# Patient Record
Sex: MALE | Race: WHITE | Employment: OTHER | URBAN - METROPOLITAN AREA
[De-identification: names, ages, dates, MRNs, and addresses within clinical notes are randomized per-mention and may not be internally consistent; named-entity substitution may affect disease eponyms.]

---

## 2017-01-03 ENCOUNTER — HOSPITAL ENCOUNTER (OUTPATIENT)
Dept: PHYSICAL THERAPY | Age: 82
Setting detail: THERAPIES SERIES
Discharge: HOME | End: 2017-01-03
Payer: MEDICARE

## 2017-01-03 PROCEDURE — 97110 THERAPEUTIC EXERCISES: CPT

## 2017-01-03 ASSESSMENT — PAIN DESCRIPTION - ORIENTATION: ORIENTATION: RIGHT

## 2017-01-03 ASSESSMENT — PAIN SCALES - GENERAL: PAINLEVEL_OUTOF10: 4

## 2017-01-03 ASSESSMENT — PAIN DESCRIPTION - DESCRIPTORS: DESCRIPTORS: ACHING

## 2017-01-03 ASSESSMENT — PAIN DESCRIPTION - LOCATION: LOCATION: KNEE;BACK

## 2017-01-06 ENCOUNTER — HOSPITAL ENCOUNTER (OUTPATIENT)
Dept: PHYSICAL THERAPY | Age: 82
Setting detail: THERAPIES SERIES
Discharge: HOME | End: 2017-01-06
Payer: MEDICARE

## 2017-01-06 PROCEDURE — 97110 THERAPEUTIC EXERCISES: CPT

## 2017-01-06 ASSESSMENT — PAIN DESCRIPTION - LOCATION: LOCATION: KNEE;BACK

## 2017-01-06 ASSESSMENT — PAIN SCALES - GENERAL: PAINLEVEL_OUTOF10: 5

## 2017-01-06 ASSESSMENT — PAIN DESCRIPTION - ORIENTATION: ORIENTATION: LOWER;RIGHT

## 2017-01-09 ENCOUNTER — HOSPITAL ENCOUNTER (OUTPATIENT)
Dept: PHYSICAL THERAPY | Age: 82
Setting detail: THERAPIES SERIES
Discharge: HOME | End: 2017-01-09
Payer: MEDICARE

## 2017-01-12 ENCOUNTER — HOSPITAL ENCOUNTER (OUTPATIENT)
Dept: PHYSICAL THERAPY | Age: 82
Setting detail: THERAPIES SERIES
Discharge: HOME | End: 2017-01-12
Payer: MEDICARE

## 2017-01-12 PROCEDURE — 97116 GAIT TRAINING THERAPY: CPT

## 2017-01-12 PROCEDURE — 97110 THERAPEUTIC EXERCISES: CPT

## 2017-01-16 ENCOUNTER — HOSPITAL ENCOUNTER (OUTPATIENT)
Dept: PHYSICAL THERAPY | Age: 82
Setting detail: THERAPIES SERIES
Discharge: HOME | End: 2017-01-16
Payer: MEDICARE

## 2017-01-16 PROCEDURE — 97110 THERAPEUTIC EXERCISES: CPT

## 2017-01-16 PROCEDURE — G8979 MOBILITY GOAL STATUS: HCPCS

## 2017-01-16 PROCEDURE — G8978 MOBILITY CURRENT STATUS: HCPCS

## 2017-01-16 ASSESSMENT — PAIN DESCRIPTION - ORIENTATION: ORIENTATION: RIGHT

## 2017-01-16 ASSESSMENT — PAIN DESCRIPTION - DESCRIPTORS: DESCRIPTORS: ACHING

## 2017-01-16 ASSESSMENT — PAIN DESCRIPTION - LOCATION: LOCATION: KNEE

## 2017-01-16 ASSESSMENT — PAIN SCALES - GENERAL: PAINLEVEL_OUTOF10: 5

## 2017-01-19 ENCOUNTER — HOSPITAL ENCOUNTER (OUTPATIENT)
Dept: PHYSICAL THERAPY | Age: 82
Setting detail: THERAPIES SERIES
Discharge: HOME | End: 2017-01-19
Payer: MEDICARE

## 2017-01-19 PROCEDURE — 97110 THERAPEUTIC EXERCISES: CPT

## 2017-01-23 ENCOUNTER — HOSPITAL ENCOUNTER (OUTPATIENT)
Dept: PHYSICAL THERAPY | Age: 82
Setting detail: THERAPIES SERIES
Discharge: HOME | End: 2017-01-23
Payer: MEDICARE

## 2017-01-23 PROCEDURE — 97110 THERAPEUTIC EXERCISES: CPT

## 2017-01-23 ASSESSMENT — PAIN DESCRIPTION - ORIENTATION: ORIENTATION: RIGHT

## 2017-01-23 ASSESSMENT — PAIN DESCRIPTION - LOCATION: LOCATION: KNEE

## 2017-01-25 ENCOUNTER — HOSPITAL ENCOUNTER (OUTPATIENT)
Dept: PHARMACY | Age: 82
Discharge: HOME | End: 2017-01-25
Payer: MEDICARE

## 2017-01-25 DIAGNOSIS — I48.91 ATRIAL FIBRILLATION, UNSPECIFIED TYPE (HCC): ICD-10-CM

## 2017-01-25 LAB
INR BLD: 3.1
PROTIME: 37.5 SECONDS

## 2017-01-25 PROCEDURE — G0463 HOSPITAL OUTPT CLINIC VISIT: HCPCS

## 2017-01-25 PROCEDURE — 85610 PROTHROMBIN TIME: CPT

## 2017-01-26 ENCOUNTER — HOSPITAL ENCOUNTER (OUTPATIENT)
Dept: PHYSICAL THERAPY | Age: 82
Setting detail: THERAPIES SERIES
Discharge: HOME | End: 2017-01-26
Payer: MEDICARE

## 2017-01-26 PROCEDURE — 97110 THERAPEUTIC EXERCISES: CPT

## 2017-01-30 ENCOUNTER — HOSPITAL ENCOUNTER (OUTPATIENT)
Dept: PHYSICAL THERAPY | Age: 82
Setting detail: THERAPIES SERIES
Discharge: HOME | End: 2017-01-30
Payer: MEDICARE

## 2017-01-30 PROCEDURE — 97110 THERAPEUTIC EXERCISES: CPT

## 2017-01-30 ASSESSMENT — PAIN DESCRIPTION - LOCATION: LOCATION: KNEE

## 2017-01-30 ASSESSMENT — PAIN DESCRIPTION - ORIENTATION: ORIENTATION: RIGHT

## 2017-02-03 ENCOUNTER — HOSPITAL ENCOUNTER (OUTPATIENT)
Dept: PHYSICAL THERAPY | Age: 82
Setting detail: THERAPIES SERIES
Discharge: HOME | End: 2017-02-03
Payer: MEDICARE

## 2017-02-03 PROCEDURE — 97110 THERAPEUTIC EXERCISES: CPT

## 2017-02-03 ASSESSMENT — PAIN DESCRIPTION - DESCRIPTORS: DESCRIPTORS: TIGHTNESS

## 2017-02-03 ASSESSMENT — PAIN DESCRIPTION - LOCATION: LOCATION: KNEE

## 2017-02-03 ASSESSMENT — PAIN SCALES - GENERAL: PAINLEVEL_OUTOF10: 4

## 2017-02-03 ASSESSMENT — PAIN DESCRIPTION - ORIENTATION: ORIENTATION: LEFT

## 2017-02-06 ENCOUNTER — HOSPITAL ENCOUNTER (OUTPATIENT)
Dept: PHYSICAL THERAPY | Age: 82
Setting detail: THERAPIES SERIES
Discharge: HOME | End: 2017-02-06
Payer: MEDICARE

## 2017-02-06 PROCEDURE — 97112 NEUROMUSCULAR REEDUCATION: CPT

## 2017-02-06 PROCEDURE — 97110 THERAPEUTIC EXERCISES: CPT

## 2017-02-06 ASSESSMENT — PAIN DESCRIPTION - LOCATION: LOCATION: KNEE

## 2017-02-06 ASSESSMENT — PAIN SCALES - GENERAL: PAINLEVEL_OUTOF10: 5

## 2017-02-06 ASSESSMENT — PAIN DESCRIPTION - FREQUENCY: FREQUENCY: INTERMITTENT

## 2017-02-06 ASSESSMENT — PAIN DESCRIPTION - ORIENTATION: ORIENTATION: LEFT

## 2017-02-09 ENCOUNTER — HOSPITAL ENCOUNTER (OUTPATIENT)
Dept: PHYSICAL THERAPY | Age: 82
Setting detail: THERAPIES SERIES
Discharge: HOME | End: 2017-02-09
Payer: MEDICARE

## 2017-02-10 ENCOUNTER — APPOINTMENT (OUTPATIENT)
Dept: PHYSICAL THERAPY | Age: 82
End: 2017-02-10
Payer: MEDICARE

## 2017-02-13 ENCOUNTER — HOSPITAL ENCOUNTER (OUTPATIENT)
Dept: PHYSICAL THERAPY | Age: 82
Setting detail: THERAPIES SERIES
Discharge: HOME | End: 2017-02-13
Payer: MEDICARE

## 2017-02-13 PROCEDURE — 97110 THERAPEUTIC EXERCISES: CPT

## 2017-02-15 ENCOUNTER — HOSPITAL ENCOUNTER (OUTPATIENT)
Dept: PHARMACY | Age: 82
Discharge: HOME | End: 2017-02-15
Payer: MEDICARE

## 2017-02-15 DIAGNOSIS — I48.91 ATRIAL FIBRILLATION, UNSPECIFIED TYPE (HCC): ICD-10-CM

## 2017-02-15 LAB
INR BLD: 2.8
PROTIME: 33.5 SECONDS

## 2017-02-15 PROCEDURE — G0463 HOSPITAL OUTPT CLINIC VISIT: HCPCS

## 2017-02-15 PROCEDURE — 85610 PROTHROMBIN TIME: CPT

## 2017-02-16 ENCOUNTER — HOSPITAL ENCOUNTER (OUTPATIENT)
Dept: PHYSICAL THERAPY | Age: 82
Setting detail: THERAPIES SERIES
Discharge: HOME | End: 2017-02-16
Payer: MEDICARE

## 2017-02-16 PROCEDURE — 97110 THERAPEUTIC EXERCISES: CPT

## 2017-02-16 ASSESSMENT — PAIN DESCRIPTION - LOCATION: LOCATION: KNEE

## 2017-02-16 ASSESSMENT — PAIN SCALES - GENERAL: PAINLEVEL_OUTOF10: 5

## 2017-02-16 ASSESSMENT — PAIN DESCRIPTION - DESCRIPTORS: DESCRIPTORS: ACHING

## 2017-02-16 ASSESSMENT — PAIN DESCRIPTION - PAIN TYPE: TYPE: CHRONIC PAIN

## 2017-02-16 ASSESSMENT — PAIN DESCRIPTION - ORIENTATION: ORIENTATION: LEFT;RIGHT

## 2017-02-20 ENCOUNTER — HOSPITAL ENCOUNTER (OUTPATIENT)
Dept: PHYSICAL THERAPY | Age: 82
Setting detail: THERAPIES SERIES
Discharge: HOME | End: 2017-02-20
Payer: MEDICARE

## 2017-02-20 PROCEDURE — 97110 THERAPEUTIC EXERCISES: CPT

## 2017-02-23 ENCOUNTER — HOSPITAL ENCOUNTER (OUTPATIENT)
Dept: PHYSICAL THERAPY | Age: 82
Setting detail: THERAPIES SERIES
Discharge: HOME | End: 2017-02-23
Payer: MEDICARE

## 2017-03-01 ENCOUNTER — HOSPITAL ENCOUNTER (OUTPATIENT)
Dept: PHYSICAL THERAPY | Age: 82
Setting detail: THERAPIES SERIES
Discharge: HOME | End: 2017-03-01
Payer: MEDICARE

## 2017-03-01 PROCEDURE — 97110 THERAPEUTIC EXERCISES: CPT

## 2017-03-01 PROCEDURE — G8979 MOBILITY GOAL STATUS: HCPCS

## 2017-03-01 PROCEDURE — G8980 MOBILITY D/C STATUS: HCPCS

## 2017-03-15 ENCOUNTER — HOSPITAL ENCOUNTER (OUTPATIENT)
Dept: PHARMACY | Age: 82
Discharge: HOME | End: 2017-03-15
Payer: MEDICARE

## 2017-03-15 DIAGNOSIS — I48.91 ATRIAL FIBRILLATION, UNSPECIFIED TYPE (HCC): ICD-10-CM

## 2017-03-15 LAB
INR BLD: 2.3
PROTIME: 27.8 SECONDS

## 2017-03-15 PROCEDURE — G0463 HOSPITAL OUTPT CLINIC VISIT: HCPCS

## 2017-03-15 PROCEDURE — 85610 PROTHROMBIN TIME: CPT

## 2017-04-06 ENCOUNTER — HOSPITAL ENCOUNTER (OUTPATIENT)
Dept: ORTHOPEDIC SURGERY | Age: 82
Discharge: HOME | End: 2017-04-06
Payer: MEDICARE

## 2017-04-06 DIAGNOSIS — M17.12 LOCALIZED PRIMARY OSTEOARTHRITIS OF LOWER LEG, LEFT: ICD-10-CM

## 2017-04-06 PROCEDURE — 73560 X-RAY EXAM OF KNEE 1 OR 2: CPT

## 2017-04-12 ENCOUNTER — HOSPITAL ENCOUNTER (OUTPATIENT)
Dept: PHARMACY | Age: 82
Discharge: HOME | End: 2017-04-12
Payer: MEDICARE

## 2017-04-12 DIAGNOSIS — I48.91 ATRIAL FIBRILLATION, UNSPECIFIED TYPE (HCC): ICD-10-CM

## 2017-04-12 LAB
INR BLD: 2.1
PROTIME: 25.7 SECONDS

## 2017-04-12 PROCEDURE — 85610 PROTHROMBIN TIME: CPT

## 2017-04-12 PROCEDURE — G0463 HOSPITAL OUTPT CLINIC VISIT: HCPCS

## 2017-05-05 ENCOUNTER — OFFICE VISIT (OUTPATIENT)
Dept: FAMILY MEDICINE CLINIC | Age: 82
End: 2017-05-05

## 2017-05-05 VITALS
OXYGEN SATURATION: 96 % | HEART RATE: 67 BPM | DIASTOLIC BLOOD PRESSURE: 68 MMHG | HEIGHT: 66 IN | SYSTOLIC BLOOD PRESSURE: 118 MMHG | RESPIRATION RATE: 18 BRPM | TEMPERATURE: 97.8 F | WEIGHT: 180.7 LBS | BODY MASS INDEX: 29.04 KG/M2

## 2017-05-05 DIAGNOSIS — I48.91 ATRIAL FIBRILLATION, UNSPECIFIED TYPE (HCC): ICD-10-CM

## 2017-05-05 DIAGNOSIS — N40.1 BENIGN NON-NODULAR PROSTATIC HYPERPLASIA WITH LOWER URINARY TRACT SYMPTOMS: Primary | ICD-10-CM

## 2017-05-05 DIAGNOSIS — I48.21 PERMANENT ATRIAL FIBRILLATION (HCC): ICD-10-CM

## 2017-05-05 PROCEDURE — 4040F PNEUMOC VAC/ADMIN/RCVD: CPT | Performed by: FAMILY MEDICINE

## 2017-05-05 PROCEDURE — 99214 OFFICE O/P EST MOD 30 MIN: CPT | Performed by: FAMILY MEDICINE

## 2017-05-05 PROCEDURE — 1036F TOBACCO NON-USER: CPT | Performed by: FAMILY MEDICINE

## 2017-05-05 PROCEDURE — G8427 DOCREV CUR MEDS BY ELIG CLIN: HCPCS | Performed by: FAMILY MEDICINE

## 2017-05-05 PROCEDURE — G8420 CALC BMI NORM PARAMETERS: HCPCS | Performed by: FAMILY MEDICINE

## 2017-05-05 PROCEDURE — 1123F ACP DISCUSS/DSCN MKR DOCD: CPT | Performed by: FAMILY MEDICINE

## 2017-05-05 RX ORDER — TRIAMTERENE AND HYDROCHLOROTHIAZIDE 37.5; 25 MG/1; MG/1
1 TABLET ORAL DAILY
COMMUNITY
End: 2018-05-17 | Stop reason: ALTCHOICE

## 2017-05-05 ASSESSMENT — ENCOUNTER SYMPTOMS
GASTROINTESTINAL NEGATIVE: 1
EYES NEGATIVE: 1
SHORTNESS OF BREATH: 0
ALLERGIC/IMMUNOLOGIC NEGATIVE: 1
RESPIRATORY NEGATIVE: 1

## 2017-05-11 ENCOUNTER — HOSPITAL ENCOUNTER (OUTPATIENT)
Dept: PHARMACY | Age: 82
Discharge: HOME | End: 2017-05-11
Payer: MEDICARE

## 2017-05-11 DIAGNOSIS — I48.91 ATRIAL FIBRILLATION, UNSPECIFIED TYPE (HCC): ICD-10-CM

## 2017-05-11 LAB
INR BLD: 2.4
PROTIME: 28.7 SECONDS

## 2017-05-11 PROCEDURE — 85610 PROTHROMBIN TIME: CPT | Performed by: PHARMACIST

## 2017-05-11 PROCEDURE — 99211 OFF/OP EST MAY X REQ PHY/QHP: CPT | Performed by: PHARMACIST

## 2017-06-08 ENCOUNTER — HOSPITAL ENCOUNTER (OUTPATIENT)
Dept: PHARMACY | Age: 82
Discharge: HOME | End: 2017-06-08
Payer: MEDICARE

## 2017-06-08 DIAGNOSIS — I48.91 ATRIAL FIBRILLATION, UNSPECIFIED TYPE (HCC): ICD-10-CM

## 2017-06-08 LAB
INR BLD: 2.7
PROTIME: 32.1 SECONDS

## 2017-06-08 PROCEDURE — 99211 OFF/OP EST MAY X REQ PHY/QHP: CPT | Performed by: PHARMACIST

## 2017-06-08 PROCEDURE — 85610 PROTHROMBIN TIME: CPT | Performed by: PHARMACIST

## 2017-07-06 ENCOUNTER — HOSPITAL ENCOUNTER (OUTPATIENT)
Dept: PHARMACY | Age: 82
Setting detail: THERAPIES SERIES
Discharge: HOME | End: 2017-07-06
Payer: MEDICARE

## 2017-07-06 DIAGNOSIS — I48.91 ATRIAL FIBRILLATION, UNSPECIFIED TYPE (HCC): ICD-10-CM

## 2017-07-06 LAB
INR BLD: 2.8
PROTIME: 33.2 SECONDS

## 2017-07-06 PROCEDURE — 99211 OFF/OP EST MAY X REQ PHY/QHP: CPT | Performed by: PHARMACIST

## 2017-07-06 PROCEDURE — 85610 PROTHROMBIN TIME: CPT | Performed by: PHARMACIST

## 2017-07-25 RX ORDER — WARFARIN SODIUM 5 MG/1
TABLET ORAL
Qty: 140 TABLET | Refills: 1 | Status: SHIPPED | OUTPATIENT
Start: 2017-07-25 | End: 2017-07-26 | Stop reason: SDUPTHER

## 2017-07-26 RX ORDER — WARFARIN SODIUM 5 MG/1
TABLET ORAL
Qty: 140 TABLET | Refills: 1 | Status: SHIPPED | OUTPATIENT
Start: 2017-07-26 | End: 2018-01-18 | Stop reason: SDUPTHER

## 2017-08-10 ENCOUNTER — HOSPITAL ENCOUNTER (OUTPATIENT)
Dept: PHARMACY | Age: 82
Setting detail: THERAPIES SERIES
Discharge: HOME | End: 2017-08-10
Payer: MEDICARE

## 2017-08-10 DIAGNOSIS — I48.91 ATRIAL FIBRILLATION, UNSPECIFIED TYPE (HCC): ICD-10-CM

## 2017-08-10 LAB
INR BLD: 2.9
PROTIME: 34.7 SECONDS

## 2017-08-10 PROCEDURE — 99211 OFF/OP EST MAY X REQ PHY/QHP: CPT | Performed by: PHARMACIST

## 2017-08-10 PROCEDURE — 85610 PROTHROMBIN TIME: CPT | Performed by: PHARMACIST

## 2017-09-14 ENCOUNTER — HOSPITAL ENCOUNTER (OUTPATIENT)
Dept: PHARMACY | Age: 82
Setting detail: THERAPIES SERIES
Discharge: HOME | End: 2017-09-14
Payer: MEDICARE

## 2017-09-14 DIAGNOSIS — I48.91 ATRIAL FIBRILLATION, UNSPECIFIED TYPE (HCC): ICD-10-CM

## 2017-09-14 LAB
INR BLD: 2
PROTIME: 23.4 SECONDS

## 2017-09-14 PROCEDURE — 99211 OFF/OP EST MAY X REQ PHY/QHP: CPT | Performed by: PHARMACIST

## 2017-09-14 PROCEDURE — 85610 PROTHROMBIN TIME: CPT | Performed by: PHARMACIST

## 2017-10-16 RX ORDER — TAMSULOSIN HYDROCHLORIDE 0.4 MG/1
0.8 CAPSULE ORAL DAILY
Qty: 180 CAPSULE | Refills: 0 | Status: SHIPPED | OUTPATIENT
Start: 2017-10-16 | End: 2017-11-13 | Stop reason: SDUPTHER

## 2017-10-26 ENCOUNTER — HOSPITAL ENCOUNTER (OUTPATIENT)
Dept: PHARMACY | Age: 82
Setting detail: THERAPIES SERIES
Discharge: HOME | End: 2017-10-26
Payer: MEDICARE

## 2017-10-26 DIAGNOSIS — I48.91 ATRIAL FIBRILLATION, UNSPECIFIED TYPE (HCC): ICD-10-CM

## 2017-10-26 LAB
INR BLD: 3
PROTIME: 36.4 SECONDS

## 2017-10-26 PROCEDURE — 99211 OFF/OP EST MAY X REQ PHY/QHP: CPT | Performed by: PHARMACIST

## 2017-10-26 PROCEDURE — 85610 PROTHROMBIN TIME: CPT | Performed by: PHARMACIST

## 2017-10-26 NOTE — PROGRESS NOTES
Mr. Gary is a 80 y.o. y/o male with history of Afib who presents today for anticoagulation monitoring and adjustment.   INR 3.0 is therapeutic for this patient (goal range 2-3) and is reflective of 47.5 mg TWD  Patient verifies current dosing regimen, patient able to verbally recall dose  Patient reports no missed doses since last INR   Patient denies s/sx clotting and/or stroke  Patient denies hematuria, epistaxis, rectal bleeding  Patient denies changes in diet, alcohol, or tobacco use  Reviewed medication list and drug allergies with patient, updated any medication additions or modifications accordingly  Patient also denies any pending medical or dental procedures scheduled at this time  Patient was instructed to continue 47.5 mg TWD and RTC 6 weeks

## 2017-11-10 ENCOUNTER — HOSPITAL ENCOUNTER (OUTPATIENT)
Dept: ORTHOPEDIC SURGERY | Age: 82
Discharge: HOME | End: 2017-11-10
Payer: MEDICARE

## 2017-11-10 DIAGNOSIS — M17.12 PRIMARY LOCALIZED OSTEOARTHROSIS OF LEFT LOWER LEG: ICD-10-CM

## 2017-11-10 PROCEDURE — 73560 X-RAY EXAM OF KNEE 1 OR 2: CPT

## 2017-11-13 ENCOUNTER — OFFICE VISIT (OUTPATIENT)
Dept: FAMILY MEDICINE CLINIC | Age: 82
End: 2017-11-13

## 2017-11-13 VITALS
RESPIRATION RATE: 16 BRPM | BODY MASS INDEX: 29.57 KG/M2 | OXYGEN SATURATION: 97 % | HEART RATE: 74 BPM | TEMPERATURE: 98.1 F | DIASTOLIC BLOOD PRESSURE: 80 MMHG | WEIGHT: 184 LBS | SYSTOLIC BLOOD PRESSURE: 128 MMHG | HEIGHT: 66 IN

## 2017-11-13 DIAGNOSIS — I48.91 ATRIAL FIBRILLATION, UNSPECIFIED TYPE (HCC): ICD-10-CM

## 2017-11-13 DIAGNOSIS — I10 ESSENTIAL HYPERTENSION: Primary | ICD-10-CM

## 2017-11-13 DIAGNOSIS — N40.1 BENIGN PROSTATIC HYPERPLASIA WITH LOWER URINARY TRACT SYMPTOMS, SYMPTOM DETAILS UNSPECIFIED: ICD-10-CM

## 2017-11-13 PROCEDURE — 1123F ACP DISCUSS/DSCN MKR DOCD: CPT | Performed by: FAMILY MEDICINE

## 2017-11-13 PROCEDURE — G8427 DOCREV CUR MEDS BY ELIG CLIN: HCPCS | Performed by: FAMILY MEDICINE

## 2017-11-13 PROCEDURE — G8419 CALC BMI OUT NRM PARAM NOF/U: HCPCS | Performed by: FAMILY MEDICINE

## 2017-11-13 PROCEDURE — 4040F PNEUMOC VAC/ADMIN/RCVD: CPT | Performed by: FAMILY MEDICINE

## 2017-11-13 PROCEDURE — G8484 FLU IMMUNIZE NO ADMIN: HCPCS | Performed by: FAMILY MEDICINE

## 2017-11-13 PROCEDURE — 99214 OFFICE O/P EST MOD 30 MIN: CPT | Performed by: FAMILY MEDICINE

## 2017-11-13 PROCEDURE — 1036F TOBACCO NON-USER: CPT | Performed by: FAMILY MEDICINE

## 2017-11-13 RX ORDER — TAMSULOSIN HYDROCHLORIDE 0.4 MG/1
0.8 CAPSULE ORAL DAILY
Qty: 180 CAPSULE | Refills: 3 | Status: SHIPPED | OUTPATIENT
Start: 2017-11-13 | End: 2018-10-08 | Stop reason: ALTCHOICE

## 2017-11-13 ASSESSMENT — ENCOUNTER SYMPTOMS
SHORTNESS OF BREATH: 0
ALLERGIC/IMMUNOLOGIC NEGATIVE: 1
GASTROINTESTINAL NEGATIVE: 1
EYES NEGATIVE: 1
RESPIRATORY NEGATIVE: 1

## 2017-11-13 NOTE — PROGRESS NOTES
breakfast) 90 tablet 3    finasteride (PROSCAR) 5 MG tablet TAKE 1 TABLET BY MOUTH EVERY DAY 90 tablet 1    celecoxib (CELEBREX) 200 MG capsule Take 1 capsule by mouth daily 30 capsule 3    brimonidine-timolol (COMBIGAN) 0.2-0.5 % ophthalmic solution Place 1 drop into both eyes every 12 hours.  SHARK CARTILAGE by Does not apply route.  Glucosamine-Chondroit-Vit C-Mn (GLUCOSAMINE 1500 COMPLEX) CAPS Take  by mouth daily.  travoprost, benzalkonium, (TRAVATAN) 0.004 % ophthalmic solution 1 drop nightly.  vitamin E 400 UNIT capsule Take 400 Units by mouth daily.  magnesium oxide (MAG-OX) 400 MG tablet Take 400 mg by mouth daily.  sotalol (BETAPACE) 80 MG tablet Take 80 mg by mouth 2 times daily. 1/2 BID        No current facility-administered medications for this visit. Current Outpatient Prescriptions on File Prior to Visit   Medication Sig Dispense Refill    warfarin (COUMADIN) 5 MG tablet Take as directed by [facility] [place] [facility]. Quantity equals 90 day supply. 140 tablet 1    triamterene-hydrochlorothiazide (MAXZIDE-25) 37.5-25 MG per tablet Take 1 tablet by mouth daily      ferrous sulfate (FEOSOL) 325 (65 FE) MG tablet Take 1 tablet by mouth daily (with breakfast) 90 tablet 3    finasteride (PROSCAR) 5 MG tablet TAKE 1 TABLET BY MOUTH EVERY DAY 90 tablet 1    celecoxib (CELEBREX) 200 MG capsule Take 1 capsule by mouth daily 30 capsule 3    brimonidine-timolol (COMBIGAN) 0.2-0.5 % ophthalmic solution Place 1 drop into both eyes every 12 hours.  SHARK CARTILAGE by Does not apply route.  Glucosamine-Chondroit-Vit C-Mn (GLUCOSAMINE 1500 COMPLEX) CAPS Take  by mouth daily.  travoprost, benzalkonium, (TRAVATAN) 0.004 % ophthalmic solution 1 drop nightly.  vitamin E 400 UNIT capsule Take 400 Units by mouth daily.  magnesium oxide (MAG-OX) 400 MG tablet Take 400 mg by mouth daily.         sotalol (BETAPACE) 80 MG tablet Take 80 mg by mouth 2 times daily. 1/2 BID        No current facility-administered medications on file prior to visit. Allergies   Allergen Reactions    Sulfa Antibiotics      Health Maintenance   Topic Date Due    DTaP/Tdap/Td vaccine (1 - Tdap) 03/07/1950    Zostavax vaccine  03/07/1991    Pneumococcal low/med risk (1 of 2 - PCV13) 03/07/1996    Flu vaccine  Completed       Review of Systems    Review of Systems   Constitutional: Negative for activity change, appetite change, chills, fever and unexpected weight change. HENT: Negative. Eyes: Negative. Respiratory: Negative. Negative for shortness of breath. Cardiovascular: Negative. Negative for chest pain and palpitations. Gastrointestinal: Negative. Endocrine: Negative. Genitourinary: Negative. Musculoskeletal: Negative. Skin: Negative. Allergic/Immunologic: Negative. Neurological: Negative. Hematological: Negative. Psychiatric/Behavioral: Negative. Physical Exam  Vitals:    11/13/17 1333   BP: 128/80   Pulse: 74   Resp: 16   Temp: 98.1 °F (36.7 °C)   TempSrc: Tympanic   SpO2: 97%   Weight: 184 lb (83.5 kg)   Height: 5' 6\" (1.676 m)       Physical Exam   Constitutional: He appears well-developed and well-nourished. HENT:   Right Ear: External ear normal.   Left Ear: External ear normal.   Eyes: Conjunctivae are normal. Pupils are equal, round, and reactive to light. Neck: Normal range of motion. Neck supple. No thyromegaly present. Cardiovascular: Normal rate, regular rhythm and normal heart sounds. No murmur heard. Pulmonary/Chest: Effort normal and breath sounds normal.   Abdominal: Soft. Bowel sounds are normal.   Musculoskeletal: Normal range of motion. He exhibits no edema or tenderness. Lymphadenopathy:     He has no cervical adenopathy. Assessment  1. Essential hypertension     2.  Benign prostatic hyperplasia with lower urinary tract symptoms, symptom details unspecified

## 2017-12-07 ENCOUNTER — HOSPITAL ENCOUNTER (OUTPATIENT)
Dept: PHARMACY | Age: 82
Setting detail: THERAPIES SERIES
Discharge: HOME | End: 2017-12-07
Payer: MEDICARE

## 2017-12-07 DIAGNOSIS — I48.91 ATRIAL FIBRILLATION, UNSPECIFIED TYPE (HCC): ICD-10-CM

## 2017-12-07 LAB
INR BLD: 2.2
PROTIME: 26.3 SECONDS

## 2017-12-07 PROCEDURE — 99211 OFF/OP EST MAY X REQ PHY/QHP: CPT | Performed by: PHARMACIST

## 2017-12-07 PROCEDURE — 85610 PROTHROMBIN TIME: CPT | Performed by: PHARMACIST

## 2017-12-07 NOTE — PROGRESS NOTES
Mr. Abdirizak Young is a 80 y.o. y/o male with history of Afib who presents today for anticoagulation monitoring and adjustment.   INR 2.2 is therapeutic for this patient (goal range 2-3) and is reflective of 47.5 mg TWD  Patient verifies current dosing regimen, patient able to verbally recall dose  Patient reports no  missed doses since last INR   Patient denies s/sx clotting and/or stroke  Patient denies hematuria, epistaxis, rectal bleeding  Patient denies changes in diet, alcohol, or tobacco use  Reviewed medication list and drug allergies with patient, updated any medication additions or modifications accordingly  Patient also denies any pending medical or dental procedures scheduled at this time  Patient was instructed to continue 47.5mg TWD and RTC 6 weeks

## 2018-01-18 ENCOUNTER — HOSPITAL ENCOUNTER (OUTPATIENT)
Dept: PHARMACY | Age: 83
Setting detail: THERAPIES SERIES
Discharge: HOME | End: 2018-01-18
Payer: MEDICARE

## 2018-01-18 DIAGNOSIS — I48.91 ATRIAL FIBRILLATION, UNSPECIFIED TYPE (HCC): ICD-10-CM

## 2018-01-18 LAB
INR BLD: 2.2
PROTIME: 26.1 SECONDS

## 2018-01-18 PROCEDURE — 99211 OFF/OP EST MAY X REQ PHY/QHP: CPT | Performed by: PHARMACIST

## 2018-01-18 PROCEDURE — 85610 PROTHROMBIN TIME: CPT | Performed by: PHARMACIST

## 2018-01-18 RX ORDER — WARFARIN SODIUM 5 MG/1
TABLET ORAL
Qty: 140 TABLET | Refills: 1 | Status: ON HOLD | OUTPATIENT
Start: 2018-01-18 | End: 2018-06-20 | Stop reason: HOSPADM

## 2018-02-22 ENCOUNTER — HOSPITAL ENCOUNTER (OUTPATIENT)
Dept: PHARMACY | Age: 83
Setting detail: THERAPIES SERIES
Discharge: HOME | End: 2018-02-22
Payer: MEDICARE

## 2018-02-22 DIAGNOSIS — I48.91 ATRIAL FIBRILLATION, UNSPECIFIED TYPE (HCC): ICD-10-CM

## 2018-02-22 LAB
INR BLD: 2.4
PROTIME: 28.2 SECONDS

## 2018-02-22 PROCEDURE — 85610 PROTHROMBIN TIME: CPT | Performed by: PHARMACIST

## 2018-02-22 PROCEDURE — 99211 OFF/OP EST MAY X REQ PHY/QHP: CPT | Performed by: PHARMACIST

## 2018-04-05 ENCOUNTER — HOSPITAL ENCOUNTER (OUTPATIENT)
Dept: PHARMACY | Age: 83
Setting detail: THERAPIES SERIES
Discharge: HOME | End: 2018-04-05
Payer: MEDICARE

## 2018-04-05 DIAGNOSIS — I48.91 ATRIAL FIBRILLATION, UNSPECIFIED TYPE (HCC): ICD-10-CM

## 2018-04-05 LAB
INR BLD: 2.6
PROTIME: 31 SECONDS

## 2018-04-05 PROCEDURE — 85610 PROTHROMBIN TIME: CPT

## 2018-04-05 PROCEDURE — 99211 OFF/OP EST MAY X REQ PHY/QHP: CPT

## 2018-05-17 ENCOUNTER — OFFICE VISIT (OUTPATIENT)
Dept: INTERNAL MEDICINE CLINIC | Age: 83
End: 2018-05-17
Payer: MEDICARE

## 2018-05-17 ENCOUNTER — HOSPITAL ENCOUNTER (OUTPATIENT)
Dept: PHARMACY | Age: 83
Setting detail: THERAPIES SERIES
Discharge: HOME | End: 2018-05-17
Payer: MEDICARE

## 2018-05-17 VITALS
WEIGHT: 181 LBS | HEART RATE: 82 BPM | HEIGHT: 66 IN | BODY MASS INDEX: 29.09 KG/M2 | TEMPERATURE: 97.9 F | SYSTOLIC BLOOD PRESSURE: 126 MMHG | RESPIRATION RATE: 18 BRPM | DIASTOLIC BLOOD PRESSURE: 64 MMHG | OXYGEN SATURATION: 94 %

## 2018-05-17 DIAGNOSIS — Z23 NEED FOR PNEUMOCOCCAL VACCINATION: ICD-10-CM

## 2018-05-17 DIAGNOSIS — I48.91 ATRIAL FIBRILLATION, UNSPECIFIED TYPE (HCC): Primary | ICD-10-CM

## 2018-05-17 DIAGNOSIS — M17.10 KNEE ARTHROPATHY: ICD-10-CM

## 2018-05-17 DIAGNOSIS — N40.1 BENIGN PROSTATIC HYPERPLASIA WITH LOWER URINARY TRACT SYMPTOMS, SYMPTOM DETAILS UNSPECIFIED: ICD-10-CM

## 2018-05-17 DIAGNOSIS — M54.50 CHRONIC BILATERAL LOW BACK PAIN WITHOUT SCIATICA: ICD-10-CM

## 2018-05-17 DIAGNOSIS — G89.29 CHRONIC BILATERAL LOW BACK PAIN WITHOUT SCIATICA: ICD-10-CM

## 2018-05-17 DIAGNOSIS — I48.91 ATRIAL FIBRILLATION, UNSPECIFIED TYPE (HCC): ICD-10-CM

## 2018-05-17 LAB
ALBUMIN SERPL-MCNC: 3.9 G/DL (ref 3.9–4.9)
ALP BLD-CCNC: 76 U/L (ref 35–104)
ALT SERPL-CCNC: 11 U/L (ref 0–41)
ANION GAP SERPL CALCULATED.3IONS-SCNC: 13 MEQ/L (ref 7–13)
AST SERPL-CCNC: 20 U/L (ref 0–40)
BILIRUB SERPL-MCNC: 0.6 MG/DL (ref 0–1.2)
BUN BLDV-MCNC: 26 MG/DL (ref 8–23)
CALCIUM SERPL-MCNC: 8.8 MG/DL (ref 8.6–10.2)
CHLORIDE BLD-SCNC: 107 MEQ/L (ref 98–107)
CO2: 25 MEQ/L (ref 22–29)
CREAT SERPL-MCNC: 1.01 MG/DL (ref 0.7–1.2)
GFR AFRICAN AMERICAN: >60
GFR NON-AFRICAN AMERICAN: >60
GLOBULIN: 2.4 G/DL (ref 2.3–3.5)
GLUCOSE BLD-MCNC: 85 MG/DL (ref 74–109)
INR BLD: 4.3
INR BLD: 5
POTASSIUM SERPL-SCNC: 5 MEQ/L (ref 3.5–5.1)
PROTIME: 51.3 SECONDS
PROTIME: 60.5 SECONDS
SODIUM BLD-SCNC: 145 MEQ/L (ref 132–144)
TOTAL PROTEIN: 6.3 G/DL (ref 6.4–8.1)

## 2018-05-17 PROCEDURE — G8427 DOCREV CUR MEDS BY ELIG CLIN: HCPCS | Performed by: FAMILY MEDICINE

## 2018-05-17 PROCEDURE — 1036F TOBACCO NON-USER: CPT | Performed by: FAMILY MEDICINE

## 2018-05-17 PROCEDURE — 4040F PNEUMOC VAC/ADMIN/RCVD: CPT | Performed by: FAMILY MEDICINE

## 2018-05-17 PROCEDURE — G8419 CALC BMI OUT NRM PARAM NOF/U: HCPCS | Performed by: FAMILY MEDICINE

## 2018-05-17 PROCEDURE — 85610 PROTHROMBIN TIME: CPT

## 2018-05-17 PROCEDURE — G0009 ADMIN PNEUMOCOCCAL VACCINE: HCPCS | Performed by: FAMILY MEDICINE

## 2018-05-17 PROCEDURE — 1123F ACP DISCUSS/DSCN MKR DOCD: CPT | Performed by: FAMILY MEDICINE

## 2018-05-17 PROCEDURE — 99214 OFFICE O/P EST MOD 30 MIN: CPT | Performed by: FAMILY MEDICINE

## 2018-05-17 PROCEDURE — 99211 OFF/OP EST MAY X REQ PHY/QHP: CPT

## 2018-05-17 PROCEDURE — 90670 PCV13 VACCINE IM: CPT | Performed by: FAMILY MEDICINE

## 2018-05-17 ASSESSMENT — ENCOUNTER SYMPTOMS
ALLERGIC/IMMUNOLOGIC NEGATIVE: 1
GASTROINTESTINAL NEGATIVE: 1
RESPIRATORY NEGATIVE: 1
EYES NEGATIVE: 1
SHORTNESS OF BREATH: 0

## 2018-05-17 ASSESSMENT — PATIENT HEALTH QUESTIONNAIRE - PHQ9
1. LITTLE INTEREST OR PLEASURE IN DOING THINGS: 0
SUM OF ALL RESPONSES TO PHQ9 QUESTIONS 1 & 2: 0
SUM OF ALL RESPONSES TO PHQ QUESTIONS 1-9: 0
2. FEELING DOWN, DEPRESSED OR HOPELESS: 0

## 2018-05-22 ENCOUNTER — OFFICE VISIT (OUTPATIENT)
Dept: INTERNAL MEDICINE CLINIC | Age: 83
End: 2018-05-22
Payer: MEDICARE

## 2018-05-22 VITALS
DIASTOLIC BLOOD PRESSURE: 72 MMHG | RESPIRATION RATE: 16 BRPM | OXYGEN SATURATION: 98 % | TEMPERATURE: 97.6 F | WEIGHT: 186.2 LBS | SYSTOLIC BLOOD PRESSURE: 110 MMHG | HEART RATE: 62 BPM | HEIGHT: 66 IN | BODY MASS INDEX: 29.92 KG/M2

## 2018-05-22 DIAGNOSIS — J40 BRONCHITIS: Primary | ICD-10-CM

## 2018-05-22 PROCEDURE — 99213 OFFICE O/P EST LOW 20 MIN: CPT | Performed by: NURSE PRACTITIONER

## 2018-05-22 PROCEDURE — 1123F ACP DISCUSS/DSCN MKR DOCD: CPT | Performed by: NURSE PRACTITIONER

## 2018-05-22 PROCEDURE — G8427 DOCREV CUR MEDS BY ELIG CLIN: HCPCS | Performed by: NURSE PRACTITIONER

## 2018-05-22 PROCEDURE — 4040F PNEUMOC VAC/ADMIN/RCVD: CPT | Performed by: NURSE PRACTITIONER

## 2018-05-22 PROCEDURE — G8417 CALC BMI ABV UP PARAM F/U: HCPCS | Performed by: NURSE PRACTITIONER

## 2018-05-22 PROCEDURE — 1036F TOBACCO NON-USER: CPT | Performed by: NURSE PRACTITIONER

## 2018-05-22 RX ORDER — PREDNISONE 10 MG/1
TABLET ORAL
Qty: 40 TABLET | Refills: 0 | Status: ON HOLD | OUTPATIENT
Start: 2018-05-22 | End: 2018-06-20 | Stop reason: HOSPADM

## 2018-05-22 RX ORDER — CEFDINIR 300 MG/1
300 CAPSULE ORAL 2 TIMES DAILY
Qty: 20 CAPSULE | Refills: 0 | Status: SHIPPED | OUTPATIENT
Start: 2018-05-22 | End: 2020-12-24 | Stop reason: SDUPTHER

## 2018-05-22 ASSESSMENT — ENCOUNTER SYMPTOMS
NAUSEA: 0
DIARRHEA: 0
ABDOMINAL PAIN: 0
RHINORRHEA: 0
TROUBLE SWALLOWING: 0
SORE THROAT: 0
COUGH: 1
SHORTNESS OF BREATH: 1
WHEEZING: 0
VOMITING: 0
SWOLLEN GLANDS: 0

## 2018-06-04 ENCOUNTER — HOSPITAL ENCOUNTER (OUTPATIENT)
Dept: PHYSICAL THERAPY | Age: 83
Setting detail: THERAPIES SERIES
Discharge: HOME | End: 2018-06-04
Payer: MEDICARE

## 2018-06-06 ENCOUNTER — TELEPHONE (OUTPATIENT)
Dept: PHARMACY | Age: 83
End: 2018-06-06

## 2018-06-06 DIAGNOSIS — I48.91 ATRIAL FIBRILLATION, UNSPECIFIED TYPE (HCC): ICD-10-CM

## 2018-06-11 ENCOUNTER — APPOINTMENT (OUTPATIENT)
Dept: GENERAL RADIOLOGY | Age: 83
DRG: 283 | End: 2018-06-11
Payer: MEDICARE

## 2018-06-11 ENCOUNTER — HOSPITAL ENCOUNTER (INPATIENT)
Age: 83
LOS: 9 days | Discharge: NURSING, REHAB OR LONG-TERM CARE | DRG: 283 | End: 2018-06-20
Attending: EMERGENCY MEDICINE | Admitting: INTERNAL MEDICINE
Payer: MEDICARE

## 2018-06-11 DIAGNOSIS — J18.9 PNEUMONIA DUE TO ORGANISM: Primary | ICD-10-CM

## 2018-06-11 DIAGNOSIS — I48.91 ATRIAL FIBRILLATION WITH RAPID VENTRICULAR RESPONSE (HCC): ICD-10-CM

## 2018-06-11 DIAGNOSIS — R06.00 DYSPNEA, UNSPECIFIED TYPE: ICD-10-CM

## 2018-06-11 LAB
ALBUMIN SERPL-MCNC: 3.6 G/DL (ref 3.9–4.9)
ALP BLD-CCNC: 77 U/L (ref 35–104)
ALT SERPL-CCNC: 33 U/L (ref 0–41)
ANION GAP SERPL CALCULATED.3IONS-SCNC: 15 MEQ/L (ref 7–13)
AST SERPL-CCNC: 19 U/L (ref 0–40)
BASOPHILS ABSOLUTE: 0 K/UL (ref 0–0.2)
BASOPHILS RELATIVE PERCENT: 0.5 %
BILIRUB SERPL-MCNC: 0.9 MG/DL (ref 0–1.2)
BUN BLDV-MCNC: 21 MG/DL (ref 8–23)
CALCIUM SERPL-MCNC: 9.1 MG/DL (ref 8.6–10.2)
CHLORIDE BLD-SCNC: 101 MEQ/L (ref 98–107)
CO2: 22 MEQ/L (ref 22–29)
CREAT SERPL-MCNC: 0.97 MG/DL (ref 0.7–1.2)
EOSINOPHILS ABSOLUTE: 0.1 K/UL (ref 0–0.7)
EOSINOPHILS RELATIVE PERCENT: 1.8 %
GFR AFRICAN AMERICAN: >60
GFR NON-AFRICAN AMERICAN: >60
GLOBULIN: 3 G/DL (ref 2.3–3.5)
GLUCOSE BLD-MCNC: 105 MG/DL (ref 74–109)
HCT VFR BLD CALC: 42.9 % (ref 42–52)
HEMOGLOBIN: 14 G/DL (ref 14–18)
LACTIC ACID: 1.2 MMOL/L (ref 0.5–2.2)
LYMPHOCYTES ABSOLUTE: 0.6 K/UL (ref 1–4.8)
LYMPHOCYTES RELATIVE PERCENT: 7.5 %
MCH RBC QN AUTO: 29.7 PG (ref 27–31.3)
MCHC RBC AUTO-ENTMCNC: 32.6 % (ref 33–37)
MCV RBC AUTO: 91.1 FL (ref 80–100)
MONOCYTES ABSOLUTE: 0.5 K/UL (ref 0.2–0.8)
MONOCYTES RELATIVE PERCENT: 6.6 %
NEUTROPHILS ABSOLUTE: 6.6 K/UL (ref 1.4–6.5)
NEUTROPHILS RELATIVE PERCENT: 83.6 %
PDW BLD-RTO: 15.1 % (ref 11.5–14.5)
PLATELET # BLD: 154 K/UL (ref 130–400)
POTASSIUM SERPL-SCNC: 4.5 MEQ/L (ref 3.5–5.1)
PRO-BNP: 5192 PG/ML
RBC # BLD: 4.71 M/UL (ref 4.7–6.1)
SODIUM BLD-SCNC: 138 MEQ/L (ref 132–144)
TOTAL PROTEIN: 6.6 G/DL (ref 6.4–8.1)
TROPONIN: 0.04 NG/ML (ref 0–0.01)
WBC # BLD: 7.9 K/UL (ref 4.8–10.8)

## 2018-06-11 PROCEDURE — 85610 PROTHROMBIN TIME: CPT

## 2018-06-11 PROCEDURE — 96374 THER/PROPH/DIAG INJ IV PUSH: CPT

## 2018-06-11 PROCEDURE — 80053 COMPREHEN METABOLIC PANEL: CPT

## 2018-06-11 PROCEDURE — 99285 EMERGENCY DEPT VISIT HI MDM: CPT

## 2018-06-11 PROCEDURE — 85025 COMPLETE CBC W/AUTO DIFF WBC: CPT

## 2018-06-11 PROCEDURE — 36415 COLL VENOUS BLD VENIPUNCTURE: CPT

## 2018-06-11 PROCEDURE — 83880 ASSAY OF NATRIURETIC PEPTIDE: CPT

## 2018-06-11 PROCEDURE — 87040 BLOOD CULTURE FOR BACTERIA: CPT

## 2018-06-11 PROCEDURE — 83605 ASSAY OF LACTIC ACID: CPT

## 2018-06-11 PROCEDURE — 96375 TX/PRO/DX INJ NEW DRUG ADDON: CPT

## 2018-06-11 PROCEDURE — 71045 X-RAY EXAM CHEST 1 VIEW: CPT

## 2018-06-11 PROCEDURE — 93005 ELECTROCARDIOGRAM TRACING: CPT

## 2018-06-11 PROCEDURE — 6360000002 HC RX W HCPCS: Performed by: EMERGENCY MEDICINE

## 2018-06-11 PROCEDURE — 2500000003 HC RX 250 WO HCPCS: Performed by: EMERGENCY MEDICINE

## 2018-06-11 PROCEDURE — 96376 TX/PRO/DX INJ SAME DRUG ADON: CPT

## 2018-06-11 PROCEDURE — 2060000000 HC ICU INTERMEDIATE R&B

## 2018-06-11 PROCEDURE — 84484 ASSAY OF TROPONIN QUANT: CPT

## 2018-06-11 PROCEDURE — 2580000003 HC RX 258: Performed by: EMERGENCY MEDICINE

## 2018-06-11 RX ORDER — DILTIAZEM HYDROCHLORIDE 5 MG/ML
20 INJECTION INTRAVENOUS ONCE
Status: COMPLETED | OUTPATIENT
Start: 2018-06-11 | End: 2018-06-11

## 2018-06-11 RX ORDER — METOPROLOL TARTRATE 5 MG/5ML
5 INJECTION INTRAVENOUS ONCE
Status: COMPLETED | OUTPATIENT
Start: 2018-06-11 | End: 2018-06-11

## 2018-06-11 RX ORDER — DILTIAZEM HYDROCHLORIDE 5 MG/ML
10 INJECTION INTRAVENOUS ONCE
Status: COMPLETED | OUTPATIENT
Start: 2018-06-11 | End: 2018-06-11

## 2018-06-11 RX ADMIN — CEFTRIAXONE SODIUM 1 G: 1 INJECTION, POWDER, FOR SOLUTION INTRAMUSCULAR; INTRAVENOUS at 23:25

## 2018-06-11 RX ADMIN — METOROPROLOL TARTRATE 5 MG: 5 INJECTION, SOLUTION INTRAVENOUS at 22:21

## 2018-06-11 RX ADMIN — METOROPROLOL TARTRATE 5 MG: 5 INJECTION, SOLUTION INTRAVENOUS at 21:49

## 2018-06-11 RX ADMIN — DILTIAZEM HYDROCHLORIDE 10 MG: 5 INJECTION INTRAVENOUS at 21:16

## 2018-06-11 RX ADMIN — DILTIAZEM HYDROCHLORIDE 20 MG: 5 INJECTION INTRAVENOUS at 22:48

## 2018-06-11 ASSESSMENT — ENCOUNTER SYMPTOMS
WHEEZING: 0
COUGH: 0
DIARRHEA: 0
NAUSEA: 0
CHEST TIGHTNESS: 0
SORE THROAT: 0
EYE DISCHARGE: 0
PHOTOPHOBIA: 0
ABDOMINAL PAIN: 0
SHORTNESS OF BREATH: 1
ABDOMINAL DISTENTION: 0
VOMITING: 0

## 2018-06-12 LAB
ANION GAP SERPL CALCULATED.3IONS-SCNC: 8 MEQ/L (ref 7–13)
BASOPHILS ABSOLUTE: 0 K/UL (ref 0–0.2)
BASOPHILS RELATIVE PERCENT: 0.5 %
BUN BLDV-MCNC: 21 MG/DL (ref 8–23)
CALCIUM SERPL-MCNC: 8.6 MG/DL (ref 8.6–10.2)
CHLORIDE BLD-SCNC: 102 MEQ/L (ref 98–107)
CO2: 26 MEQ/L (ref 22–29)
CREAT SERPL-MCNC: 0.93 MG/DL (ref 0.7–1.2)
EOSINOPHILS ABSOLUTE: 0.1 K/UL (ref 0–0.7)
EOSINOPHILS RELATIVE PERCENT: 2 %
GFR AFRICAN AMERICAN: >60
GFR NON-AFRICAN AMERICAN: >60
GLUCOSE BLD-MCNC: 103 MG/DL (ref 74–109)
HCT VFR BLD CALC: 41.8 % (ref 42–52)
HEMOGLOBIN: 13.8 G/DL (ref 14–18)
INR BLD: 17.1
INR BLD: >20
LV EF: 50 %
LVEF MODALITY: NORMAL
LYMPHOCYTES ABSOLUTE: 0.6 K/UL (ref 1–4.8)
LYMPHOCYTES RELATIVE PERCENT: 10.6 %
MCH RBC QN AUTO: 30.4 PG (ref 27–31.3)
MCHC RBC AUTO-ENTMCNC: 33.1 % (ref 33–37)
MCV RBC AUTO: 91.8 FL (ref 80–100)
MONOCYTES ABSOLUTE: 0.4 K/UL (ref 0.2–0.8)
MONOCYTES RELATIVE PERCENT: 6.4 %
NEUTROPHILS ABSOLUTE: 4.9 K/UL (ref 1.4–6.5)
NEUTROPHILS RELATIVE PERCENT: 80.5 %
PDW BLD-RTO: 14.9 % (ref 11.5–14.5)
PLATELET # BLD: 158 K/UL (ref 130–400)
POTASSIUM SERPL-SCNC: 4.2 MEQ/L (ref 3.5–5.1)
PROTHROMBIN TIME: 184.8 SEC (ref 9.6–12.3)
PROTHROMBIN TIME: >198 SEC (ref 9.6–12.3)
RBC # BLD: 4.55 M/UL (ref 4.7–6.1)
SODIUM BLD-SCNC: 136 MEQ/L (ref 132–144)
T4 FREE: 1.33 NG/DL (ref 0.93–1.7)
TROPONIN: 0.03 NG/ML (ref 0–0.01)
TROPONIN: 0.03 NG/ML (ref 0–0.01)
TSH REFLEX: 4.42 UIU/ML (ref 0.27–4.2)
WBC # BLD: 6 K/UL (ref 4.8–10.8)

## 2018-06-12 PROCEDURE — 84145 PROCALCITONIN (PCT): CPT

## 2018-06-12 PROCEDURE — 6360000002 HC RX W HCPCS: Performed by: INTERNAL MEDICINE

## 2018-06-12 PROCEDURE — 85025 COMPLETE CBC W/AUTO DIFF WBC: CPT

## 2018-06-12 PROCEDURE — 93306 TTE W/DOPPLER COMPLETE: CPT

## 2018-06-12 PROCEDURE — 2580000003 HC RX 258: Performed by: INTERNAL MEDICINE

## 2018-06-12 PROCEDURE — 85610 PROTHROMBIN TIME: CPT

## 2018-06-12 PROCEDURE — 80048 BASIC METABOLIC PNL TOTAL CA: CPT

## 2018-06-12 PROCEDURE — 2700000000 HC OXYGEN THERAPY PER DAY

## 2018-06-12 PROCEDURE — 93010 ELECTROCARDIOGRAM REPORT: CPT | Performed by: INTERNAL MEDICINE

## 2018-06-12 PROCEDURE — 6370000000 HC RX 637 (ALT 250 FOR IP): Performed by: INTERNAL MEDICINE

## 2018-06-12 PROCEDURE — 36415 COLL VENOUS BLD VENIPUNCTURE: CPT

## 2018-06-12 PROCEDURE — 2060000000 HC ICU INTERMEDIATE R&B

## 2018-06-12 PROCEDURE — 84439 ASSAY OF FREE THYROXINE: CPT

## 2018-06-12 PROCEDURE — 2500000003 HC RX 250 WO HCPCS: Performed by: INTERNAL MEDICINE

## 2018-06-12 PROCEDURE — 84443 ASSAY THYROID STIM HORMONE: CPT

## 2018-06-12 PROCEDURE — 93005 ELECTROCARDIOGRAM TRACING: CPT

## 2018-06-12 PROCEDURE — 94664 DEMO&/EVAL PT USE INHALER: CPT

## 2018-06-12 PROCEDURE — 84484 ASSAY OF TROPONIN QUANT: CPT

## 2018-06-12 RX ORDER — FLUTICASONE PROPIONATE 50 MCG
1 SPRAY, SUSPENSION (ML) NASAL DAILY
Status: DISCONTINUED | OUTPATIENT
Start: 2018-06-12 | End: 2018-06-20 | Stop reason: HOSPADM

## 2018-06-12 RX ORDER — SODIUM CHLORIDE 0.9 % (FLUSH) 0.9 %
10 SYRINGE (ML) INJECTION EVERY 12 HOURS SCHEDULED
Status: DISCONTINUED | OUTPATIENT
Start: 2018-06-12 | End: 2018-06-20 | Stop reason: HOSPADM

## 2018-06-12 RX ORDER — TIMOLOL MALEATE 5 MG/ML
1 SOLUTION/ DROPS OPHTHALMIC 2 TIMES DAILY
Status: DISCONTINUED | OUTPATIENT
Start: 2018-06-12 | End: 2018-06-20 | Stop reason: HOSPADM

## 2018-06-12 RX ORDER — BRIMONIDINE TARTRATE 2 MG/ML
1 SOLUTION/ DROPS OPHTHALMIC 2 TIMES DAILY
Status: DISCONTINUED | OUTPATIENT
Start: 2018-06-12 | End: 2018-06-20 | Stop reason: HOSPADM

## 2018-06-12 RX ORDER — SOTALOL HYDROCHLORIDE 80 MG/1
80 TABLET ORAL 2 TIMES DAILY
Status: DISCONTINUED | OUTPATIENT
Start: 2018-06-12 | End: 2018-06-12

## 2018-06-12 RX ORDER — ONDANSETRON 2 MG/ML
4 INJECTION INTRAMUSCULAR; INTRAVENOUS EVERY 6 HOURS PRN
Status: DISCONTINUED | OUTPATIENT
Start: 2018-06-12 | End: 2018-06-20 | Stop reason: HOSPADM

## 2018-06-12 RX ORDER — METOPROLOL TARTRATE 5 MG/5ML
2.5 INJECTION INTRAVENOUS EVERY 4 HOURS PRN
Status: DISCONTINUED | OUTPATIENT
Start: 2018-06-12 | End: 2018-06-14

## 2018-06-12 RX ORDER — BRIMONIDINE TARTRATE, TIMOLOL MALEATE 2; 5 MG/ML; MG/ML
1 SOLUTION/ DROPS OPHTHALMIC EVERY 12 HOURS
Status: DISCONTINUED | OUTPATIENT
Start: 2018-06-12 | End: 2018-06-12 | Stop reason: CLARIF

## 2018-06-12 RX ORDER — METOPROLOL TARTRATE 5 MG/5ML
5 INJECTION INTRAVENOUS EVERY 6 HOURS
Status: DISCONTINUED | OUTPATIENT
Start: 2018-06-12 | End: 2018-06-12

## 2018-06-12 RX ORDER — ACETAMINOPHEN 80 MG
TABLET,CHEWABLE ORAL ONCE
Status: COMPLETED | OUTPATIENT
Start: 2018-06-12 | End: 2018-06-12

## 2018-06-12 RX ORDER — PHYTONADIONE 5 MG/1
5 TABLET ORAL ONCE
Status: COMPLETED | OUTPATIENT
Start: 2018-06-12 | End: 2018-06-12

## 2018-06-12 RX ORDER — LATANOPROST 50 UG/ML
1 SOLUTION/ DROPS OPHTHALMIC NIGHTLY
Status: DISCONTINUED | OUTPATIENT
Start: 2018-06-12 | End: 2018-06-20 | Stop reason: HOSPADM

## 2018-06-12 RX ORDER — IPRATROPIUM BROMIDE AND ALBUTEROL SULFATE 2.5; .5 MG/3ML; MG/3ML
1 SOLUTION RESPIRATORY (INHALATION) EVERY 4 HOURS PRN
Status: DISCONTINUED | OUTPATIENT
Start: 2018-06-12 | End: 2018-06-20 | Stop reason: HOSPADM

## 2018-06-12 RX ORDER — IPRATROPIUM BROMIDE AND ALBUTEROL SULFATE 2.5; .5 MG/3ML; MG/3ML
1 SOLUTION RESPIRATORY (INHALATION) EVERY 8 HOURS
Status: DISCONTINUED | OUTPATIENT
Start: 2018-06-12 | End: 2018-06-12

## 2018-06-12 RX ORDER — SODIUM CHLORIDE 9 MG/ML
INJECTION, SOLUTION INTRAVENOUS CONTINUOUS
Status: DISCONTINUED | OUTPATIENT
Start: 2018-06-12 | End: 2018-06-13

## 2018-06-12 RX ORDER — DIGOXIN 0.25 MG/ML
500 INJECTION INTRAMUSCULAR; INTRAVENOUS ONCE
Status: COMPLETED | OUTPATIENT
Start: 2018-06-12 | End: 2018-06-12

## 2018-06-12 RX ORDER — SODIUM CHLORIDE 0.9 % (FLUSH) 0.9 %
10 SYRINGE (ML) INJECTION PRN
Status: DISCONTINUED | OUTPATIENT
Start: 2018-06-12 | End: 2018-06-20 | Stop reason: HOSPADM

## 2018-06-12 RX ADMIN — DOXYCYCLINE 100 MG: 100 INJECTION, POWDER, LYOPHILIZED, FOR SOLUTION INTRAVENOUS at 11:50

## 2018-06-12 RX ADMIN — FLUTICASONE PROPIONATE 1 SPRAY: 50 SPRAY, METERED NASAL at 08:40

## 2018-06-12 RX ADMIN — SODIUM CHLORIDE: 9 INJECTION, SOLUTION INTRAVENOUS at 02:47

## 2018-06-12 RX ADMIN — Medication 10 ML: at 08:40

## 2018-06-12 RX ADMIN — SOTALOL HYDROCHLORIDE 80 MG: 80 TABLET ORAL at 02:52

## 2018-06-12 RX ADMIN — SOTALOL HYDROCHLORIDE 80 MG: 80 TABLET ORAL at 08:40

## 2018-06-12 RX ADMIN — TIMOLOL MALEATE 1 DROP: 5 SOLUTION OPHTHALMIC at 08:45

## 2018-06-12 RX ADMIN — BRIMONIDINE TARTRATE 1 DROP: 2 SOLUTION OPHTHALMIC at 20:43

## 2018-06-12 RX ADMIN — SODIUM CHLORIDE: 9 INJECTION, SOLUTION INTRAVENOUS at 17:28

## 2018-06-12 RX ADMIN — BRIMONIDINE TARTRATE 1 DROP: 2 SOLUTION OPHTHALMIC at 08:44

## 2018-06-12 RX ADMIN — DILTIAZEM HYDROCHLORIDE 5 MG/HR: 5 INJECTION INTRAVENOUS at 01:38

## 2018-06-12 RX ADMIN — DILTIAZEM HYDROCHLORIDE 15 MG/HR: 5 INJECTION INTRAVENOUS at 20:53

## 2018-06-12 RX ADMIN — PHYTONADIONE 5 MG: 5 TABLET ORAL at 02:52

## 2018-06-12 RX ADMIN — TIMOLOL MALEATE 1 DROP: 5 SOLUTION OPHTHALMIC at 20:43

## 2018-06-12 RX ADMIN — Medication: at 20:50

## 2018-06-12 RX ADMIN — METOPROLOL TARTRATE 12.5 MG: 25 TABLET ORAL at 20:50

## 2018-06-12 RX ADMIN — LATANOPROST 1 DROP: 50 SOLUTION OPHTHALMIC at 20:42

## 2018-06-12 RX ADMIN — METOPROLOL TARTRATE 12.5 MG: 25 TABLET ORAL at 16:33

## 2018-06-12 RX ADMIN — DIGOXIN 500 MCG: 250 INJECTION, SOLUTION INTRAMUSCULAR; INTRAVENOUS; PARENTERAL at 10:58

## 2018-06-12 RX ADMIN — DOXYCYCLINE 100 MG: 100 INJECTION, POWDER, LYOPHILIZED, FOR SOLUTION INTRAVENOUS at 02:49

## 2018-06-12 ASSESSMENT — PAIN SCALES - GENERAL
PAINLEVEL_OUTOF10: 0

## 2018-06-13 LAB
ANION GAP SERPL CALCULATED.3IONS-SCNC: 9 MEQ/L (ref 7–13)
BASOPHILS ABSOLUTE: 0 K/UL (ref 0–0.2)
BASOPHILS RELATIVE PERCENT: 0.6 %
BUN BLDV-MCNC: 19 MG/DL (ref 8–23)
CALCIUM SERPL-MCNC: 8.4 MG/DL (ref 8.6–10.2)
CHLORIDE BLD-SCNC: 102 MEQ/L (ref 98–107)
CO2: 25 MEQ/L (ref 22–29)
CREAT SERPL-MCNC: 0.9 MG/DL (ref 0.7–1.2)
EOSINOPHILS ABSOLUTE: 0.2 K/UL (ref 0–0.7)
EOSINOPHILS RELATIVE PERCENT: 3.3 %
GFR AFRICAN AMERICAN: >60
GFR NON-AFRICAN AMERICAN: >60
GLUCOSE BLD-MCNC: 98 MG/DL (ref 74–109)
HCT VFR BLD CALC: 39.8 % (ref 42–52)
HEMOGLOBIN: 13 G/DL (ref 14–18)
INR BLD: 3.9
LYMPHOCYTES ABSOLUTE: 0.5 K/UL (ref 1–4.8)
LYMPHOCYTES RELATIVE PERCENT: 8.5 %
MCH RBC QN AUTO: 29.9 PG (ref 27–31.3)
MCHC RBC AUTO-ENTMCNC: 32.7 % (ref 33–37)
MCV RBC AUTO: 91.6 FL (ref 80–100)
MONOCYTES ABSOLUTE: 0.5 K/UL (ref 0.2–0.8)
MONOCYTES RELATIVE PERCENT: 7.5 %
NEUTROPHILS ABSOLUTE: 5 K/UL (ref 1.4–6.5)
NEUTROPHILS RELATIVE PERCENT: 80.1 %
PDW BLD-RTO: 15 % (ref 11.5–14.5)
PLATELET # BLD: 143 K/UL (ref 130–400)
POTASSIUM SERPL-SCNC: 4.3 MEQ/L (ref 3.5–5.1)
PROCALCITONIN: <0.07 NG/ML
PROTHROMBIN TIME: 42 SEC (ref 9.6–12.3)
RBC # BLD: 4.35 M/UL (ref 4.7–6.1)
SODIUM BLD-SCNC: 136 MEQ/L (ref 132–144)
WBC # BLD: 6.3 K/UL (ref 4.8–10.8)

## 2018-06-13 PROCEDURE — 97162 PT EVAL MOD COMPLEX 30 MIN: CPT

## 2018-06-13 PROCEDURE — 97535 SELF CARE MNGMENT TRAINING: CPT

## 2018-06-13 PROCEDURE — 93005 ELECTROCARDIOGRAM TRACING: CPT

## 2018-06-13 PROCEDURE — 85610 PROTHROMBIN TIME: CPT

## 2018-06-13 PROCEDURE — 2580000003 HC RX 258: Performed by: INTERNAL MEDICINE

## 2018-06-13 PROCEDURE — G8979 MOBILITY GOAL STATUS: HCPCS

## 2018-06-13 PROCEDURE — 2500000003 HC RX 250 WO HCPCS: Performed by: INTERNAL MEDICINE

## 2018-06-13 PROCEDURE — 36415 COLL VENOUS BLD VENIPUNCTURE: CPT

## 2018-06-13 PROCEDURE — 6360000002 HC RX W HCPCS: Performed by: INTERNAL MEDICINE

## 2018-06-13 PROCEDURE — 87449 NOS EACH ORGANISM AG IA: CPT

## 2018-06-13 PROCEDURE — 6370000000 HC RX 637 (ALT 250 FOR IP): Performed by: INTERNAL MEDICINE

## 2018-06-13 PROCEDURE — 85025 COMPLETE CBC W/AUTO DIFF WBC: CPT

## 2018-06-13 PROCEDURE — G8978 MOBILITY CURRENT STATUS: HCPCS

## 2018-06-13 PROCEDURE — 97166 OT EVAL MOD COMPLEX 45 MIN: CPT

## 2018-06-13 PROCEDURE — 2700000000 HC OXYGEN THERAPY PER DAY

## 2018-06-13 PROCEDURE — 2060000000 HC ICU INTERMEDIATE R&B

## 2018-06-13 PROCEDURE — G8988 SELF CARE GOAL STATUS: HCPCS

## 2018-06-13 PROCEDURE — 80048 BASIC METABOLIC PNL TOTAL CA: CPT

## 2018-06-13 PROCEDURE — 97110 THERAPEUTIC EXERCISES: CPT

## 2018-06-13 PROCEDURE — G8987 SELF CARE CURRENT STATUS: HCPCS

## 2018-06-13 RX ADMIN — METOPROLOL TARTRATE 12.5 MG: 25 TABLET ORAL at 18:47

## 2018-06-13 RX ADMIN — TIMOLOL MALEATE 1 DROP: 5 SOLUTION OPHTHALMIC at 10:04

## 2018-06-13 RX ADMIN — AMIODARONE HYDROCHLORIDE 0.5 MG/MIN: 50 INJECTION, SOLUTION INTRAVENOUS at 22:30

## 2018-06-13 RX ADMIN — BRIMONIDINE TARTRATE 1 DROP: 2 SOLUTION OPHTHALMIC at 21:09

## 2018-06-13 RX ADMIN — DEXTROSE MONOHYDRATE 150 MG: 50 INJECTION, SOLUTION INTRAVENOUS at 16:22

## 2018-06-13 RX ADMIN — Medication 10 ML: at 21:09

## 2018-06-13 RX ADMIN — DOXYCYCLINE 100 MG: 100 INJECTION, POWDER, LYOPHILIZED, FOR SOLUTION INTRAVENOUS at 02:46

## 2018-06-13 RX ADMIN — Medication 10 ML: at 10:04

## 2018-06-13 RX ADMIN — BRIMONIDINE TARTRATE 1 DROP: 2 SOLUTION OPHTHALMIC at 10:04

## 2018-06-13 RX ADMIN — DOXYCYCLINE 100 MG: 100 INJECTION, POWDER, LYOPHILIZED, FOR SOLUTION INTRAVENOUS at 14:13

## 2018-06-13 RX ADMIN — DILTIAZEM HYDROCHLORIDE 15 MG/HR: 5 INJECTION INTRAVENOUS at 13:12

## 2018-06-13 RX ADMIN — LATANOPROST 1 DROP: 50 SOLUTION OPHTHALMIC at 21:09

## 2018-06-13 RX ADMIN — DEXTROSE MONOHYDRATE 1 MG/MIN: 5 INJECTION, SOLUTION INTRAVENOUS at 16:37

## 2018-06-13 RX ADMIN — DILTIAZEM HYDROCHLORIDE 15 MG/HR: 5 INJECTION INTRAVENOUS at 04:22

## 2018-06-13 RX ADMIN — METOPROLOL TARTRATE 12.5 MG: 25 TABLET ORAL at 10:07

## 2018-06-13 RX ADMIN — CEFTRIAXONE SODIUM 1 G: 1 INJECTION, POWDER, FOR SOLUTION INTRAMUSCULAR; INTRAVENOUS at 00:30

## 2018-06-13 RX ADMIN — TIMOLOL MALEATE 1 DROP: 5 SOLUTION OPHTHALMIC at 21:09

## 2018-06-13 ASSESSMENT — PAIN SCALES - GENERAL
PAINLEVEL_OUTOF10: 0
PAINLEVEL_OUTOF10: 0

## 2018-06-14 LAB
ANION GAP SERPL CALCULATED.3IONS-SCNC: 14 MEQ/L (ref 7–13)
BASOPHILS ABSOLUTE: 0 K/UL (ref 0–0.2)
BASOPHILS RELATIVE PERCENT: 0.4 %
BUN BLDV-MCNC: 17 MG/DL (ref 8–23)
CALCIUM SERPL-MCNC: 8.5 MG/DL (ref 8.6–10.2)
CHLORIDE BLD-SCNC: 102 MEQ/L (ref 98–107)
CO2: 20 MEQ/L (ref 22–29)
CREAT SERPL-MCNC: 0.82 MG/DL (ref 0.7–1.2)
EOSINOPHILS ABSOLUTE: 0.2 K/UL (ref 0–0.7)
EOSINOPHILS RELATIVE PERCENT: 2.4 %
GFR AFRICAN AMERICAN: >60
GFR NON-AFRICAN AMERICAN: >60
GLUCOSE BLD-MCNC: 82 MG/DL (ref 74–109)
HCT VFR BLD CALC: 42.9 % (ref 42–52)
HEMOGLOBIN: 13.8 G/DL (ref 14–18)
INR BLD: 2.6
L. PNEUMOPHILA SEROGP 1 UR AG: NEGATIVE
LYMPHOCYTES ABSOLUTE: 0.6 K/UL (ref 1–4.8)
LYMPHOCYTES RELATIVE PERCENT: 7.6 %
MCH RBC QN AUTO: 29.6 PG (ref 27–31.3)
MCHC RBC AUTO-ENTMCNC: 32.2 % (ref 33–37)
MCV RBC AUTO: 91.9 FL (ref 80–100)
MONOCYTES ABSOLUTE: 0.5 K/UL (ref 0.2–0.8)
MONOCYTES RELATIVE PERCENT: 6.6 %
NEUTROPHILS ABSOLUTE: 6.4 K/UL (ref 1.4–6.5)
NEUTROPHILS RELATIVE PERCENT: 83 %
PDW BLD-RTO: 14.8 % (ref 11.5–14.5)
PLATELET # BLD: 133 K/UL (ref 130–400)
POTASSIUM SERPL-SCNC: 5.1 MEQ/L (ref 3.5–5.1)
PROTHROMBIN TIME: 27.5 SEC (ref 9.6–12.3)
RBC # BLD: 4.66 M/UL (ref 4.7–6.1)
SODIUM BLD-SCNC: 136 MEQ/L (ref 132–144)
WBC # BLD: 7.8 K/UL (ref 4.8–10.8)

## 2018-06-14 PROCEDURE — 2060000000 HC ICU INTERMEDIATE R&B

## 2018-06-14 PROCEDURE — 6360000002 HC RX W HCPCS: Performed by: INTERNAL MEDICINE

## 2018-06-14 PROCEDURE — 6370000000 HC RX 637 (ALT 250 FOR IP): Performed by: INTERNAL MEDICINE

## 2018-06-14 PROCEDURE — 97535 SELF CARE MNGMENT TRAINING: CPT

## 2018-06-14 PROCEDURE — 93005 ELECTROCARDIOGRAM TRACING: CPT

## 2018-06-14 PROCEDURE — 93308 TTE F-UP OR LMTD: CPT

## 2018-06-14 PROCEDURE — 85025 COMPLETE CBC W/AUTO DIFF WBC: CPT

## 2018-06-14 PROCEDURE — 36415 COLL VENOUS BLD VENIPUNCTURE: CPT

## 2018-06-14 PROCEDURE — 2700000000 HC OXYGEN THERAPY PER DAY

## 2018-06-14 PROCEDURE — 2500000003 HC RX 250 WO HCPCS: Performed by: INTERNAL MEDICINE

## 2018-06-14 PROCEDURE — 2580000003 HC RX 258: Performed by: INTERNAL MEDICINE

## 2018-06-14 PROCEDURE — 85610 PROTHROMBIN TIME: CPT

## 2018-06-14 PROCEDURE — 80048 BASIC METABOLIC PNL TOTAL CA: CPT

## 2018-06-14 RX ORDER — METOPROLOL TARTRATE 5 MG/5ML
5 INJECTION INTRAVENOUS EVERY 4 HOURS PRN
Status: DISCONTINUED | OUTPATIENT
Start: 2018-06-14 | End: 2018-06-18

## 2018-06-14 RX ADMIN — LATANOPROST 1 DROP: 50 SOLUTION OPHTHALMIC at 20:23

## 2018-06-14 RX ADMIN — Medication 10 ML: at 20:27

## 2018-06-14 RX ADMIN — AMIODARONE HYDROCHLORIDE 0.5 MG/MIN: 50 INJECTION, SOLUTION INTRAVENOUS at 02:01

## 2018-06-14 RX ADMIN — BRIMONIDINE TARTRATE 1 DROP: 2 SOLUTION OPHTHALMIC at 08:16

## 2018-06-14 RX ADMIN — CEFTRIAXONE SODIUM 1 G: 1 INJECTION, POWDER, FOR SOLUTION INTRAMUSCULAR; INTRAVENOUS at 01:30

## 2018-06-14 RX ADMIN — BRIMONIDINE TARTRATE 1 DROP: 2 SOLUTION OPHTHALMIC at 20:23

## 2018-06-14 RX ADMIN — METOPROLOL TARTRATE 25 MG: 25 TABLET ORAL at 20:23

## 2018-06-14 RX ADMIN — Medication 10 ML: at 08:17

## 2018-06-14 RX ADMIN — AMIODARONE HYDROCHLORIDE 150 MG: 50 INJECTION, SOLUTION INTRAVENOUS at 11:38

## 2018-06-14 RX ADMIN — TIMOLOL MALEATE 1 DROP: 5 SOLUTION OPHTHALMIC at 20:23

## 2018-06-14 RX ADMIN — METOPROLOL TARTRATE 12.5 MG: 25 TABLET ORAL at 08:16

## 2018-06-14 RX ADMIN — AMIODARONE HYDROCHLORIDE 0.5 MG/MIN: 50 INJECTION, SOLUTION INTRAVENOUS at 22:09

## 2018-06-14 RX ADMIN — APIXABAN 5 MG: 5 TABLET, FILM COATED ORAL at 20:23

## 2018-06-14 RX ADMIN — METOPROLOL TARTRATE 12.5 MG: 25 TABLET ORAL at 01:31

## 2018-06-14 RX ADMIN — METOPROLOL TARTRATE 25 MG: 25 TABLET ORAL at 17:24

## 2018-06-14 RX ADMIN — DOXYCYCLINE 100 MG: 100 INJECTION, POWDER, LYOPHILIZED, FOR SOLUTION INTRAVENOUS at 03:04

## 2018-06-14 RX ADMIN — METOPROLOL TARTRATE 25 MG: 25 TABLET ORAL at 13:43

## 2018-06-14 RX ADMIN — TIMOLOL MALEATE 1 DROP: 5 SOLUTION OPHTHALMIC at 08:16

## 2018-06-14 RX ADMIN — DOXYCYCLINE 100 MG: 100 INJECTION, POWDER, LYOPHILIZED, FOR SOLUTION INTRAVENOUS at 13:43

## 2018-06-14 ASSESSMENT — PAIN SCALES - GENERAL
PAINLEVEL_OUTOF10: 0

## 2018-06-15 ENCOUNTER — TELEPHONE (OUTPATIENT)
Dept: PHARMACY | Age: 83
End: 2018-06-15

## 2018-06-15 DIAGNOSIS — I48.91 ATRIAL FIBRILLATION, UNSPECIFIED TYPE (HCC): ICD-10-CM

## 2018-06-15 LAB
ANION GAP SERPL CALCULATED.3IONS-SCNC: 11 MEQ/L (ref 7–13)
BASOPHILS ABSOLUTE: 0 K/UL (ref 0–0.2)
BASOPHILS RELATIVE PERCENT: 0.3 %
BUN BLDV-MCNC: 16 MG/DL (ref 8–23)
CALCIUM SERPL-MCNC: 8.1 MG/DL (ref 8.6–10.2)
CHLORIDE BLD-SCNC: 101 MEQ/L (ref 98–107)
CO2: 24 MEQ/L (ref 22–29)
CREAT SERPL-MCNC: 0.85 MG/DL (ref 0.7–1.2)
EOSINOPHILS ABSOLUTE: 0.1 K/UL (ref 0–0.7)
EOSINOPHILS RELATIVE PERCENT: 2 %
GFR AFRICAN AMERICAN: >60
GFR NON-AFRICAN AMERICAN: >60
GLUCOSE BLD-MCNC: 113 MG/DL (ref 60–115)
GLUCOSE BLD-MCNC: 83 MG/DL (ref 74–109)
HCT VFR BLD CALC: 44.6 % (ref 42–52)
HEMOGLOBIN: 14.9 G/DL (ref 14–18)
INR BLD: 2
LYMPHOCYTES ABSOLUTE: 0.7 K/UL (ref 1–4.8)
LYMPHOCYTES RELATIVE PERCENT: 9.3 %
MCH RBC QN AUTO: 30.4 PG (ref 27–31.3)
MCHC RBC AUTO-ENTMCNC: 33.4 % (ref 33–37)
MCV RBC AUTO: 91 FL (ref 80–100)
MONOCYTES ABSOLUTE: 0.7 K/UL (ref 0.2–0.8)
MONOCYTES RELATIVE PERCENT: 9.4 %
NEUTROPHILS ABSOLUTE: 5.8 K/UL (ref 1.4–6.5)
NEUTROPHILS RELATIVE PERCENT: 79 %
PDW BLD-RTO: 14.8 % (ref 11.5–14.5)
PERFORMED ON: NORMAL
PLATELET # BLD: 135 K/UL (ref 130–400)
POTASSIUM SERPL-SCNC: 3.9 MEQ/L (ref 3.5–5.1)
PROTHROMBIN TIME: 21.4 SEC (ref 9.6–12.3)
RBC # BLD: 4.9 M/UL (ref 4.7–6.1)
SODIUM BLD-SCNC: 136 MEQ/L (ref 132–144)
WBC # BLD: 7.3 K/UL (ref 4.8–10.8)

## 2018-06-15 PROCEDURE — 6370000000 HC RX 637 (ALT 250 FOR IP): Performed by: INTERNAL MEDICINE

## 2018-06-15 PROCEDURE — 36415 COLL VENOUS BLD VENIPUNCTURE: CPT

## 2018-06-15 PROCEDURE — 6360000002 HC RX W HCPCS: Performed by: INTERNAL MEDICINE

## 2018-06-15 PROCEDURE — 93005 ELECTROCARDIOGRAM TRACING: CPT

## 2018-06-15 PROCEDURE — 2580000003 HC RX 258: Performed by: INTERNAL MEDICINE

## 2018-06-15 PROCEDURE — 80048 BASIC METABOLIC PNL TOTAL CA: CPT

## 2018-06-15 PROCEDURE — 2500000003 HC RX 250 WO HCPCS: Performed by: INTERNAL MEDICINE

## 2018-06-15 PROCEDURE — 85025 COMPLETE CBC W/AUTO DIFF WBC: CPT

## 2018-06-15 PROCEDURE — 85610 PROTHROMBIN TIME: CPT

## 2018-06-15 PROCEDURE — 2060000000 HC ICU INTERMEDIATE R&B

## 2018-06-15 RX ORDER — AMIODARONE HYDROCHLORIDE 200 MG/1
200 TABLET ORAL 2 TIMES DAILY
Status: DISCONTINUED | OUTPATIENT
Start: 2018-06-15 | End: 2018-06-16

## 2018-06-15 RX ADMIN — METOPROLOL TARTRATE 25 MG: 25 TABLET ORAL at 21:03

## 2018-06-15 RX ADMIN — METOROPROLOL TARTRATE 5 MG: 5 INJECTION, SOLUTION INTRAVENOUS at 04:56

## 2018-06-15 RX ADMIN — Medication 10 ML: at 05:00

## 2018-06-15 RX ADMIN — APIXABAN 5 MG: 5 TABLET, FILM COATED ORAL at 08:59

## 2018-06-15 RX ADMIN — BRIMONIDINE TARTRATE 1 DROP: 2 SOLUTION OPHTHALMIC at 20:53

## 2018-06-15 RX ADMIN — BRIMONIDINE TARTRATE 1 DROP: 2 SOLUTION OPHTHALMIC at 08:59

## 2018-06-15 RX ADMIN — METOPROLOL TARTRATE 25 MG: 25 TABLET ORAL at 13:25

## 2018-06-15 RX ADMIN — AMIODARONE HYDROCHLORIDE 200 MG: 200 TABLET ORAL at 21:04

## 2018-06-15 RX ADMIN — LATANOPROST 1 DROP: 50 SOLUTION OPHTHALMIC at 21:06

## 2018-06-15 RX ADMIN — TIMOLOL MALEATE 1 DROP: 5 SOLUTION OPHTHALMIC at 20:57

## 2018-06-15 RX ADMIN — AMIODARONE HYDROCHLORIDE 200 MG: 200 TABLET ORAL at 13:25

## 2018-06-15 RX ADMIN — METOPROLOL TARTRATE 25 MG: 25 TABLET ORAL at 18:15

## 2018-06-15 RX ADMIN — APIXABAN 5 MG: 5 TABLET, FILM COATED ORAL at 21:04

## 2018-06-15 RX ADMIN — FLUTICASONE PROPIONATE 1 SPRAY: 50 SPRAY, METERED NASAL at 08:59

## 2018-06-15 RX ADMIN — CEFTRIAXONE SODIUM 1 G: 1 INJECTION, POWDER, FOR SOLUTION INTRAMUSCULAR; INTRAVENOUS at 03:31

## 2018-06-15 RX ADMIN — Medication 10 ML: at 21:04

## 2018-06-15 RX ADMIN — DOXYCYCLINE 100 MG: 100 INJECTION, POWDER, LYOPHILIZED, FOR SOLUTION INTRAVENOUS at 14:26

## 2018-06-15 RX ADMIN — TIMOLOL MALEATE 1 DROP: 5 SOLUTION OPHTHALMIC at 08:59

## 2018-06-15 RX ADMIN — DOXYCYCLINE 100 MG: 100 INJECTION, POWDER, LYOPHILIZED, FOR SOLUTION INTRAVENOUS at 02:58

## 2018-06-15 RX ADMIN — METOPROLOL TARTRATE 25 MG: 25 TABLET ORAL at 08:59

## 2018-06-15 ASSESSMENT — PAIN SCALES - GENERAL
PAINLEVEL_OUTOF10: 0

## 2018-06-15 ASSESSMENT — ENCOUNTER SYMPTOMS
DIARRHEA: 0
VOMITING: 0
SHORTNESS OF BREATH: 0
NAUSEA: 0
COUGH: 0

## 2018-06-16 LAB
ANION GAP SERPL CALCULATED.3IONS-SCNC: 13 MEQ/L (ref 7–13)
BASOPHILS ABSOLUTE: 0 K/UL (ref 0–0.2)
BASOPHILS RELATIVE PERCENT: 0.3 %
BUN BLDV-MCNC: 18 MG/DL (ref 8–23)
CALCIUM SERPL-MCNC: 8.6 MG/DL (ref 8.6–10.2)
CHLORIDE BLD-SCNC: 103 MEQ/L (ref 98–107)
CO2: 23 MEQ/L (ref 22–29)
CREAT SERPL-MCNC: 0.78 MG/DL (ref 0.7–1.2)
EOSINOPHILS ABSOLUTE: 0.1 K/UL (ref 0–0.7)
EOSINOPHILS RELATIVE PERCENT: 1.3 %
GFR AFRICAN AMERICAN: >60
GFR NON-AFRICAN AMERICAN: >60
GLUCOSE BLD-MCNC: 85 MG/DL (ref 74–109)
HCT VFR BLD CALC: 45.8 % (ref 42–52)
HEMOGLOBIN: 14.9 G/DL (ref 14–18)
INR BLD: 1.9
LYMPHOCYTES ABSOLUTE: 0.7 K/UL (ref 1–4.8)
LYMPHOCYTES RELATIVE PERCENT: 8.6 %
MCH RBC QN AUTO: 29.8 PG (ref 27–31.3)
MCHC RBC AUTO-ENTMCNC: 32.5 % (ref 33–37)
MCV RBC AUTO: 91.6 FL (ref 80–100)
MONOCYTES ABSOLUTE: 0.8 K/UL (ref 0.2–0.8)
MONOCYTES RELATIVE PERCENT: 9.1 %
NEUTROPHILS ABSOLUTE: 6.9 K/UL (ref 1.4–6.5)
NEUTROPHILS RELATIVE PERCENT: 80.7 %
PDW BLD-RTO: 14.9 % (ref 11.5–14.5)
PLATELET # BLD: 145 K/UL (ref 130–400)
POTASSIUM SERPL-SCNC: 4 MEQ/L (ref 3.5–5.1)
PROTHROMBIN TIME: 19.5 SEC (ref 9.6–12.3)
RBC # BLD: 5 M/UL (ref 4.7–6.1)
SODIUM BLD-SCNC: 139 MEQ/L (ref 132–144)
WBC # BLD: 8.6 K/UL (ref 4.8–10.8)

## 2018-06-16 PROCEDURE — 85025 COMPLETE CBC W/AUTO DIFF WBC: CPT

## 2018-06-16 PROCEDURE — 85610 PROTHROMBIN TIME: CPT

## 2018-06-16 PROCEDURE — 6370000000 HC RX 637 (ALT 250 FOR IP): Performed by: INTERNAL MEDICINE

## 2018-06-16 PROCEDURE — 2500000003 HC RX 250 WO HCPCS: Performed by: INTERNAL MEDICINE

## 2018-06-16 PROCEDURE — 36415 COLL VENOUS BLD VENIPUNCTURE: CPT

## 2018-06-16 PROCEDURE — 2700000000 HC OXYGEN THERAPY PER DAY

## 2018-06-16 PROCEDURE — 97116 GAIT TRAINING THERAPY: CPT

## 2018-06-16 PROCEDURE — 2060000000 HC ICU INTERMEDIATE R&B

## 2018-06-16 PROCEDURE — 80048 BASIC METABOLIC PNL TOTAL CA: CPT

## 2018-06-16 PROCEDURE — 93005 ELECTROCARDIOGRAM TRACING: CPT

## 2018-06-16 PROCEDURE — 2580000003 HC RX 258: Performed by: INTERNAL MEDICINE

## 2018-06-16 PROCEDURE — 6360000002 HC RX W HCPCS: Performed by: INTERNAL MEDICINE

## 2018-06-16 RX ORDER — POTASSIUM CHLORIDE 20 MEQ/1
20 TABLET, EXTENDED RELEASE ORAL 2 TIMES DAILY WITH MEALS
Status: COMPLETED | OUTPATIENT
Start: 2018-06-16 | End: 2018-06-16

## 2018-06-16 RX ORDER — DIGOXIN 125 MCG
125 TABLET ORAL DAILY
Status: DISCONTINUED | OUTPATIENT
Start: 2018-06-16 | End: 2018-06-19

## 2018-06-16 RX ORDER — AMIODARONE HYDROCHLORIDE 200 MG/1
400 TABLET ORAL 2 TIMES DAILY
Status: DISCONTINUED | OUTPATIENT
Start: 2018-06-16 | End: 2018-06-19

## 2018-06-16 RX ORDER — ACETAMINOPHEN 80 MG
TABLET,CHEWABLE ORAL ONCE
Status: COMPLETED | OUTPATIENT
Start: 2018-06-16 | End: 2018-06-16

## 2018-06-16 RX ORDER — OXYBUTYNIN CHLORIDE 5 MG/1
2.5 TABLET ORAL 3 TIMES DAILY
Status: DISCONTINUED | OUTPATIENT
Start: 2018-06-16 | End: 2018-06-20 | Stop reason: HOSPADM

## 2018-06-16 RX ORDER — FUROSEMIDE 10 MG/ML
60 INJECTION INTRAMUSCULAR; INTRAVENOUS 2 TIMES DAILY
Status: DISCONTINUED | OUTPATIENT
Start: 2018-06-16 | End: 2018-06-19

## 2018-06-16 RX ADMIN — POTASSIUM CHLORIDE 20 MEQ: 20 TABLET, EXTENDED RELEASE ORAL at 12:13

## 2018-06-16 RX ADMIN — Medication: at 21:08

## 2018-06-16 RX ADMIN — METOPROLOL TARTRATE 25 MG: 25 TABLET ORAL at 21:04

## 2018-06-16 RX ADMIN — FLUTICASONE PROPIONATE 1 SPRAY: 50 SPRAY, METERED NASAL at 09:01

## 2018-06-16 RX ADMIN — METOROPROLOL TARTRATE 5 MG: 5 INJECTION, SOLUTION INTRAVENOUS at 09:07

## 2018-06-16 RX ADMIN — BRIMONIDINE TARTRATE 1 DROP: 2 SOLUTION OPHTHALMIC at 09:01

## 2018-06-16 RX ADMIN — FUROSEMIDE 60 MG: 10 INJECTION, SOLUTION INTRAVENOUS at 17:39

## 2018-06-16 RX ADMIN — AMIODARONE HYDROCHLORIDE 400 MG: 200 TABLET ORAL at 21:04

## 2018-06-16 RX ADMIN — DOXYCYCLINE 100 MG: 100 INJECTION, POWDER, LYOPHILIZED, FOR SOLUTION INTRAVENOUS at 14:21

## 2018-06-16 RX ADMIN — POTASSIUM CHLORIDE 20 MEQ: 20 TABLET, EXTENDED RELEASE ORAL at 16:39

## 2018-06-16 RX ADMIN — METOPROLOL TARTRATE 25 MG: 25 TABLET ORAL at 16:39

## 2018-06-16 RX ADMIN — OXYBUTYNIN CHLORIDE 2.5 MG: 5 TABLET ORAL at 18:31

## 2018-06-16 RX ADMIN — METOPROLOL TARTRATE 25 MG: 25 TABLET ORAL at 09:01

## 2018-06-16 RX ADMIN — CEFTRIAXONE SODIUM 1 G: 1 INJECTION, POWDER, FOR SOLUTION INTRAMUSCULAR; INTRAVENOUS at 02:53

## 2018-06-16 RX ADMIN — Medication 10 ML: at 21:05

## 2018-06-16 RX ADMIN — BRIMONIDINE TARTRATE 1 DROP: 2 SOLUTION OPHTHALMIC at 19:35

## 2018-06-16 RX ADMIN — APIXABAN 5 MG: 5 TABLET, FILM COATED ORAL at 21:04

## 2018-06-16 RX ADMIN — FUROSEMIDE 60 MG: 10 INJECTION, SOLUTION INTRAVENOUS at 12:13

## 2018-06-16 RX ADMIN — DOXYCYCLINE 100 MG: 100 INJECTION, POWDER, LYOPHILIZED, FOR SOLUTION INTRAVENOUS at 02:06

## 2018-06-16 RX ADMIN — DIGOXIN 125 MCG: 125 TABLET ORAL at 12:13

## 2018-06-16 RX ADMIN — LATANOPROST 1 DROP: 50 SOLUTION OPHTHALMIC at 21:08

## 2018-06-16 RX ADMIN — APIXABAN 5 MG: 5 TABLET, FILM COATED ORAL at 09:01

## 2018-06-16 RX ADMIN — TIMOLOL MALEATE 1 DROP: 5 SOLUTION OPHTHALMIC at 09:01

## 2018-06-16 RX ADMIN — TIMOLOL MALEATE 1 DROP: 5 SOLUTION OPHTHALMIC at 21:09

## 2018-06-16 RX ADMIN — Medication 10 ML: at 09:08

## 2018-06-16 RX ADMIN — AMIODARONE HYDROCHLORIDE 200 MG: 200 TABLET ORAL at 09:01

## 2018-06-16 ASSESSMENT — PAIN DESCRIPTION - FREQUENCY: FREQUENCY: INTERMITTENT

## 2018-06-16 ASSESSMENT — PAIN SCALES - GENERAL
PAINLEVEL_OUTOF10: 0
PAINLEVEL_OUTOF10: 10
PAINLEVEL_OUTOF10: 0

## 2018-06-16 ASSESSMENT — PAIN DESCRIPTION - PAIN TYPE: TYPE: ACUTE PAIN

## 2018-06-16 ASSESSMENT — PAIN DESCRIPTION - LOCATION: LOCATION: PELVIS;OTHER (COMMENT)

## 2018-06-16 ASSESSMENT — PAIN DESCRIPTION - PROGRESSION: CLINICAL_PROGRESSION: RAPIDLY WORSENING

## 2018-06-16 ASSESSMENT — PAIN DESCRIPTION - ONSET: ONSET: GRADUAL

## 2018-06-17 ENCOUNTER — APPOINTMENT (OUTPATIENT)
Dept: GENERAL RADIOLOGY | Age: 83
DRG: 283 | End: 2018-06-17
Payer: MEDICARE

## 2018-06-17 LAB
ALBUMIN SERPL-MCNC: 2.7 G/DL (ref 3.9–4.9)
ALP BLD-CCNC: 66 U/L (ref 35–104)
ALT SERPL-CCNC: 27 U/L (ref 0–41)
ANION GAP SERPL CALCULATED.3IONS-SCNC: 12 MEQ/L (ref 7–13)
AST SERPL-CCNC: 22 U/L (ref 0–40)
BASOPHILS ABSOLUTE: 0 K/UL (ref 0–0.2)
BASOPHILS RELATIVE PERCENT: 0.1 %
BILIRUB SERPL-MCNC: 1.1 MG/DL (ref 0–1.2)
BLOOD CULTURE, ROUTINE: NORMAL
BUN BLDV-MCNC: 19 MG/DL (ref 8–23)
C-REACTIVE PROTEIN, HIGH SENSITIVITY: 26.5 MG/L (ref 0–5)
CALCIUM SERPL-MCNC: 8.6 MG/DL (ref 8.6–10.2)
CHLORIDE BLD-SCNC: 100 MEQ/L (ref 98–107)
CO2: 27 MEQ/L (ref 22–29)
CREAT SERPL-MCNC: 1 MG/DL (ref 0.7–1.2)
CULTURE, BLOOD 2: NORMAL
EOSINOPHILS ABSOLUTE: 0.1 K/UL (ref 0–0.7)
EOSINOPHILS RELATIVE PERCENT: 0.9 %
GFR AFRICAN AMERICAN: >60
GFR NON-AFRICAN AMERICAN: >60
GLOBULIN: 2.5 G/DL (ref 2.3–3.5)
GLUCOSE BLD-MCNC: 92 MG/DL (ref 74–109)
HCT VFR BLD CALC: 46.3 % (ref 42–52)
HEMOGLOBIN: 15.6 G/DL (ref 14–18)
INR BLD: 1.9
LYMPHOCYTES ABSOLUTE: 0.9 K/UL (ref 1–4.8)
LYMPHOCYTES RELATIVE PERCENT: 9.5 %
MAGNESIUM: 1.5 MG/DL (ref 1.7–2.3)
MCH RBC QN AUTO: 30.4 PG (ref 27–31.3)
MCHC RBC AUTO-ENTMCNC: 33.6 % (ref 33–37)
MCV RBC AUTO: 90.6 FL (ref 80–100)
MONOCYTES ABSOLUTE: 0.8 K/UL (ref 0.2–0.8)
MONOCYTES RELATIVE PERCENT: 9 %
NEUTROPHILS ABSOLUTE: 7.2 K/UL (ref 1.4–6.5)
NEUTROPHILS RELATIVE PERCENT: 80.5 %
PDW BLD-RTO: 14.8 % (ref 11.5–14.5)
PLATELET # BLD: 159 K/UL (ref 130–400)
POTASSIUM SERPL-SCNC: 4.2 MEQ/L (ref 3.5–5.1)
PROTHROMBIN TIME: 19.7 SEC (ref 9.6–12.3)
RBC # BLD: 5.11 M/UL (ref 4.7–6.1)
SEDIMENTATION RATE, ERYTHROCYTE: 8 MM (ref 0–20)
SODIUM BLD-SCNC: 139 MEQ/L (ref 132–144)
TOTAL PROTEIN: 5.2 G/DL (ref 6.4–8.1)
TROPONIN: <0.01 NG/ML (ref 0–0.01)
WBC # BLD: 9 K/UL (ref 4.8–10.8)

## 2018-06-17 PROCEDURE — 6360000002 HC RX W HCPCS: Performed by: INTERNAL MEDICINE

## 2018-06-17 PROCEDURE — 84484 ASSAY OF TROPONIN QUANT: CPT

## 2018-06-17 PROCEDURE — 2060000000 HC ICU INTERMEDIATE R&B

## 2018-06-17 PROCEDURE — 6370000000 HC RX 637 (ALT 250 FOR IP): Performed by: INTERNAL MEDICINE

## 2018-06-17 PROCEDURE — 85652 RBC SED RATE AUTOMATED: CPT

## 2018-06-17 PROCEDURE — 71046 X-RAY EXAM CHEST 2 VIEWS: CPT

## 2018-06-17 PROCEDURE — 36415 COLL VENOUS BLD VENIPUNCTURE: CPT

## 2018-06-17 PROCEDURE — 2700000000 HC OXYGEN THERAPY PER DAY

## 2018-06-17 PROCEDURE — 83735 ASSAY OF MAGNESIUM: CPT

## 2018-06-17 PROCEDURE — 80162 ASSAY OF DIGOXIN TOTAL: CPT

## 2018-06-17 PROCEDURE — 85610 PROTHROMBIN TIME: CPT

## 2018-06-17 PROCEDURE — 93005 ELECTROCARDIOGRAM TRACING: CPT

## 2018-06-17 PROCEDURE — 85025 COMPLETE CBC W/AUTO DIFF WBC: CPT

## 2018-06-17 PROCEDURE — 86141 C-REACTIVE PROTEIN HS: CPT

## 2018-06-17 PROCEDURE — 80053 COMPREHEN METABOLIC PANEL: CPT

## 2018-06-17 PROCEDURE — 2580000003 HC RX 258: Performed by: INTERNAL MEDICINE

## 2018-06-17 RX ORDER — OXYBUTYNIN CHLORIDE 5 MG/1
2.5 TABLET ORAL 3 TIMES DAILY
Status: CANCELLED | OUTPATIENT
Start: 2018-06-17

## 2018-06-17 RX ORDER — DIGOXIN 125 MCG
125 TABLET ORAL DAILY
Status: CANCELLED | OUTPATIENT
Start: 2018-06-18

## 2018-06-17 RX ORDER — BRIMONIDINE TARTRATE 2 MG/ML
1 SOLUTION/ DROPS OPHTHALMIC 2 TIMES DAILY
Status: CANCELLED | OUTPATIENT
Start: 2018-06-17

## 2018-06-17 RX ORDER — AMIODARONE HYDROCHLORIDE 200 MG/1
400 TABLET ORAL 2 TIMES DAILY
Status: CANCELLED | OUTPATIENT
Start: 2018-06-17

## 2018-06-17 RX ORDER — SODIUM CHLORIDE 0.9 % (FLUSH) 0.9 %
10 SYRINGE (ML) INJECTION PRN
Status: CANCELLED | OUTPATIENT
Start: 2018-06-17

## 2018-06-17 RX ORDER — LATANOPROST 50 UG/ML
1 SOLUTION/ DROPS OPHTHALMIC NIGHTLY
Status: CANCELLED | OUTPATIENT
Start: 2018-06-17

## 2018-06-17 RX ORDER — METOPROLOL TARTRATE 5 MG/5ML
5 INJECTION INTRAVENOUS EVERY 4 HOURS PRN
Status: CANCELLED | OUTPATIENT
Start: 2018-06-17

## 2018-06-17 RX ORDER — FUROSEMIDE 10 MG/ML
60 INJECTION INTRAMUSCULAR; INTRAVENOUS 2 TIMES DAILY
Status: CANCELLED | OUTPATIENT
Start: 2018-06-17

## 2018-06-17 RX ORDER — IPRATROPIUM BROMIDE AND ALBUTEROL SULFATE 2.5; .5 MG/3ML; MG/3ML
1 SOLUTION RESPIRATORY (INHALATION) EVERY 4 HOURS PRN
Status: CANCELLED | OUTPATIENT
Start: 2018-06-17

## 2018-06-17 RX ORDER — ONDANSETRON 2 MG/ML
4 INJECTION INTRAMUSCULAR; INTRAVENOUS EVERY 6 HOURS PRN
Status: CANCELLED | OUTPATIENT
Start: 2018-06-17

## 2018-06-17 RX ORDER — TIMOLOL MALEATE 5 MG/ML
1 SOLUTION/ DROPS OPHTHALMIC 2 TIMES DAILY
Status: CANCELLED | OUTPATIENT
Start: 2018-06-17

## 2018-06-17 RX ORDER — FLUTICASONE PROPIONATE 50 MCG
1 SPRAY, SUSPENSION (ML) NASAL DAILY
Status: CANCELLED | OUTPATIENT
Start: 2018-06-18

## 2018-06-17 RX ORDER — SODIUM CHLORIDE 0.9 % (FLUSH) 0.9 %
10 SYRINGE (ML) INJECTION EVERY 12 HOURS SCHEDULED
Status: CANCELLED | OUTPATIENT
Start: 2018-06-17

## 2018-06-17 RX ADMIN — AMIODARONE HYDROCHLORIDE 400 MG: 200 TABLET ORAL at 20:29

## 2018-06-17 RX ADMIN — OXYBUTYNIN CHLORIDE 2.5 MG: 5 TABLET ORAL at 10:15

## 2018-06-17 RX ADMIN — METOPROLOL TARTRATE 25 MG: 25 TABLET ORAL at 20:29

## 2018-06-17 RX ADMIN — AMIODARONE HYDROCHLORIDE 400 MG: 200 TABLET ORAL at 10:15

## 2018-06-17 RX ADMIN — Medication 10 ML: at 20:30

## 2018-06-17 RX ADMIN — METOPROLOL TARTRATE 25 MG: 25 TABLET ORAL at 10:15

## 2018-06-17 RX ADMIN — APIXABAN 5 MG: 5 TABLET, FILM COATED ORAL at 10:15

## 2018-06-17 RX ADMIN — TIMOLOL MALEATE 1 DROP: 5 SOLUTION OPHTHALMIC at 10:20

## 2018-06-17 RX ADMIN — CEFTRIAXONE SODIUM 1 G: 1 INJECTION, POWDER, FOR SOLUTION INTRAMUSCULAR; INTRAVENOUS at 04:35

## 2018-06-17 RX ADMIN — APIXABAN 5 MG: 5 TABLET, FILM COATED ORAL at 20:29

## 2018-06-17 RX ADMIN — BRIMONIDINE TARTRATE 1 DROP: 2 SOLUTION OPHTHALMIC at 20:30

## 2018-06-17 RX ADMIN — FUROSEMIDE 60 MG: 10 INJECTION, SOLUTION INTRAVENOUS at 10:16

## 2018-06-17 RX ADMIN — BRIMONIDINE TARTRATE 1 DROP: 2 SOLUTION OPHTHALMIC at 10:18

## 2018-06-17 RX ADMIN — TIMOLOL MALEATE 1 DROP: 5 SOLUTION OPHTHALMIC at 20:30

## 2018-06-17 RX ADMIN — OXYBUTYNIN CHLORIDE 2.5 MG: 5 TABLET ORAL at 18:09

## 2018-06-17 RX ADMIN — DIGOXIN 125 MCG: 125 TABLET ORAL at 10:15

## 2018-06-17 RX ADMIN — LATANOPROST 1 DROP: 50 SOLUTION OPHTHALMIC at 20:30

## 2018-06-17 RX ADMIN — FUROSEMIDE 60 MG: 10 INJECTION, SOLUTION INTRAVENOUS at 18:09

## 2018-06-17 RX ADMIN — FLUTICASONE PROPIONATE 1 SPRAY: 50 SPRAY, METERED NASAL at 18:09

## 2018-06-17 RX ADMIN — OXYBUTYNIN CHLORIDE 2.5 MG: 5 TABLET ORAL at 20:29

## 2018-06-17 RX ADMIN — METOPROLOL TARTRATE 25 MG: 25 TABLET ORAL at 18:09

## 2018-06-17 ASSESSMENT — PAIN DESCRIPTION - PROGRESSION
CLINICAL_PROGRESSION: RAPIDLY WORSENING
CLINICAL_PROGRESSION: RAPIDLY WORSENING

## 2018-06-17 ASSESSMENT — PAIN SCALES - GENERAL
PAINLEVEL_OUTOF10: 0

## 2018-06-18 ENCOUNTER — APPOINTMENT (OUTPATIENT)
Dept: CARDIAC CATH/INVASIVE PROCEDURES | Age: 83
DRG: 283 | End: 2018-06-18
Payer: MEDICARE

## 2018-06-18 LAB
ALBUMIN SERPL-MCNC: 2.7 G/DL (ref 3.9–4.9)
ALP BLD-CCNC: 68 U/L (ref 35–104)
ALT SERPL-CCNC: 29 U/L (ref 0–41)
ANION GAP SERPL CALCULATED.3IONS-SCNC: 13 MEQ/L (ref 7–13)
AST SERPL-CCNC: 23 U/L (ref 0–40)
BASOPHILS ABSOLUTE: 0 K/UL (ref 0–0.2)
BASOPHILS RELATIVE PERCENT: 0.4 %
BILIRUB SERPL-MCNC: 1 MG/DL (ref 0–1.2)
BUN BLDV-MCNC: 20 MG/DL (ref 8–23)
CALCIUM SERPL-MCNC: 8.6 MG/DL (ref 8.6–10.2)
CHLORIDE BLD-SCNC: 98 MEQ/L (ref 98–107)
CO2: 30 MEQ/L (ref 22–29)
CREAT SERPL-MCNC: 1.12 MG/DL (ref 0.7–1.2)
DIGOXIN LEVEL: 0.7 NG/ML (ref 0.8–2)
EOSINOPHILS ABSOLUTE: 0.2 K/UL (ref 0–0.7)
EOSINOPHILS RELATIVE PERCENT: 2.6 %
GFR AFRICAN AMERICAN: >60
GFR NON-AFRICAN AMERICAN: >60
GLOBULIN: 2.6 G/DL (ref 2.3–3.5)
GLUCOSE BLD-MCNC: 74 MG/DL (ref 74–109)
HCT VFR BLD CALC: 45.8 % (ref 42–52)
HEMOGLOBIN: 15.6 G/DL (ref 14–18)
LYMPHOCYTES ABSOLUTE: 1.1 K/UL (ref 1–4.8)
LYMPHOCYTES RELATIVE PERCENT: 13.2 %
MAGNESIUM: 1.5 MG/DL (ref 1.7–2.3)
MCH RBC QN AUTO: 30.9 PG (ref 27–31.3)
MCHC RBC AUTO-ENTMCNC: 34 % (ref 33–37)
MCV RBC AUTO: 91 FL (ref 80–100)
MONOCYTES ABSOLUTE: 0.8 K/UL (ref 0.2–0.8)
MONOCYTES RELATIVE PERCENT: 9.9 %
NEUTROPHILS ABSOLUTE: 6.1 K/UL (ref 1.4–6.5)
NEUTROPHILS RELATIVE PERCENT: 73.9 %
PDW BLD-RTO: 15 % (ref 11.5–14.5)
PLATELET # BLD: 184 K/UL (ref 130–400)
POTASSIUM SERPL-SCNC: 3.5 MEQ/L (ref 3.5–5.1)
RBC # BLD: 5.04 M/UL (ref 4.7–6.1)
SODIUM BLD-SCNC: 141 MEQ/L (ref 132–144)
TOTAL PROTEIN: 5.3 G/DL (ref 6.4–8.1)
WBC # BLD: 8.3 K/UL (ref 4.8–10.8)

## 2018-06-18 PROCEDURE — 6360000002 HC RX W HCPCS: Performed by: INTERNAL MEDICINE

## 2018-06-18 PROCEDURE — 93325 DOPPLER ECHO COLOR FLOW MAPG: CPT

## 2018-06-18 PROCEDURE — 93312 ECHO TRANSESOPHAGEAL: CPT

## 2018-06-18 PROCEDURE — B246ZZ4 ULTRASONOGRAPHY OF RIGHT AND LEFT HEART, TRANSESOPHAGEAL: ICD-10-PCS | Performed by: INTERNAL MEDICINE

## 2018-06-18 PROCEDURE — 5A2204Z RESTORATION OF CARDIAC RHYTHM, SINGLE: ICD-10-PCS | Performed by: INTERNAL MEDICINE

## 2018-06-18 PROCEDURE — 93321 DOPPLER ECHO F-UP/LMTD STD: CPT

## 2018-06-18 PROCEDURE — 36415 COLL VENOUS BLD VENIPUNCTURE: CPT

## 2018-06-18 PROCEDURE — 85025 COMPLETE CBC W/AUTO DIFF WBC: CPT

## 2018-06-18 PROCEDURE — 93005 ELECTROCARDIOGRAM TRACING: CPT

## 2018-06-18 PROCEDURE — 6370000000 HC RX 637 (ALT 250 FOR IP): Performed by: INTERNAL MEDICINE

## 2018-06-18 PROCEDURE — 92960 CARDIOVERSION ELECTRIC EXT: CPT | Performed by: INTERNAL MEDICINE

## 2018-06-18 PROCEDURE — 83735 ASSAY OF MAGNESIUM: CPT

## 2018-06-18 PROCEDURE — 2580000003 HC RX 258: Performed by: INTERNAL MEDICINE

## 2018-06-18 PROCEDURE — 80053 COMPREHEN METABOLIC PANEL: CPT

## 2018-06-18 PROCEDURE — 6360000002 HC RX W HCPCS

## 2018-06-18 PROCEDURE — 2060000000 HC ICU INTERMEDIATE R&B

## 2018-06-18 RX ORDER — SODIUM CHLORIDE 0.9 % (FLUSH) 0.9 %
10 SYRINGE (ML) INJECTION PRN
Status: DISCONTINUED | OUTPATIENT
Start: 2018-06-18 | End: 2018-06-20 | Stop reason: HOSPADM

## 2018-06-18 RX ORDER — SODIUM CHLORIDE 0.9 % (FLUSH) 0.9 %
10 SYRINGE (ML) INJECTION EVERY 12 HOURS SCHEDULED
Status: DISCONTINUED | OUTPATIENT
Start: 2018-06-18 | End: 2018-06-20 | Stop reason: HOSPADM

## 2018-06-18 RX ORDER — BACTERIOSTATIC SODIUM CHLORIDE 0.9 %
10 VIAL (ML) INJECTION ONCE
Status: DISCONTINUED | OUTPATIENT
Start: 2018-06-18 | End: 2018-06-20 | Stop reason: HOSPADM

## 2018-06-18 RX ORDER — MIDAZOLAM HYDROCHLORIDE 1 MG/ML
INJECTION INTRAMUSCULAR; INTRAVENOUS
Status: COMPLETED | OUTPATIENT
Start: 2018-06-18 | End: 2018-06-18

## 2018-06-18 RX ORDER — FLUMAZENIL 0.1 MG/ML
INJECTION, SOLUTION INTRAVENOUS
Status: DISCONTINUED
Start: 2018-06-18 | End: 2018-06-19

## 2018-06-18 RX ORDER — FENTANYL CITRATE 50 UG/ML
100 INJECTION, SOLUTION INTRAMUSCULAR; INTRAVENOUS ONCE
Status: DISCONTINUED | OUTPATIENT
Start: 2018-06-18 | End: 2018-06-18

## 2018-06-18 RX ORDER — MIDAZOLAM HYDROCHLORIDE 1 MG/ML
4 INJECTION INTRAMUSCULAR; INTRAVENOUS ONCE
Status: DISCONTINUED | OUTPATIENT
Start: 2018-06-18 | End: 2018-06-18

## 2018-06-18 RX ORDER — METOPROLOL TARTRATE 5 MG/5ML
5 INJECTION INTRAVENOUS ONCE
Status: DISCONTINUED | OUTPATIENT
Start: 2018-06-18 | End: 2018-06-18

## 2018-06-18 RX ORDER — BACTERIOSTATIC SODIUM CHLORIDE 0.9 %
VIAL (ML) INJECTION
Status: DISCONTINUED
Start: 2018-06-18 | End: 2018-06-18

## 2018-06-18 RX ORDER — FLUMAZENIL 0.1 MG/ML
0.2 INJECTION, SOLUTION INTRAVENOUS ONCE
Status: DISCONTINUED | OUTPATIENT
Start: 2018-06-18 | End: 2018-06-18

## 2018-06-18 RX ORDER — METOPROLOL TARTRATE 5 MG/5ML
INJECTION INTRAVENOUS
Status: DISCONTINUED
Start: 2018-06-18 | End: 2018-06-18

## 2018-06-18 RX ORDER — NALOXONE HYDROCHLORIDE 0.4 MG/ML
0.2 INJECTION, SOLUTION INTRAMUSCULAR; INTRAVENOUS; SUBCUTANEOUS ONCE
Status: DISCONTINUED | OUTPATIENT
Start: 2018-06-18 | End: 2018-06-18

## 2018-06-18 RX ORDER — MIDAZOLAM HYDROCHLORIDE 1 MG/ML
INJECTION INTRAMUSCULAR; INTRAVENOUS
Status: DISPENSED
Start: 2018-06-18 | End: 2018-06-18

## 2018-06-18 RX ORDER — NALOXONE HYDROCHLORIDE 0.4 MG/ML
INJECTION, SOLUTION INTRAMUSCULAR; INTRAVENOUS; SUBCUTANEOUS
Status: DISCONTINUED
Start: 2018-06-18 | End: 2018-06-19

## 2018-06-18 RX ORDER — SODIUM CHLORIDE 9 MG/ML
INJECTION, SOLUTION INTRAVENOUS CONTINUOUS
Status: DISCONTINUED | OUTPATIENT
Start: 2018-06-18 | End: 2018-06-18

## 2018-06-18 RX ADMIN — OXYBUTYNIN CHLORIDE 2.5 MG: 5 TABLET ORAL at 16:21

## 2018-06-18 RX ADMIN — METOPROLOL TARTRATE 25 MG: 25 TABLET ORAL at 21:24

## 2018-06-18 RX ADMIN — DIGOXIN 125 MCG: 125 TABLET ORAL at 10:12

## 2018-06-18 RX ADMIN — BRIMONIDINE TARTRATE 1 DROP: 2 SOLUTION OPHTHALMIC at 21:00

## 2018-06-18 RX ADMIN — BRIMONIDINE TARTRATE 1 DROP: 2 SOLUTION OPHTHALMIC at 10:14

## 2018-06-18 RX ADMIN — Medication 10 ML: at 21:25

## 2018-06-18 RX ADMIN — FUROSEMIDE 60 MG: 10 INJECTION, SOLUTION INTRAVENOUS at 19:31

## 2018-06-18 RX ADMIN — AMIODARONE HYDROCHLORIDE 400 MG: 200 TABLET ORAL at 21:24

## 2018-06-18 RX ADMIN — LATANOPROST 1 DROP: 50 SOLUTION OPHTHALMIC at 21:00

## 2018-06-18 RX ADMIN — MIDAZOLAM HYDROCHLORIDE 2 MG: 1 INJECTION, SOLUTION INTRAMUSCULAR; INTRAVENOUS at 11:32

## 2018-06-18 RX ADMIN — OXYBUTYNIN CHLORIDE 2.5 MG: 5 TABLET ORAL at 21:24

## 2018-06-18 RX ADMIN — TIMOLOL MALEATE 1 DROP: 5 SOLUTION OPHTHALMIC at 21:00

## 2018-06-18 RX ADMIN — AMIODARONE HYDROCHLORIDE 400 MG: 200 TABLET ORAL at 10:12

## 2018-06-18 RX ADMIN — TIMOLOL MALEATE 1 DROP: 5 SOLUTION OPHTHALMIC at 10:14

## 2018-06-18 RX ADMIN — FENTANYL CITRATE 50 MCG: 50 INJECTION, SOLUTION INTRAMUSCULAR; INTRAVENOUS at 11:32

## 2018-06-18 RX ADMIN — APIXABAN 5 MG: 5 TABLET, FILM COATED ORAL at 10:12

## 2018-06-18 RX ADMIN — OXYBUTYNIN CHLORIDE 2.5 MG: 5 TABLET ORAL at 10:11

## 2018-06-18 RX ADMIN — FUROSEMIDE 60 MG: 10 INJECTION, SOLUTION INTRAVENOUS at 10:12

## 2018-06-18 RX ADMIN — METOPROLOL TARTRATE 25 MG: 25 TABLET ORAL at 10:11

## 2018-06-18 RX ADMIN — MIDAZOLAM HYDROCHLORIDE 2 MG: 1 INJECTION, SOLUTION INTRAMUSCULAR; INTRAVENOUS at 11:48

## 2018-06-18 RX ADMIN — APIXABAN 5 MG: 5 TABLET, FILM COATED ORAL at 21:24

## 2018-06-18 RX ADMIN — BENZOCAINE 2 EACH: 220 SPRAY, METERED PERIODONTAL at 11:38

## 2018-06-18 ASSESSMENT — PAIN SCALES - GENERAL
PAINLEVEL_OUTOF10: 0
PAINLEVEL_OUTOF10: 0

## 2018-06-19 ENCOUNTER — APPOINTMENT (OUTPATIENT)
Dept: GENERAL RADIOLOGY | Age: 83
DRG: 283 | End: 2018-06-19
Payer: MEDICARE

## 2018-06-19 PROBLEM — I10 ESSENTIAL HYPERTENSION: Status: ACTIVE | Noted: 2018-06-19

## 2018-06-19 PROBLEM — I50.21 ACUTE SYSTOLIC CHF (CONGESTIVE HEART FAILURE), NYHA CLASS 4 (HCC): Status: ACTIVE | Noted: 2018-06-19

## 2018-06-19 LAB
ALBUMIN SERPL-MCNC: 2.6 G/DL (ref 3.9–4.9)
ALP BLD-CCNC: 63 U/L (ref 35–104)
ALT SERPL-CCNC: 25 U/L (ref 0–41)
ANION GAP SERPL CALCULATED.3IONS-SCNC: 13 MEQ/L (ref 7–13)
AST SERPL-CCNC: 21 U/L (ref 0–40)
BASOPHILS ABSOLUTE: 0 K/UL (ref 0–0.2)
BASOPHILS RELATIVE PERCENT: 0.2 %
BILIRUB SERPL-MCNC: 1.1 MG/DL (ref 0–1.2)
BUN BLDV-MCNC: 21 MG/DL (ref 8–23)
CALCIUM SERPL-MCNC: 8.2 MG/DL (ref 8.6–10.2)
CHLORIDE BLD-SCNC: 95 MEQ/L (ref 98–107)
CO2: 30 MEQ/L (ref 22–29)
CREAT SERPL-MCNC: 1.16 MG/DL (ref 0.7–1.2)
EOSINOPHILS ABSOLUTE: 0.3 K/UL (ref 0–0.7)
EOSINOPHILS RELATIVE PERCENT: 3.8 %
GFR AFRICAN AMERICAN: >60
GFR NON-AFRICAN AMERICAN: 59.5
GLOBULIN: 2.3 G/DL (ref 2.3–3.5)
GLUCOSE BLD-MCNC: 68 MG/DL (ref 74–109)
HCT VFR BLD CALC: 42.5 % (ref 42–52)
HEMOGLOBIN: 14.1 G/DL (ref 14–18)
LYMPHOCYTES ABSOLUTE: 0.8 K/UL (ref 1–4.8)
LYMPHOCYTES RELATIVE PERCENT: 10.2 %
MAGNESIUM: 1.3 MG/DL (ref 1.7–2.3)
MCH RBC QN AUTO: 30 PG (ref 27–31.3)
MCHC RBC AUTO-ENTMCNC: 33.1 % (ref 33–37)
MCV RBC AUTO: 90.6 FL (ref 80–100)
MONOCYTES ABSOLUTE: 0.7 K/UL (ref 0.2–0.8)
MONOCYTES RELATIVE PERCENT: 9.8 %
NEUTROPHILS ABSOLUTE: 5.8 K/UL (ref 1.4–6.5)
NEUTROPHILS RELATIVE PERCENT: 76 %
PDW BLD-RTO: 15 % (ref 11.5–14.5)
PLATELET # BLD: 168 K/UL (ref 130–400)
POTASSIUM SERPL-SCNC: 3 MEQ/L (ref 3.5–5.1)
RBC # BLD: 4.69 M/UL (ref 4.7–6.1)
SODIUM BLD-SCNC: 138 MEQ/L (ref 132–144)
TOTAL PROTEIN: 4.9 G/DL (ref 6.4–8.1)
WBC # BLD: 7.6 K/UL (ref 4.8–10.8)

## 2018-06-19 PROCEDURE — 36415 COLL VENOUS BLD VENIPUNCTURE: CPT

## 2018-06-19 PROCEDURE — 6370000000 HC RX 637 (ALT 250 FOR IP): Performed by: INTERNAL MEDICINE

## 2018-06-19 PROCEDURE — 83735 ASSAY OF MAGNESIUM: CPT

## 2018-06-19 PROCEDURE — 2060000000 HC ICU INTERMEDIATE R&B

## 2018-06-19 PROCEDURE — 71046 X-RAY EXAM CHEST 2 VIEWS: CPT

## 2018-06-19 PROCEDURE — 93005 ELECTROCARDIOGRAM TRACING: CPT

## 2018-06-19 PROCEDURE — 2580000003 HC RX 258: Performed by: INTERNAL MEDICINE

## 2018-06-19 PROCEDURE — 80053 COMPREHEN METABOLIC PANEL: CPT

## 2018-06-19 PROCEDURE — 6360000002 HC RX W HCPCS: Performed by: INTERNAL MEDICINE

## 2018-06-19 PROCEDURE — 97116 GAIT TRAINING THERAPY: CPT

## 2018-06-19 PROCEDURE — 85025 COMPLETE CBC W/AUTO DIFF WBC: CPT

## 2018-06-19 PROCEDURE — 97535 SELF CARE MNGMENT TRAINING: CPT

## 2018-06-19 RX ORDER — FUROSEMIDE 20 MG/1
20 TABLET ORAL DAILY
Status: DISCONTINUED | OUTPATIENT
Start: 2018-06-20 | End: 2018-06-20 | Stop reason: HOSPADM

## 2018-06-19 RX ORDER — AMIODARONE HYDROCHLORIDE 200 MG/1
200 TABLET ORAL DAILY
Status: DISCONTINUED | OUTPATIENT
Start: 2018-06-20 | End: 2018-06-20 | Stop reason: HOSPADM

## 2018-06-19 RX ORDER — MAGNESIUM SULFATE IN WATER 40 MG/ML
2 INJECTION, SOLUTION INTRAVENOUS ONCE
Status: COMPLETED | OUTPATIENT
Start: 2018-06-19 | End: 2018-06-19

## 2018-06-19 RX ORDER — POTASSIUM CHLORIDE 1.5 G/1.77G
40 POWDER, FOR SOLUTION ORAL ONCE
Status: COMPLETED | OUTPATIENT
Start: 2018-06-19 | End: 2018-06-19

## 2018-06-19 RX ORDER — METOPROLOL SUCCINATE 25 MG/1
25 TABLET, EXTENDED RELEASE ORAL DAILY
Status: DISCONTINUED | OUTPATIENT
Start: 2018-06-20 | End: 2018-06-20 | Stop reason: HOSPADM

## 2018-06-19 RX ORDER — LOSARTAN POTASSIUM 25 MG/1
25 TABLET ORAL DAILY
Status: DISCONTINUED | OUTPATIENT
Start: 2018-06-19 | End: 2018-06-20 | Stop reason: HOSPADM

## 2018-06-19 RX ADMIN — TIMOLOL MALEATE 1 DROP: 5 SOLUTION OPHTHALMIC at 10:12

## 2018-06-19 RX ADMIN — DIGOXIN 125 MCG: 125 TABLET ORAL at 10:01

## 2018-06-19 RX ADMIN — Medication 10 ML: at 10:01

## 2018-06-19 RX ADMIN — OXYBUTYNIN CHLORIDE 2.5 MG: 5 TABLET ORAL at 21:45

## 2018-06-19 RX ADMIN — LATANOPROST 1 DROP: 50 SOLUTION OPHTHALMIC at 21:46

## 2018-06-19 RX ADMIN — APIXABAN 5 MG: 5 TABLET, FILM COATED ORAL at 10:01

## 2018-06-19 RX ADMIN — FUROSEMIDE 60 MG: 10 INJECTION, SOLUTION INTRAVENOUS at 10:00

## 2018-06-19 RX ADMIN — FLUTICASONE PROPIONATE 1 SPRAY: 50 SPRAY, METERED NASAL at 10:11

## 2018-06-19 RX ADMIN — MAGNESIUM SULFATE IN WATER 2 G: 40 INJECTION, SOLUTION INTRAVENOUS at 10:01

## 2018-06-19 RX ADMIN — POTASSIUM CHLORIDE 40 MEQ: 1.5 POWDER, FOR SOLUTION ORAL at 10:00

## 2018-06-19 RX ADMIN — LOSARTAN POTASSIUM 25 MG: 25 TABLET ORAL at 14:56

## 2018-06-19 RX ADMIN — APIXABAN 5 MG: 5 TABLET, FILM COATED ORAL at 21:45

## 2018-06-19 RX ADMIN — AMIODARONE HYDROCHLORIDE 400 MG: 200 TABLET ORAL at 10:01

## 2018-06-19 RX ADMIN — OXYBUTYNIN CHLORIDE 2.5 MG: 5 TABLET ORAL at 14:56

## 2018-06-19 RX ADMIN — TIMOLOL MALEATE 1 DROP: 5 SOLUTION OPHTHALMIC at 21:45

## 2018-06-19 RX ADMIN — METOPROLOL TARTRATE 25 MG: 25 TABLET ORAL at 10:01

## 2018-06-19 RX ADMIN — BRIMONIDINE TARTRATE 1 DROP: 2 SOLUTION OPHTHALMIC at 21:45

## 2018-06-19 RX ADMIN — OXYBUTYNIN CHLORIDE 2.5 MG: 5 TABLET ORAL at 10:11

## 2018-06-19 RX ADMIN — BRIMONIDINE TARTRATE 1 DROP: 2 SOLUTION OPHTHALMIC at 10:12

## 2018-06-19 ASSESSMENT — PAIN SCALES - GENERAL: PAINLEVEL_OUTOF10: 0

## 2018-06-20 ENCOUNTER — HOSPITAL ENCOUNTER (INPATIENT)
Age: 83
LOS: 10 days | Discharge: HOME | DRG: 177 | End: 2018-06-30
Attending: PHYSICAL MEDICINE & REHABILITATION | Admitting: PHYSICAL MEDICINE & REHABILITATION
Payer: MEDICARE

## 2018-06-20 VITALS
RESPIRATION RATE: 18 BRPM | TEMPERATURE: 98.2 F | HEART RATE: 63 BPM | DIASTOLIC BLOOD PRESSURE: 69 MMHG | BODY MASS INDEX: 28.37 KG/M2 | OXYGEN SATURATION: 98 % | HEIGHT: 67 IN | SYSTOLIC BLOOD PRESSURE: 116 MMHG | WEIGHT: 180.78 LBS

## 2018-06-20 PROBLEM — R53.1 GENERALIZED WEAKNESS: Status: ACTIVE | Noted: 2018-06-20

## 2018-06-20 LAB
ANION GAP SERPL CALCULATED.3IONS-SCNC: 11 MEQ/L (ref 7–13)
BASOPHILS ABSOLUTE: 0 K/UL (ref 0–0.2)
BASOPHILS RELATIVE PERCENT: 0.4 %
BUN BLDV-MCNC: 24 MG/DL (ref 8–23)
CALCIUM SERPL-MCNC: 8.4 MG/DL (ref 8.6–10.2)
CHLORIDE BLD-SCNC: 94 MEQ/L (ref 98–107)
CO2: 32 MEQ/L (ref 22–29)
CREAT SERPL-MCNC: 1.1 MG/DL (ref 0.7–1.2)
EKG ATRIAL RATE: 111 BPM
EKG ATRIAL RATE: 125 BPM
EKG ATRIAL RATE: 136 BPM
EKG ATRIAL RATE: 170 BPM
EKG ATRIAL RATE: 182 BPM
EKG ATRIAL RATE: 312 BPM
EKG ATRIAL RATE: 60 BPM
EKG ATRIAL RATE: 61 BPM
EKG ATRIAL RATE: 63 BPM
EKG ATRIAL RATE: 70 BPM
EKG ATRIAL RATE: 98 BPM
EKG P AXIS: 13 DEGREES
EKG P AXIS: 22 DEGREES
EKG P AXIS: 26 DEGREES
EKG P-R INTERVAL: 166 MS
EKG P-R INTERVAL: 166 MS
EKG P-R INTERVAL: 172 MS
EKG Q-T INTERVAL: 284 MS
EKG Q-T INTERVAL: 302 MS
EKG Q-T INTERVAL: 312 MS
EKG Q-T INTERVAL: 314 MS
EKG Q-T INTERVAL: 324 MS
EKG Q-T INTERVAL: 328 MS
EKG Q-T INTERVAL: 328 MS
EKG Q-T INTERVAL: 334 MS
EKG Q-T INTERVAL: 394 MS
EKG Q-T INTERVAL: 396 MS
EKG Q-T INTERVAL: 426 MS
EKG QRS DURATION: 74 MS
EKG QRS DURATION: 80 MS
EKG QRS DURATION: 82 MS
EKG QRS DURATION: 84 MS
EKG QRS DURATION: 88 MS
EKG QRS DURATION: 88 MS
EKG QRS DURATION: 90 MS
EKG QRS DURATION: 92 MS
EKG QRS DURATION: 94 MS
EKG QTC CALCULATION (BAZETT): 405 MS
EKG QTC CALCULATION (BAZETT): 425 MS
EKG QTC CALCULATION (BAZETT): 426 MS
EKG QTC CALCULATION (BAZETT): 443 MS
EKG QTC CALCULATION (BAZETT): 449 MS
EKG QTC CALCULATION (BAZETT): 459 MS
EKG QTC CALCULATION (BAZETT): 462 MS
EKG QTC CALCULATION (BAZETT): 473 MS
EKG QTC CALCULATION (BAZETT): 477 MS
EKG QTC CALCULATION (BAZETT): 482 MS
EKG QTC CALCULATION (BAZETT): 489 MS
EKG R AXIS: -16 DEGREES
EKG R AXIS: -21 DEGREES
EKG R AXIS: -25 DEGREES
EKG R AXIS: -26 DEGREES
EKG R AXIS: -27 DEGREES
EKG R AXIS: -30 DEGREES
EKG R AXIS: -32 DEGREES
EKG R AXIS: -37 DEGREES
EKG R AXIS: -38 DEGREES
EKG R AXIS: -40 DEGREES
EKG R AXIS: -45 DEGREES
EKG T AXIS: -57 DEGREES
EKG T AXIS: -85 DEGREES
EKG T AXIS: 167 DEGREES
EKG T AXIS: 194 DEGREES
EKG T AXIS: 205 DEGREES
EKG T AXIS: 250 DEGREES
EKG T AXIS: 254 DEGREES
EKG T AXIS: 270 DEGREES
EKG T AXIS: 41 DEGREES
EKG T AXIS: 70 DEGREES
EKG T AXIS: 81 DEGREES
EKG VENTRICULAR RATE: 106 BPM
EKG VENTRICULAR RATE: 125 BPM
EKG VENTRICULAR RATE: 130 BPM
EKG VENTRICULAR RATE: 132 BPM
EKG VENTRICULAR RATE: 133 BPM
EKG VENTRICULAR RATE: 137 BPM
EKG VENTRICULAR RATE: 139 BPM
EKG VENTRICULAR RATE: 157 BPM
EKG VENTRICULAR RATE: 60 BPM
EKG VENTRICULAR RATE: 63 BPM
EKG VENTRICULAR RATE: 70 BPM
EOSINOPHILS ABSOLUTE: 0.4 K/UL (ref 0–0.7)
EOSINOPHILS RELATIVE PERCENT: 5.3 %
GFR AFRICAN AMERICAN: >60
GFR NON-AFRICAN AMERICAN: >60
GLUCOSE BLD-MCNC: 77 MG/DL (ref 74–109)
HCT VFR BLD CALC: 42.3 % (ref 42–52)
HEMOGLOBIN: 14.1 G/DL (ref 14–18)
LYMPHOCYTES ABSOLUTE: 0.7 K/UL (ref 1–4.8)
LYMPHOCYTES RELATIVE PERCENT: 10.3 %
MAGNESIUM: 1.7 MG/DL (ref 1.7–2.3)
MCH RBC QN AUTO: 30.1 PG (ref 27–31.3)
MCHC RBC AUTO-ENTMCNC: 33.2 % (ref 33–37)
MCV RBC AUTO: 90.5 FL (ref 80–100)
MONOCYTES ABSOLUTE: 0.6 K/UL (ref 0.2–0.8)
MONOCYTES RELATIVE PERCENT: 9.6 %
NEUTROPHILS ABSOLUTE: 5 K/UL (ref 1.4–6.5)
NEUTROPHILS RELATIVE PERCENT: 74.4 %
PDW BLD-RTO: 15 % (ref 11.5–14.5)
PLATELET # BLD: 172 K/UL (ref 130–400)
POTASSIUM SERPL-SCNC: 3.4 MEQ/L (ref 3.5–5.1)
RBC # BLD: 4.68 M/UL (ref 4.7–6.1)
SODIUM BLD-SCNC: 137 MEQ/L (ref 132–144)
WBC # BLD: 6.7 K/UL (ref 4.8–10.8)

## 2018-06-20 PROCEDURE — 2500000003 HC RX 250 WO HCPCS: Performed by: PHYSICAL MEDICINE & REHABILITATION

## 2018-06-20 PROCEDURE — 93005 ELECTROCARDIOGRAM TRACING: CPT

## 2018-06-20 PROCEDURE — 6370000000 HC RX 637 (ALT 250 FOR IP): Performed by: INTERNAL MEDICINE

## 2018-06-20 PROCEDURE — 97530 THERAPEUTIC ACTIVITIES: CPT

## 2018-06-20 PROCEDURE — 85025 COMPLETE CBC W/AUTO DIFF WBC: CPT

## 2018-06-20 PROCEDURE — 93010 ELECTROCARDIOGRAM REPORT: CPT | Performed by: INTERNAL MEDICINE

## 2018-06-20 PROCEDURE — 36415 COLL VENOUS BLD VENIPUNCTURE: CPT

## 2018-06-20 PROCEDURE — 1180000000 HC REHAB R&B

## 2018-06-20 PROCEDURE — 2580000003 HC RX 258: Performed by: INTERNAL MEDICINE

## 2018-06-20 PROCEDURE — 94667 MNPJ CHEST WALL 1ST: CPT

## 2018-06-20 PROCEDURE — 80048 BASIC METABOLIC PNL TOTAL CA: CPT

## 2018-06-20 PROCEDURE — 83735 ASSAY OF MAGNESIUM: CPT

## 2018-06-20 PROCEDURE — 94664 DEMO&/EVAL PT USE INHALER: CPT

## 2018-06-20 RX ORDER — AMIODARONE HYDROCHLORIDE 200 MG/1
200 TABLET ORAL DAILY
Status: CANCELLED | OUTPATIENT
Start: 2018-06-21

## 2018-06-20 RX ORDER — OXYBUTYNIN CHLORIDE 5 MG/1
2.5 TABLET ORAL 3 TIMES DAILY
Status: DISCONTINUED | OUTPATIENT
Start: 2018-06-20 | End: 2018-06-30 | Stop reason: HOSPADM

## 2018-06-20 RX ORDER — AMIODARONE HYDROCHLORIDE 200 MG/1
200 TABLET ORAL DAILY
Status: DISCONTINUED | OUTPATIENT
Start: 2018-06-21 | End: 2018-06-30 | Stop reason: HOSPADM

## 2018-06-20 RX ORDER — TIMOLOL MALEATE 5 MG/ML
1 SOLUTION/ DROPS OPHTHALMIC 2 TIMES DAILY
Status: DISCONTINUED | OUTPATIENT
Start: 2018-06-20 | End: 2018-06-30 | Stop reason: HOSPADM

## 2018-06-20 RX ORDER — SODIUM CHLORIDE 0.9 % (FLUSH) 0.9 %
10 SYRINGE (ML) INJECTION PRN
Status: DISCONTINUED | OUTPATIENT
Start: 2018-06-20 | End: 2018-06-24

## 2018-06-20 RX ORDER — METOPROLOL SUCCINATE 25 MG/1
25 TABLET, EXTENDED RELEASE ORAL DAILY
Qty: 30 TABLET | Refills: 3 | Status: ON HOLD | OUTPATIENT
Start: 2018-06-20 | End: 2018-06-29

## 2018-06-20 RX ORDER — LOSARTAN POTASSIUM 25 MG/1
25 TABLET ORAL DAILY
Status: DISCONTINUED | OUTPATIENT
Start: 2018-06-21 | End: 2018-06-30 | Stop reason: HOSPADM

## 2018-06-20 RX ORDER — ACETAMINOPHEN 80 MG
TABLET,CHEWABLE ORAL ONCE
Status: COMPLETED | OUTPATIENT
Start: 2018-06-20 | End: 2018-06-20

## 2018-06-20 RX ORDER — FUROSEMIDE 20 MG/1
20 TABLET ORAL DAILY
Qty: 60 TABLET | Refills: 3 | Status: ON HOLD | OUTPATIENT
Start: 2018-06-20 | End: 2018-06-29

## 2018-06-20 RX ORDER — SODIUM CHLORIDE 0.9 % (FLUSH) 0.9 %
10 SYRINGE (ML) INJECTION EVERY 12 HOURS SCHEDULED
Status: DISCONTINUED | OUTPATIENT
Start: 2018-06-20 | End: 2018-06-24

## 2018-06-20 RX ORDER — FUROSEMIDE 20 MG/1
20 TABLET ORAL DAILY
Status: DISCONTINUED | OUTPATIENT
Start: 2018-06-21 | End: 2018-06-30 | Stop reason: HOSPADM

## 2018-06-20 RX ORDER — FLUTICASONE PROPIONATE 50 MCG
1 SPRAY, SUSPENSION (ML) NASAL DAILY
Status: DISCONTINUED | OUTPATIENT
Start: 2018-06-21 | End: 2018-06-26

## 2018-06-20 RX ORDER — METOPROLOL SUCCINATE 25 MG/1
25 TABLET, EXTENDED RELEASE ORAL DAILY
Status: DISCONTINUED | OUTPATIENT
Start: 2018-06-21 | End: 2018-06-30 | Stop reason: HOSPADM

## 2018-06-20 RX ORDER — LOSARTAN POTASSIUM 25 MG/1
25 TABLET ORAL DAILY
Qty: 30 TABLET | Refills: 3 | Status: ON HOLD | OUTPATIENT
Start: 2018-06-20 | End: 2018-06-29

## 2018-06-20 RX ORDER — AMIODARONE HYDROCHLORIDE 200 MG/1
200 TABLET ORAL DAILY
Qty: 30 TABLET | Refills: 3 | Status: ON HOLD | OUTPATIENT
Start: 2018-06-20 | End: 2018-06-29

## 2018-06-20 RX ORDER — LOSARTAN POTASSIUM 25 MG/1
25 TABLET ORAL DAILY
Status: CANCELLED | OUTPATIENT
Start: 2018-06-21

## 2018-06-20 RX ORDER — BRIMONIDINE TARTRATE 2 MG/ML
1 SOLUTION/ DROPS OPHTHALMIC 2 TIMES DAILY
Status: DISCONTINUED | OUTPATIENT
Start: 2018-06-20 | End: 2018-06-30 | Stop reason: HOSPADM

## 2018-06-20 RX ORDER — ONDANSETRON 2 MG/ML
4 INJECTION INTRAMUSCULAR; INTRAVENOUS EVERY 6 HOURS PRN
Status: DISCONTINUED | OUTPATIENT
Start: 2018-06-20 | End: 2018-06-20

## 2018-06-20 RX ORDER — LATANOPROST 50 UG/ML
1 SOLUTION/ DROPS OPHTHALMIC NIGHTLY
Status: DISCONTINUED | OUTPATIENT
Start: 2018-06-20 | End: 2018-06-30 | Stop reason: HOSPADM

## 2018-06-20 RX ORDER — OXYBUTYNIN CHLORIDE 5 MG/1
2.5 TABLET ORAL 3 TIMES DAILY
Qty: 90 TABLET | Refills: 3 | Status: SHIPPED | OUTPATIENT
Start: 2018-06-20 | End: 2019-04-08

## 2018-06-20 RX ORDER — METOPROLOL SUCCINATE 25 MG/1
25 TABLET, EXTENDED RELEASE ORAL DAILY
Status: CANCELLED | OUTPATIENT
Start: 2018-06-21

## 2018-06-20 RX ORDER — FUROSEMIDE 20 MG/1
20 TABLET ORAL DAILY
Status: CANCELLED | OUTPATIENT
Start: 2018-06-21

## 2018-06-20 RX ORDER — IPRATROPIUM BROMIDE AND ALBUTEROL SULFATE 2.5; .5 MG/3ML; MG/3ML
1 SOLUTION RESPIRATORY (INHALATION) EVERY 4 HOURS PRN
Status: DISCONTINUED | OUTPATIENT
Start: 2018-06-20 | End: 2018-06-30 | Stop reason: HOSPADM

## 2018-06-20 RX ADMIN — BRIMONIDINE TARTRATE 1 DROP: 2 SOLUTION OPHTHALMIC at 10:20

## 2018-06-20 RX ADMIN — AMIODARONE HYDROCHLORIDE 200 MG: 200 TABLET ORAL at 10:20

## 2018-06-20 RX ADMIN — Medication 10 ML: at 10:22

## 2018-06-20 RX ADMIN — METOPROLOL SUCCINATE 25 MG: 25 TABLET, EXTENDED RELEASE ORAL at 10:20

## 2018-06-20 RX ADMIN — Medication: at 22:06

## 2018-06-20 RX ADMIN — LATANOPROST 1 DROP: 50 SOLUTION OPHTHALMIC at 22:10

## 2018-06-20 RX ADMIN — OXYBUTYNIN CHLORIDE 2.5 MG: 5 TABLET ORAL at 22:05

## 2018-06-20 RX ADMIN — FUROSEMIDE 20 MG: 20 TABLET ORAL at 10:20

## 2018-06-20 RX ADMIN — APIXABAN 5 MG: 5 TABLET, FILM COATED ORAL at 22:04

## 2018-06-20 RX ADMIN — OXYBUTYNIN CHLORIDE 2.5 MG: 5 TABLET ORAL at 18:21

## 2018-06-20 RX ADMIN — Medication: at 18:39

## 2018-06-20 RX ADMIN — BRIMONIDINE TARTRATE 1 DROP: 2 SOLUTION OPHTHALMIC at 22:07

## 2018-06-20 RX ADMIN — OXYBUTYNIN CHLORIDE 2.5 MG: 5 TABLET ORAL at 10:20

## 2018-06-20 RX ADMIN — LOSARTAN POTASSIUM 25 MG: 25 TABLET ORAL at 10:20

## 2018-06-20 RX ADMIN — TIMOLOL MALEATE 1 DROP: 5 SOLUTION OPHTHALMIC at 10:22

## 2018-06-20 RX ADMIN — APIXABAN 5 MG: 5 TABLET, FILM COATED ORAL at 10:20

## 2018-06-20 RX ADMIN — TIMOLOL MALEATE 1 DROP: 5 SOLUTION OPHTHALMIC at 22:16

## 2018-06-20 ASSESSMENT — PAIN SCALES - GENERAL
PAINLEVEL_OUTOF10: 0

## 2018-06-20 NOTE — PROGRESS NOTES
Patient admitted to rehab floor from 1W. Patient and wife very unclear why they are on the rehab floor. Was told on prior unit patient would only be staying one to two days on rehab. Patient believes he can do therapy at home or as outpatient basis. Patient and wife refused to sign consent forms at this time, wishes to be discharged in the morning. Message sent to Dr. Marck Wagner whom will see patient in the am. Will update Dr. Ball Quiet on rounds or pass on with next shift. Upon arrival patient was a 2 person pivot into bed. Patient has been incontinent in his brief x2 thus far. Stated he does know when he has to urinate but does not feel like getting out of bed. Patient and wife educated on rehab purposes.

## 2018-06-21 PROBLEM — J18.9 PNEUMONIA: Status: ACTIVE | Noted: 2018-06-21

## 2018-06-21 LAB
ANION GAP SERPL CALCULATED.3IONS-SCNC: 9 MEQ/L (ref 7–13)
BASOPHILS ABSOLUTE: 0 K/UL (ref 0–0.2)
BASOPHILS RELATIVE PERCENT: 0.3 %
BILIRUBIN URINE: NEGATIVE
BLOOD, URINE: NEGATIVE
BUN BLDV-MCNC: 20 MG/DL (ref 8–23)
CALCIUM SERPL-MCNC: 8.2 MG/DL (ref 8.6–10.2)
CHLORIDE BLD-SCNC: 94 MEQ/L (ref 98–107)
CLARITY: CLEAR
CO2: 35 MEQ/L (ref 22–29)
COLOR: YELLOW
CREAT SERPL-MCNC: 1.01 MG/DL (ref 0.7–1.2)
EOSINOPHILS ABSOLUTE: 0.3 K/UL (ref 0–0.7)
EOSINOPHILS RELATIVE PERCENT: 5.5 %
GFR AFRICAN AMERICAN: >60
GFR NON-AFRICAN AMERICAN: >60
GLUCOSE BLD-MCNC: 76 MG/DL (ref 74–109)
GLUCOSE URINE: NEGATIVE MG/DL
HCT VFR BLD CALC: 40 % (ref 42–52)
HEMOGLOBIN: 13.3 G/DL (ref 14–18)
KETONES, URINE: NEGATIVE MG/DL
LEUKOCYTE ESTERASE, URINE: NEGATIVE
LYMPHOCYTES ABSOLUTE: 0.7 K/UL (ref 1–4.8)
LYMPHOCYTES RELATIVE PERCENT: 11 %
MCH RBC QN AUTO: 30 PG (ref 27–31.3)
MCHC RBC AUTO-ENTMCNC: 33.3 % (ref 33–37)
MCV RBC AUTO: 90.1 FL (ref 80–100)
MONOCYTES ABSOLUTE: 0.5 K/UL (ref 0.2–0.8)
MONOCYTES RELATIVE PERCENT: 8.8 %
NEUTROPHILS ABSOLUTE: 4.4 K/UL (ref 1.4–6.5)
NEUTROPHILS RELATIVE PERCENT: 74.4 %
NITRITE, URINE: NEGATIVE
PDW BLD-RTO: 14.8 % (ref 11.5–14.5)
PH UA: 6.5 (ref 5–9)
PLATELET # BLD: 184 K/UL (ref 130–400)
POTASSIUM SERPL-SCNC: 3.2 MEQ/L (ref 3.5–5.1)
PROTEIN UA: NEGATIVE MG/DL
RBC # BLD: 4.44 M/UL (ref 4.7–6.1)
SODIUM BLD-SCNC: 138 MEQ/L (ref 132–144)
SPECIFIC GRAVITY UA: 1.01 (ref 1–1.03)
UROBILINOGEN, URINE: 1 E.U./DL
WBC # BLD: 6 K/UL (ref 4.8–10.8)

## 2018-06-21 PROCEDURE — 97535 SELF CARE MNGMENT TRAINING: CPT

## 2018-06-21 PROCEDURE — 80048 BASIC METABOLIC PNL TOTAL CA: CPT

## 2018-06-21 PROCEDURE — 97110 THERAPEUTIC EXERCISES: CPT

## 2018-06-21 PROCEDURE — 81003 URINALYSIS AUTO W/O SCOPE: CPT

## 2018-06-21 PROCEDURE — 92610 EVALUATE SWALLOWING FUNCTION: CPT

## 2018-06-21 PROCEDURE — 87086 URINE CULTURE/COLONY COUNT: CPT

## 2018-06-21 PROCEDURE — 97116 GAIT TRAINING THERAPY: CPT

## 2018-06-21 PROCEDURE — 97162 PT EVAL MOD COMPLEX 30 MIN: CPT

## 2018-06-21 PROCEDURE — 1180000000 HC REHAB R&B

## 2018-06-21 PROCEDURE — 97530 THERAPEUTIC ACTIVITIES: CPT

## 2018-06-21 PROCEDURE — 97166 OT EVAL MOD COMPLEX 45 MIN: CPT

## 2018-06-21 PROCEDURE — 85025 COMPLETE CBC W/AUTO DIFF WBC: CPT

## 2018-06-21 PROCEDURE — 36415 COLL VENOUS BLD VENIPUNCTURE: CPT

## 2018-06-21 PROCEDURE — 6370000000 HC RX 637 (ALT 250 FOR IP): Performed by: INTERNAL MEDICINE

## 2018-06-21 PROCEDURE — 6370000000 HC RX 637 (ALT 250 FOR IP): Performed by: PHYSICAL MEDICINE & REHABILITATION

## 2018-06-21 PROCEDURE — 96125 COGNITIVE TEST BY HC PRO: CPT

## 2018-06-21 RX ORDER — POTASSIUM CHLORIDE 20 MEQ/1
20 TABLET, EXTENDED RELEASE ORAL 2 TIMES DAILY WITH MEALS
Status: DISCONTINUED | OUTPATIENT
Start: 2018-06-21 | End: 2018-06-22

## 2018-06-21 RX ADMIN — BRIMONIDINE TARTRATE 1 DROP: 2 SOLUTION OPHTHALMIC at 22:29

## 2018-06-21 RX ADMIN — Medication: at 14:45

## 2018-06-21 RX ADMIN — FLUTICASONE PROPIONATE 1 SPRAY: 50 SPRAY, METERED NASAL at 09:23

## 2018-06-21 RX ADMIN — LATANOPROST 1 DROP: 50 SOLUTION OPHTHALMIC at 22:29

## 2018-06-21 RX ADMIN — Medication: at 09:30

## 2018-06-21 RX ADMIN — AMIODARONE HYDROCHLORIDE 200 MG: 200 TABLET ORAL at 09:23

## 2018-06-21 RX ADMIN — OXYBUTYNIN CHLORIDE 2.5 MG: 5 TABLET ORAL at 14:44

## 2018-06-21 RX ADMIN — Medication: at 22:30

## 2018-06-21 RX ADMIN — APIXABAN 5 MG: 5 TABLET, FILM COATED ORAL at 22:28

## 2018-06-21 RX ADMIN — OXYBUTYNIN CHLORIDE 2.5 MG: 5 TABLET ORAL at 22:28

## 2018-06-21 RX ADMIN — LOSARTAN POTASSIUM 25 MG: 25 TABLET ORAL at 09:23

## 2018-06-21 RX ADMIN — TIMOLOL MALEATE 1 DROP: 5 SOLUTION OPHTHALMIC at 22:29

## 2018-06-21 RX ADMIN — TIMOLOL MALEATE 1 DROP: 5 SOLUTION OPHTHALMIC at 09:23

## 2018-06-21 RX ADMIN — APIXABAN 5 MG: 5 TABLET, FILM COATED ORAL at 09:23

## 2018-06-21 RX ADMIN — OXYBUTYNIN CHLORIDE 2.5 MG: 5 TABLET ORAL at 09:23

## 2018-06-21 RX ADMIN — BRIMONIDINE TARTRATE 1 DROP: 2 SOLUTION OPHTHALMIC at 09:23

## 2018-06-21 RX ADMIN — FUROSEMIDE 20 MG: 20 TABLET ORAL at 09:23

## 2018-06-21 RX ADMIN — POTASSIUM CHLORIDE 20 MEQ: 20 TABLET, EXTENDED RELEASE ORAL at 22:28

## 2018-06-21 RX ADMIN — METOPROLOL SUCCINATE 25 MG: 25 TABLET, EXTENDED RELEASE ORAL at 09:23

## 2018-06-21 ASSESSMENT — PAIN SCALES - GENERAL
PAINLEVEL_OUTOF10: 0

## 2018-06-21 NOTE — PROGRESS NOTES
pt refusal)  Individuals consulted  Consulted and agree with results and recommendations: Patient; Family member  Family member consulted: wife    Education:  Patient Education: rationale for cog tx  Patient Education Response: Needs reinforcement    Pain:  Pain Assessment  Patient Currently in Pain: No  Pain Assessment:  (denies)  Pain Level: 0    Comprehension          Expression   []7 - Independent   []7 - Indpendent   []6 - Modified Independent  [x]6 - Modified Independent   [x]5 - Supervision   [x]5 - Supervision   [x]4 - Min Assist   []4 - Min Assist   []3 - Mod Assist   []3 - Mod Assist   []2 - Max Assist   []2 - Max Assist   []1 - Dependent   []1 - Dependent    Problem Solving        Memory   []7 - Independent   []7 - Independent   []6 - Modified Independent  []6 - Modified Independent   []5 - Supervision   []5 - Supervision   [x]4 - Min Assist   [x]4 - Min Assist   []3 - Mod Assist   []3 - Mod Assist   []2 - Max Assist   []2 - Max Assist   []1 - Dependent   [] 1 -Dependent                  Therapy Time:   Individual Concurrent Group Co-treatment   Time In 1400         Time Out 1430         Minutes [address] [address], [facility], Date: 6/21/2018, Time: 2:57 PM

## 2018-06-21 NOTE — PROGRESS NOTES
Physical Therapy Rehab Treatment Note  [facility]Department: [facility]  Room: X067/N263-66       NAME: Angela Kimble  : 3/7/1931 (80 y.o.)  MRN: [id number]  CODE STATUS: Full Code    Date of Service: 2018  Chart Reviewed: Yes  Patient assessed for rehabilitation services?: Yes  Family / Caregiver Present: Yes (Pt's wife present.  )  Diagnosis: Pneumonia and altered cardiopulonary with generalized weakness    Restrictions:  Restrictions/Precautions: Fall Risk    SUBJECTIVE: Subjective: Pt states he is ready to go home. Pt's wife states pt was indep and did everything for himself at home. Pain Screening  Patient Currently in Pain: No     Post Treatment Pain Screenin/10     OBJECTIVE: Educated pt on PT goals. Orientation Level: Oriented to situation;Oriented to person (noted confusion at times throughout assessment and word finding difficulties at times)  Follows Commands: Within Functional Limits          Transfers  Sit to Stand: Contact guard assistance  Stand to sit: Contact guard assistance  Comment: VCs for safety and technique. Increased time and effort. Ambulation  Ambulation?: Yes  Ambulation 1  Surface: carpet  Device: Rolling Walker  Other Apparatus: Wheelchair follow  Assistance: Contact guard assistance  Quality of Gait: Shortened and shuffling steps and decreased posture. VCs to correct. Distance: 50ft         Exercises  Hip Flexion: seated marching x15   squats x10     ASSESSMENT/COMMENTS:  Assessment: Increased gait distance. Pt fatigued by end of session. Encouragement needed to complete session. PLAN OF CARE/Safety:   Safety Devices  Type of devices:  All fall risk precautions in place    Therapy Time:   Individual   Time In 1330   Time Out 1400   Minutes 30     Minutes:      Transfer/Bed mobility training:10      Gait training:10      Neuro re education:     Therapeutic ex:Orion Vora PTA, 18 at 2:02 PM

## 2018-06-21 NOTE — PROGRESS NOTES
time of BSE     Indicators of Pharyngeal Phase Dysfunction   Pharyngeal Phase  Pharyngeal Phase: Exceptions  Indicators of Pharyngeal Phase Dysfunction  Throat Clearing - Delayed: Thin  Pharyngeal Phase   Pharyngeal: Pt p/w suspected mild pharyngeal stage dysphagia characterized by delayed throat clearing following consumption of thin liquids. Pt reported he always clears his throat d/t post nasal drip, and he refused trials of nectar. Pt reported no interest in dysphagia therapy and verbalized he did not want to participate in meal monitoring to assess tolerance of diet. Impression  Dysphagia Diagnosis: Mild pharyngeal stage dysphagia  Dysphagia Outcome Severity Scale: Level 5: Mild dysphagia- Distant supervision. May need one diet consistency restricted     Treatment Plan  Requires SLP Intervention: No (pt refusal)  Duration/Frequency of Treatment: NA pt refused            Treatment/Goals  Short-term Goals  Goal 1: NA d/t pt refusal  Long-term Goals  Goal 1: NA d/t pt refusal      Prognosis  Prognosis  Prognosis for safe diet advancement:  (NA pt refused therapy)  Barriers to reach goals: behavior  Individuals consulted  Consulted and agree with results and recommendations: Patient; Family member  Family member consulted: wife    Education  Patient Education: rationale for BSE  Patient Education Response: Needs reinforcement      [x]  RN Tamir Early notified and SLP recommended that nursing keep an eye on pt's tolerance of thin liquids, as he refused ST intervention and meal monitoring        Pain:  Pain Assessment  Patient Currently in Pain: No  Pain Assessment:  (denies)  Pain Level: 0       Therapy Time  SLP Individual Minutes  Time In: 1400  Time Out: [address]  Minutes: [address], [facility], [place]-SLP, Date: 6/21/2018, Time: 2:48 PM

## 2018-06-21 NOTE — PROGRESS NOTES
Occupational Therapy   Occupational Therapy Initial Assessment  Date: 2018   Patient Name: Keesha Arenas  MRN: [id number]     : 3/7/1931    Date of Service: 2018    Discharge Recommendations:  Continue to assess pending progress         Patient Diagnosis(es): There were no encounter diagnoses. has a past medical history of Atrial fibrillation ([address] BPH (benign prostatic hypertrophy); Fistula; Glaucoma; and Hypertension. has a past surgical history that includes AV fistula repair and Cholecystectomy. Restrictions  Restrictions/Precautions  Restrictions/Precautions: Fall Risk  Required Braces or Orthoses?: No    Subjective \"I am weaker than I used to be. \"  General  Chart Reviewed: Yes  Patient assessed for rehabilitation services?: Yes  Family / Caregiver Present: No  Referring Practitioner: Dr. Reyes Bond  Diagnosis: Pneumonia and alterned cardiopulmonary with generalized weakness   Pain Assessment  Patient Currently in Pain: Denies  Pain Assessment: 0-10  Pain Level: 0     Social/Functional History  Social/Functional History  Lives With: Spouse  Type of Home: House  Home Layout: One level  Home Access: Stairs to enter with rails  Entrance Stairs - Number of Steps: 4  Entrance Stairs - Rails: Both  Bathroom Shower/Tub: Walk-in shower  Bathroom Equipment: Grab bars in shower  Bathroom Accessibility: Walker accessible  Home Equipment: Rolling walker, Cane, Grab bars  Receives Help From: Family  ADL Assistance: Independent (Pt reported he is independent in all ADLs with exception of donning B socks and shoes, which wife assists with.)  Homemaking Assistance: Needs assistance (Per pt report, pt's spouse completes majority of housekeeping, meal prep, and laundry.)  Ambulation Assistance: Independent (with single point cane)  Transfer Assistance: Independent (with single point cane)  Active : Yes  Mode of Transportation: Car  Occupation: Retired  Type of occupation: [employer]  Leisure & Hobbies: golfing, fishing        Objective   Vision: Impaired  Vision Exceptions: Wears glasses at all times  Hearing: Exceptions to [facility]  Hearing Exceptions: Hard of hearing/hearing concerns; No hearing aid    Orientation  Overall Orientation Status: Impaired  Orientation Level: Oriented to place;Oriented to time;Oriented to person  Observation/Palpation  Posture: Fair  Observation: no acute distress noted  Standing Balance  Sit to stand: Moderate assistance (from w/c height)  Stand to sit: Minimal assistance  Functional Mobility  Functional - Mobility Device: Standard Walker  Activity: To/from bathroom  Assist Level: Minimal assistance  Toilet Transfers  Toilet - Technique: Ambulating  Equipment Used: Grab bars (with walker)  Toilet Transfer: Minimal assistance  Toilet Transfers Comments: Pt required verbal cues for correct FWW placement for safety. Tub Transfers  Tub Transfers: Not tested  Shower Transfers  Shower - Transfer Type: To and From  Shower - Transfer To: Shower seat with back  Shower - Technique: Ambulating  Shower Transfers:  (Pt required A from sitting > standing from shower chair.)  Shower Transfers Comments: Pt required verbal cues for correct FWW placement for safety. ADL  Feeding: Setup (Pt has partial upper and lower dentures. Pt required A to open containers. Pt able to complete self feeding with use of standard utensils.)  Grooming: Setup (Pt required A to open toothpaste cap. Pt required increased time to clean dentures, brush teeth, and comb hair seated in w/c. Pt verbalized inability to stand during grooming tasks d/t significant fatigue from bathing/dressing.)  UE Bathing: Minimal assistance (Pt required A to rinse B shoulders. Pt washed seated on shower chair. Pt stands to bath at baseline. Pt was unable to safely stand to shower upon evaluation d/t significant fatigue and decreased standing balance.  In standing, pt would have required max A)  LE Bathing: Maximum Assessment   Performance deficits / Impairments: Decreased safe awareness;Decreased functional mobility ; Decreased ADL status; Decreased strength;Decreased endurance;Decreased cognition;Decreased balance;Decreased high-level IADLs  Assessment: Pt is an 80year old male admitted to [facility] d/t pneumonia and alterned cardiopulmonary with generalized weakness. Upon evaluation, pt presents with decreased strength, activity tolerance, static and dynamic standing balance which impacts his safety and independence with functional mobility/transfers and ADLs. Pt would benefit from skilled OT services to address noted impairments and improve overall safety, independence, and quality of life prior to discharge to least restrictive environment. Prognosis: Good  Decision Making: Medium Complexity  Exam: 8 performance deficits   REQUIRES OT FOLLOW UP: Yes  Activity Tolerance  Activity Tolerance: Patient limited by fatigue  Safety Devices  Safety Devices in place: Yes  Type of devices: All fall risk precautions in place         Plan   Plan  Times per week:  minutes per day, 5-7 days per week  Plan weeks: 8-12 days   Current Treatment Recommendations: Strengthening, ROM, Balance Training, Endurance Training, Functional Mobility Training, Neuromuscular Re-education, Safety Education & Training, Equipment Evaluation, Education, & procurement, Patient/Caregiver Education & Training, Home Management Training, Self-Care / ADL      Goals  Patient Goals   Patient goals : \"I want to do things myself. \" \"I used to shower standing. \"      [x]  Patient will complete self care as followed using the recommended adaptive equipment and/or adaptive techniques as instructed:  Feeding: Modified Independent  Grooming: Modified Independent  Bathing: Supervision  UE Dressing: Modified Independent  LE Dressing: Minimal Assistance   Toileting: Modified Independent  Toilet Transfers: Modified Independent  Shower Transfers: Supervision    [x]

## 2018-06-21 NOTE — PROGRESS NOTES
Equipment Evaluation, Education, & procurement, Patient/Caregiver Education & Training, Home Management Training, Self-Care / ADL  G-Code     OutComes Score                                           AM-PAC Score             Goals  Patient Goals   Patient goals : \"I want to do things myself. \" \"I used to shower standing. \"        Therapy Time   Individual Concurrent Group Co-treatment   Time In 1300         Time Out 1330         Minutes [address] [place] [address] [facility] Electronically signed by THERESA Chung on 6/21/2018 at 5:04 PM

## 2018-06-21 NOTE — PROGRESS NOTES
reach out for support with UEs - cues for posture   Distance: 15ft  Stairs/Curb  Stairs?: No (safety concerns)    Activity Tolerance  Activity Tolerance: Patient limited by fatigue        Car transfers: Not tested  Walk 10 ft: Moderate Assist  Walk 50 ft with 2 turns: Not tested  Walk 150 ft in corridor: Not tested  Walking 10 ft on unlevel surface: Not tested  Picking up objects: Not tested  Wheelchair Mobility: No     ASSESSMENT:  Body structures, Functions, Activity limitations: Decreased functional mobility ; Decreased safe awareness;Decreased endurance;Decreased strength;Decreased balance;Decreased ADL status; Decreased ROM  Decision Making: Medium Complexity  History: High  Exam: Med  Clinical Presentation: Med    Prognosis: Good  Patient Education: PT POC; DC recs; role of PT in the hosp  Barriers to Learning: mild confusion    CLINICAL IMPRESSION: Continued PT indicated in order to address functional deficits, improve strength, promote safe mobility and facilitate DC at highest level of indep and safety. PLAN OF CARE:  Frequency: 1-2 treatment sessions per day, 5-7 days per week     Current Treatment Recommendations: Strengthening, Functional Mobility Training, Neuromuscular Re-education, Home Exercise Program, Equipment Evaluation, Education, & procurement, Safety Education & Training, Gait Training, Transfer Training, Balance Training, Endurance Training, Stair training, Patient/Caregiver Education & Training, ADL/Self-care Training, ROM, Cognitive/Perceptual Training    Patient's Goal:  \"Get up and down from a chair easier. \"    GOALS:  Short term goals  Short term goal 1: Pt to complete HEP with indep  Short term goal 2: Pt to complete modified STS when appropriate  Long term goals  Long term goal 1: Pt to complete Bed mobility with indep  Long term goal 2: Pt to complete transfers with indep  Long term goal 3: Pt to ambulate 50ft with LRD and indep  Long term goal 4: Pt to manage 4 steps with B HR and indep    ELOS:   Plan weeks: 2    Therapy Time:    Individual   Time In 1030   Time Out 1100   Minutes [address] [place], [place], 06/21/18 at 12:53 PM

## 2018-06-22 ENCOUNTER — TELEPHONE (OUTPATIENT)
Dept: PHARMACY | Age: 83
End: 2018-06-22

## 2018-06-22 DIAGNOSIS — I48.91 ATRIAL FIBRILLATION, UNSPECIFIED TYPE (HCC): ICD-10-CM

## 2018-06-22 LAB
ANION GAP SERPL CALCULATED.3IONS-SCNC: 9 MEQ/L (ref 7–13)
BASOPHILS ABSOLUTE: 0 K/UL (ref 0–0.2)
BASOPHILS RELATIVE PERCENT: 0.4 %
BUN BLDV-MCNC: 18 MG/DL (ref 8–23)
CALCIUM SERPL-MCNC: 8.6 MG/DL (ref 8.6–10.2)
CHLORIDE BLD-SCNC: 94 MEQ/L (ref 98–107)
CO2: 35 MEQ/L (ref 22–29)
CREAT SERPL-MCNC: 1.02 MG/DL (ref 0.7–1.2)
EOSINOPHILS ABSOLUTE: 0.4 K/UL (ref 0–0.7)
EOSINOPHILS RELATIVE PERCENT: 6.8 %
GFR AFRICAN AMERICAN: >60
GFR NON-AFRICAN AMERICAN: >60
GLUCOSE BLD-MCNC: 77 MG/DL (ref 74–109)
HCT VFR BLD CALC: 38.7 % (ref 42–52)
HEMOGLOBIN: 13.2 G/DL (ref 14–18)
LYMPHOCYTES ABSOLUTE: 0.8 K/UL (ref 1–4.8)
LYMPHOCYTES RELATIVE PERCENT: 13 %
MCH RBC QN AUTO: 30.4 PG (ref 27–31.3)
MCHC RBC AUTO-ENTMCNC: 34.1 % (ref 33–37)
MCV RBC AUTO: 89.3 FL (ref 80–100)
MONOCYTES ABSOLUTE: 0.6 K/UL (ref 0.2–0.8)
MONOCYTES RELATIVE PERCENT: 9.5 %
NEUTROPHILS ABSOLUTE: 4.5 K/UL (ref 1.4–6.5)
NEUTROPHILS RELATIVE PERCENT: 70.3 %
PDW BLD-RTO: 14.6 % (ref 11.5–14.5)
PLATELET # BLD: 193 K/UL (ref 130–400)
POTASSIUM SERPL-SCNC: 3.3 MEQ/L (ref 3.5–5.1)
PRO-BNP: 1554 PG/ML
RBC # BLD: 4.33 M/UL (ref 4.7–6.1)
SODIUM BLD-SCNC: 138 MEQ/L (ref 132–144)
WBC # BLD: 6.4 K/UL (ref 4.8–10.8)

## 2018-06-22 PROCEDURE — 97110 THERAPEUTIC EXERCISES: CPT

## 2018-06-22 PROCEDURE — 6370000000 HC RX 637 (ALT 250 FOR IP): Performed by: PHYSICAL MEDICINE & REHABILITATION

## 2018-06-22 PROCEDURE — 97116 GAIT TRAINING THERAPY: CPT

## 2018-06-22 PROCEDURE — 36415 COLL VENOUS BLD VENIPUNCTURE: CPT

## 2018-06-22 PROCEDURE — 97127 HC OT THER IVNTJ W/FOCUS COG FUNCJ: CPT

## 2018-06-22 PROCEDURE — 6370000000 HC RX 637 (ALT 250 FOR IP): Performed by: INTERNAL MEDICINE

## 2018-06-22 PROCEDURE — 85025 COMPLETE CBC W/AUTO DIFF WBC: CPT

## 2018-06-22 PROCEDURE — 97112 NEUROMUSCULAR REEDUCATION: CPT

## 2018-06-22 PROCEDURE — 97530 THERAPEUTIC ACTIVITIES: CPT

## 2018-06-22 PROCEDURE — 97535 SELF CARE MNGMENT TRAINING: CPT

## 2018-06-22 PROCEDURE — 1180000000 HC REHAB R&B

## 2018-06-22 PROCEDURE — 83880 ASSAY OF NATRIURETIC PEPTIDE: CPT

## 2018-06-22 PROCEDURE — 80048 BASIC METABOLIC PNL TOTAL CA: CPT

## 2018-06-22 RX ORDER — SPIRONOLACTONE 25 MG/1
12.5 TABLET ORAL DAILY
Status: DISCONTINUED | OUTPATIENT
Start: 2018-06-22 | End: 2018-06-30 | Stop reason: HOSPADM

## 2018-06-22 RX ORDER — ACETAMINOPHEN 80 MG
TABLET,CHEWABLE ORAL ONCE
Status: COMPLETED | OUTPATIENT
Start: 2018-06-22 | End: 2018-06-22

## 2018-06-22 RX ORDER — POTASSIUM CHLORIDE 20 MEQ/1
20 TABLET, EXTENDED RELEASE ORAL
Status: DISCONTINUED | OUTPATIENT
Start: 2018-06-23 | End: 2018-06-29

## 2018-06-22 RX ADMIN — AMIODARONE HYDROCHLORIDE 200 MG: 200 TABLET ORAL at 07:32

## 2018-06-22 RX ADMIN — METOPROLOL SUCCINATE 25 MG: 25 TABLET, EXTENDED RELEASE ORAL at 07:32

## 2018-06-22 RX ADMIN — POTASSIUM CHLORIDE 20 MEQ: 20 TABLET, EXTENDED RELEASE ORAL at 07:33

## 2018-06-22 RX ADMIN — LOSARTAN POTASSIUM 25 MG: 25 TABLET ORAL at 07:32

## 2018-06-22 RX ADMIN — APIXABAN 5 MG: 5 TABLET, FILM COATED ORAL at 19:51

## 2018-06-22 RX ADMIN — LATANOPROST 1 DROP: 50 SOLUTION OPHTHALMIC at 19:52

## 2018-06-22 RX ADMIN — POTASSIUM CHLORIDE 20 MEQ: 20 TABLET, EXTENDED RELEASE ORAL at 15:59

## 2018-06-22 RX ADMIN — TIMOLOL MALEATE 1 DROP: 5 SOLUTION OPHTHALMIC at 19:52

## 2018-06-22 RX ADMIN — Medication: at 19:52

## 2018-06-22 RX ADMIN — APIXABAN 5 MG: 5 TABLET, FILM COATED ORAL at 07:32

## 2018-06-22 RX ADMIN — BRIMONIDINE TARTRATE 1 DROP: 2 SOLUTION OPHTHALMIC at 07:31

## 2018-06-22 RX ADMIN — OXYBUTYNIN CHLORIDE 2.5 MG: 5 TABLET ORAL at 14:04

## 2018-06-22 RX ADMIN — FUROSEMIDE 20 MG: 20 TABLET ORAL at 07:32

## 2018-06-22 RX ADMIN — TIMOLOL MALEATE 1 DROP: 5 SOLUTION OPHTHALMIC at 07:36

## 2018-06-22 RX ADMIN — OXYBUTYNIN CHLORIDE 2.5 MG: 5 TABLET ORAL at 07:32

## 2018-06-22 RX ADMIN — OXYBUTYNIN CHLORIDE 2.5 MG: 5 TABLET ORAL at 19:51

## 2018-06-22 RX ADMIN — BRIMONIDINE TARTRATE 1 DROP: 2 SOLUTION OPHTHALMIC at 19:51

## 2018-06-22 RX ADMIN — Medication: at 19:56

## 2018-06-22 ASSESSMENT — PAIN SCALES - GENERAL
PAINLEVEL_OUTOF10: 0

## 2018-06-22 NOTE — PROGRESS NOTES
Occupational Therapy  Facility/Department: [facility]  Daily Treatment Note  NAME: Mable Harrison  : 3/7/1931  MRN: [id number]    Date of Service: 2018    Discharge Recommendations:  Continue to assess pending progress       Patient Diagnosis(es): There were no encounter diagnoses. has a past medical history of Atrial fibrillation ([address] BPH (benign prostatic hypertrophy); Fistula; Glaucoma; and Hypertension. has a past surgical history that includes AV fistula repair and Cholecystectomy. Restrictions  Restrictions/Precautions  Restrictions/Precautions: Fall Risk  Required Braces or Orthoses?: No  Subjective   General  Chart Reviewed: Yes  Patient assessed for rehabilitation services?: Yes  Family / Caregiver Present: No  Referring Practitioner: Dr. Renny Alpers  Diagnosis: Pneumonia and alterned cardiopulmonary with generalized weakness   Pre Treatment Pain Screening  Pain at present: 0  Scale Used: Numeric Score  Intervention List: Patient able to continue with treatment  Pain Assessment  Patient Currently in Pain: No  Pain Assessment: 0-10  Pain Level: 0  Vital Signs  Patient Currently in Pain: No   Orientation     Objective    ADL  Grooming: Setup   Pt refused to perform a shower due to saying that he does not shower everyday at home. Pt transferred from bed to w/c with Min A. He worked on Grooming at sink while standing with walker (brushing his dentures and taking them out and rinsing them and putting them back in and washed his face and neck. Pt required a rest break between each activity. He then combed his hair and cleaned his glasses from a seated position. Pt leaning against the wall at times for support. Pt worked on [facility] pants. He required Min A to start 2nd foot in pant hole. Pt required Min A for sit to stand transfers. Pt SOB at times during OT session.                                                                                 Assessment      Activity Tolerance  Activity Tolerance: Patient limited by fatigue          Plan   Plan  Times per week:  minutes per day, 5-7 days per week  Plan weeks: 8-12 days   Current Treatment Recommendations: Strengthening, ROM, Balance Training, Endurance Training, Functional Mobility Training, Neuromuscular Re-education, Safety Education & Training, Equipment Evaluation, Education, & procurement, Patient/Caregiver Education & Training, Home Management Training, Self-Care / ADL  G-Code     OutComes Score                                           AM-PAC Score             Goals  Patient Goals   Patient goals : \"I want to do things myself. \" \"I used to shower standing. \"        Therapy Time   Individual Concurrent Group Co-treatment   Time In 0830         Time Out 0935         Minutes THERESA James Electronically signed by THERESA Motnoya on 6/22/2018 at 1:14 PM

## 2018-06-22 NOTE — CONSULTS
Cardiology Progress/Consult Note      Patient:  Janessa Presley  YOB: 1931  MRN: [id number]   Acct: [de-identified]  Admit Date:  6/20/2018  Primary Cardiologist:Dr. Vanda Acuna      Procedures  6/18  TRISTEN: 30% LVEF, biatrial enlargement. Moderate effusion. Normal valves            DCC    Impression/Plan:  1. Atrial fib with RVR: responded well to [facility] after amio begun. Now in sinus. Very low heart rate variability and ave rate slow especially concerning given age. Clinically remains in sinus. ECG on Monday. 2. Dilated cardiomyopathy: likely rate related and hopefull will improve with sinus. Still with edema, no chf on pulmonary exam. Tolerating toprol and  CARLIE blocker. Volume status probably near baseline. Continue current diuretic but add aldactone as potassium low. 3. Acute systolic chf: as above  4. Hypokalemia/hypomagnesemia: replete, will improve with addition of Carlie.  add aldactone . Chief Complaint/Active Symptoms: Less fatigue, minimal dyspnea day 2 rehab. No angina. No palp or dizziness. Edema unchanged    Echo 2014: 55% LVEF. Long standing hypertension  Persistent atrial fibrillation  No history of CVA, CHF, valvular or coronary disease    Objective:   /70   Pulse 60   Temp 98 °F (36.7 °C) (Oral)   Resp 17   Ht 5' 6\" (1.676 m)   Wt 182 lb 15.7 oz (83 kg)   SpO2 96%   BMI 29.53 kg/m²    Wt Readings from Last 3 Encounters:   06/20/18 182 lb 15.7 oz (83 kg)   06/20/18 180 lb 12.4 oz (82 kg)   05/22/18 186 lb 3.2 oz (84.5 kg)       NYHA Class: III    Physical Exam:  General Appearance: alert and oriented to person, place and time, in no acute distress, but appears fatigued. Cardiovascular: normal rate, regular rhythm, normal S1 and S2, I/VI flow murmurs. No rubs, clicks. Probable S3 gallops.    no JVD   Pulmonary/Chest: clear to auscultation bilaterally- no wheezes, rales or rhonchi, normal air movement, no respiratory distress  Abdomen: soft, non-tender, non-distended,  06/22/2018    K 3.3 06/22/2018    CL 94 06/22/2018    CO2 35 06/22/2018    BUN 18 06/22/2018    CREATININE 1.02 06/22/2018    GFRAA >60.0 06/22/2018    LABGLOM >60.0 06/22/2018    GLUCOSE 77 06/22/2018    GLUCOSE 84 12/08/2011    CALCIUM 8.6 06/22/2018       Hepatic Function Panel:    Lab Results   Component Value Date    ALKPHOS 63 06/19/2018    ALT 25 06/19/2018    AST 21 06/19/2018    PROT 4.9 06/19/2018    BILITOT 1.1 06/19/2018    LABALBU 2.6 06/19/2018    LABALBU 4.1 12/08/2011       Magnesium:    Lab Results   Component Value Date    MG 1.7 06/20/2018       PT/INR:    Lab Results   Component Value Date    PROTIME 19.7 06/17/2018    PROTIME 60.5 05/17/2018    PROTIME 51.3 05/17/2018    PROTIME 26.7 09/21/2016    INR 1.9 06/17/2018       Active Problems:    Generalized weakness    Pneumonia  Resolved Problems:    * No resolved hospital problems.  *           Electronically signed by Nenita Aquino MD on 6/22/2018 at 4:59 PM

## 2018-06-23 LAB
ANION GAP SERPL CALCULATED.3IONS-SCNC: 9 MEQ/L (ref 7–13)
BUN BLDV-MCNC: 17 MG/DL (ref 8–23)
CALCIUM SERPL-MCNC: 8.7 MG/DL (ref 8.6–10.2)
CHLORIDE BLD-SCNC: 96 MEQ/L (ref 98–107)
CO2: 35 MEQ/L (ref 22–29)
CREAT SERPL-MCNC: 1 MG/DL (ref 0.7–1.2)
GFR AFRICAN AMERICAN: >60
GFR NON-AFRICAN AMERICAN: >60
GLUCOSE BLD-MCNC: 79 MG/DL (ref 74–109)
POTASSIUM SERPL-SCNC: 4.1 MEQ/L (ref 3.5–5.1)
SODIUM BLD-SCNC: 140 MEQ/L (ref 132–144)
URINE CULTURE, ROUTINE: NORMAL

## 2018-06-23 PROCEDURE — 97110 THERAPEUTIC EXERCISES: CPT

## 2018-06-23 PROCEDURE — 97116 GAIT TRAINING THERAPY: CPT

## 2018-06-23 PROCEDURE — 80048 BASIC METABOLIC PNL TOTAL CA: CPT

## 2018-06-23 PROCEDURE — 6370000000 HC RX 637 (ALT 250 FOR IP): Performed by: INTERNAL MEDICINE

## 2018-06-23 PROCEDURE — 6370000000 HC RX 637 (ALT 250 FOR IP): Performed by: PHYSICAL MEDICINE & REHABILITATION

## 2018-06-23 PROCEDURE — 1180000000 HC REHAB R&B

## 2018-06-23 PROCEDURE — 36415 COLL VENOUS BLD VENIPUNCTURE: CPT

## 2018-06-23 PROCEDURE — 97530 THERAPEUTIC ACTIVITIES: CPT

## 2018-06-23 PROCEDURE — 97535 SELF CARE MNGMENT TRAINING: CPT

## 2018-06-23 RX ADMIN — LATANOPROST 1 DROP: 50 SOLUTION OPHTHALMIC at 20:12

## 2018-06-23 RX ADMIN — Medication: at 14:57

## 2018-06-23 RX ADMIN — BRIMONIDINE TARTRATE 1 DROP: 2 SOLUTION OPHTHALMIC at 20:12

## 2018-06-23 RX ADMIN — TIMOLOL MALEATE 1 DROP: 5 SOLUTION OPHTHALMIC at 14:53

## 2018-06-23 RX ADMIN — OXYBUTYNIN CHLORIDE 2.5 MG: 5 TABLET ORAL at 20:12

## 2018-06-23 RX ADMIN — LOSARTAN POTASSIUM 25 MG: 25 TABLET ORAL at 10:11

## 2018-06-23 RX ADMIN — METOPROLOL SUCCINATE 25 MG: 25 TABLET, EXTENDED RELEASE ORAL at 10:09

## 2018-06-23 RX ADMIN — TIMOLOL MALEATE 1 DROP: 5 SOLUTION OPHTHALMIC at 20:12

## 2018-06-23 RX ADMIN — POTASSIUM CHLORIDE 20 MEQ: 20 TABLET, EXTENDED RELEASE ORAL at 18:20

## 2018-06-23 RX ADMIN — FUROSEMIDE 20 MG: 20 TABLET ORAL at 10:09

## 2018-06-23 RX ADMIN — Medication: at 09:54

## 2018-06-23 RX ADMIN — POTASSIUM CHLORIDE 20 MEQ: 20 TABLET, EXTENDED RELEASE ORAL at 14:53

## 2018-06-23 RX ADMIN — BRIMONIDINE TARTRATE 1 DROP: 2 SOLUTION OPHTHALMIC at 14:53

## 2018-06-23 RX ADMIN — APIXABAN 5 MG: 5 TABLET, FILM COATED ORAL at 10:09

## 2018-06-23 RX ADMIN — APIXABAN 5 MG: 5 TABLET, FILM COATED ORAL at 20:12

## 2018-06-23 RX ADMIN — OXYBUTYNIN CHLORIDE 2.5 MG: 5 TABLET ORAL at 10:10

## 2018-06-23 RX ADMIN — SPIRONOLACTONE 12.5 MG: 25 TABLET, FILM COATED ORAL at 10:10

## 2018-06-23 RX ADMIN — AMIODARONE HYDROCHLORIDE 200 MG: 200 TABLET ORAL at 10:10

## 2018-06-23 RX ADMIN — OXYBUTYNIN CHLORIDE 2.5 MG: 5 TABLET ORAL at 14:53

## 2018-06-23 RX ADMIN — POTASSIUM CHLORIDE 20 MEQ: 20 TABLET, EXTENDED RELEASE ORAL at 10:11

## 2018-06-23 ASSESSMENT — PAIN SCALES - GENERAL: PAINLEVEL_OUTOF10: 0

## 2018-06-23 NOTE — PROGRESS NOTES
Subjective: The patient complains of moderate  acute pain relieved by rest and exacerbated by activity. I am concerned about patients fatigue. ROS x10: The patient also complains of severely impaired mobility and activities of daily living. Otherwise no new problems with vision, hearing, nose, mouth, throat, dermal, cardiovascular, GI, , pulmonary, musculoskeletal, psychiatric or neurological. See Rehab H&P on Rehab chart dated . Vital signs:  /66   Pulse 63   Temp 97 °F (36.1 °C) (Oral)   Resp 18   Ht 5' 6\" (1.676 m)   Wt 182 lb 15.7 oz (83 kg)   SpO2 98%   BMI 29.53 kg/m²   I/O:   PO/Intake:  fair PO intake, no problems observed or reported. Bowel/Bladder:  continent, no problems noted. General:  Patient is well developed, adequately nourished, non-obese and     well kempt. HEENT:    PERRLA, hearing intact to loud voice, external inspection of ear     and nose benign. Inspection of lips, tongue and gums benign  Musculoskeletal: No significant change in strength or tone. All joints stable. Inspection and palpation of digits and nails show no clubbing,       cyanosis or inflammatory conditions. Neuro/Psychiatric: Affect: flat but pleasant. Alert and oriented to person, place and     situation. No significant change in deep tendon reflexes or     sensation  Lungs:  CTA-B. Respiration effort is normal at rest.     Heart:   S1 = S2, RRR. No loud murmurs. Abdomen:  Soft, non-tender, no enlargement of liver or spleen. Extremities:  No significant lower extremity edema or tenderness. Skin:   Intact to general survey, no visualized or palpated problems.     Rehabilitation:  Physical therapy: FIMS:  Bed Mobility: Scooting: Minimal assistance    Transfers: Sit to Stand: Contact guard assistance  Stand to sit: Contact guard assistance  Bed to Chair: Contact guard assistance, Ambulation 1  Surface: carpet  Device: Rolling Walker  Other Apparatus: Wheelchair follow  Assistance: Contact guard assistance  Quality of Gait: Decreased step length and decreased posture. VCs to correct. Distance: 50ftx2  Comments: Focused gait training on improving posture and step length.  , Stairs  # Steps : 4  Stairs Height: 6\"  Assistance: Contact guard assistance  Comment: Initiated stair training to simulate 3 stair home enterance. VCs to bring hands anteriorly with descent. FIMS: Bed, Chair, Wheel Chair: 0 - Did not occur  Walk: 1 - Total Assistance Walks/operates wheelchair < 50 feet OR requires two or more people OR patient performs < 25% of locomotion effort  Distance Walked: 15  Stairs: 0 - Activity Does not Occur ( 0 only for the admission assessment),  , Assessment: Able to initiate stair training. Encouragement needed to complete session. Occupational therapy: FIMS:  Eatin - Feeds self with setup/supervision/cues and/or requires only setup/supervision/cues to perform tube feedings  Groomin - Did not occur  Bathin - Did not occur  Dressing-Upper: 0 - Did not occur  Dressing-Lower: 0 - Did not occur  Toiletin - Requires setup/supervision/cues  Toilet Transfer: 0 - Did not occur  Tub Transfer: 0 - Activity does not occur  Shower Transfer: 3 - Moderate assistance, pt. expends 50%-74% effort,  , Assessment: Pt is an 80year old male admitted to [facility] d/t pneumonia and alterned cardiopulmonary with generalized weakness. Upon evaluation, pt presents with decreased strength, activity tolerance, static and dynamic standing balance which impacts his safety and independence with functional mobility/transfers and ADLs. Pt would benefit from skilled OT services to address noted impairments and improve overall safety, independence, and quality of life prior to discharge to least restrictive environment.      Speech therapy: FIMS: Comprehension: 4 - Patient understands basic needs 75-90%+ of the time  Expression: 5 - Expresses basic ideas/needs only BIBASILAR PATCHY TO COALESCENT INFILTRATES CONSOLIDATION BOTH BASES RIGHT GREATER THAN LEFT. THERE IS TRACE RIGHT PLEURAL EFFUSION       Previous extensive, complex labs, notes and diagnostics reviewed and analyzed. ALLERGIES:    Allergies as of 06/20/2018 - Review Complete 06/20/2018   Allergen Reaction Noted    Sulfa antibiotics  11/30/2011      (please also verify by checking [facility])     Complex Physical Medicine & Rehab Issues Assess & Plan:   1. Severe abnormality of gait and mobility and impaired self-care and ADL's secondary to progressive  afib . Functional and medical status reassessed regarding patients ability to participate in therapies and patient found to be able to participate in acute intensive comprehensive inpatient rehabilitation program including PT/OT to improve balance, ambulation, ADLs, and to improve the P/AROM. Therapeutic modifications regarding activities in therapies, place, amount of time per day and intensity of therapy made daily. In bed therapies or bedside therapies prn.   2. Bowel and Bladder dysfunction:  frequent toileting, ambulate to bathroom with assistance, check post void residuals. Check for C.difficile x1 if >2 loose stools in 24 hours, continue bowel & bladder program.  Monitor bowel and bladder function. Lactinex 2 PO every AC. MOM prn, Brown Bomb prn, Glycerin suppository prn, enema prn. 3. Moderate generalized OA pain: reassess pain every shift and prior to and after each therapy session, give prn Tylenol and  , modalities prn in therapy, Lidoderm, K-pad prn.   4. Skin healing and breakdown risk:  continue pressure relief program.  Daily skin exams and reports from nursing.   5. Fatigue due to nutritional and hydration deficiency:  continue to monitor I&Os, calorie counts prn, dietary consult prn.  6. 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 7. Falls risk elevated:  patient to use call light to get nursing assistance to get up, bed and chair alarm. 8. Elevated DVT risk: progressive activities in PT, continue prophylaxis JANET hose, elevation and   .  9. Complex discharge planning:  Weekly team meeting every Monday to assess progress towards goals, discuss and address social, psychological and medical comorbidities and to address difficulties they may be having progressing in therapy. Patient and family education is in progress. The patient is to follow-up with their family physician after discharge. Complex Active General Medical Issues that complicate care Assess & Plan:    Patient Active Problem List   Diagnosis    Fistula    Atrial fibrillation ([facility] [address]    Benign prostatic hyperplasia    Hypertension    Compression fracture of vertebra with routine healing    H/O total knee replacement    [facility] fibrillation with [facility]VR ([facility])    Acute systolic CHF (congestive heart failure), NYHA class 4 ([facility])    Essential hypertension    Generalized weakness    Pneumonia   1.    Kaylee Jc D.O., PM&R     Attending    [facility] L[facility] Lipscomb

## 2018-06-23 NOTE — PROGRESS NOTES
Subjective: The patient complains of moderate  acute pain relieved by rest and exacerbated by activity. I am concerned about patients fatigue. ROS x10: The patient also complains of severely impaired mobility and activities of daily living. Otherwise no new problems with vision, hearing, nose, mouth, throat, dermal, cardiovascular, GI, , pulmonary, musculoskeletal, psychiatric or neurological. See Rehab H&P on Rehab chart dated . Vital signs:  /66   Pulse 63   Temp 97 °F (36.1 °C) (Oral)   Resp 18   Ht 5' 6\" (1.676 m)   Wt 182 lb 15.7 oz (83 kg)   SpO2 98%   BMI 29.53 kg/m²   I/O:   PO/Intake:  fair PO intake, no problems observed or reported. Bowel/Bladder:  continent, no problems noted. General:  Patient is well developed, adequately nourished, non-obese and     well kempt. HEENT:    PERRLA, hearing intact to loud voice, external inspection of ear     and nose benign. Inspection of lips, tongue and gums benign  Musculoskeletal: No significant change in strength or tone. All joints stable. Inspection and palpation of digits and nails show no clubbing,       cyanosis or inflammatory conditions. Neuro/Psychiatric: Affect: flat but pleasant. Alert and oriented to person, place and     situation. No significant change in deep tendon reflexes or     sensation  Lungs:  CTA-B. Respiration effort is normal at rest.     Heart:   S1 = S2, RRR. No loud murmurs. Abdomen:  Soft, non-tender, no enlargement of liver or spleen. Extremities:  No significant lower extremity edema or tenderness. Skin:   Intact to general survey, no visualized or palpated problems.     Rehabilitation:  Physical therapy: FIMS:  Bed Mobility: Scooting: Minimal assistance    Transfers: Sit to Stand: Contact guard assistance  Stand to sit: Contact guard assistance  Bed to Chair: Contact guard assistance, Ambulation 1  Surface: carpet  Device: Rolling Walker  Other Apparatus: Wheelchair (hungry/hot/pain)  Social Interaction: 5 - Patient is appropriate with supervision/cues  Problem Solving: 3 - Patient solves simple/routine tasks 50%-74%  Memory: 3 - Patient remembers 50%-74% of the time      Lab/X-ray studies reviewed, analyzed and discussed with patient and staff:   Recent Results (from the past 24 hour(s))   Urinalysis    Collection Time: 06/21/18 11:33 PM   Result Value Ref Range    Color, UA Yellow Straw/Yellow    Clarity, UA Clear Clear    Glucose, Ur Negative Negative mg/dL    Bilirubin Urine Negative Negative    Ketones, Urine Negative Negative mg/dL    Specific Gravity, UA 1.012 1.005 - 1.030    Blood, Urine Negative Negative    pH, UA 6.5 5.0 - 9.0    Protein, UA Negative Negative mg/dL    Urobilinogen, Urine 1.0 <2.0 E.U./dL    Nitrite, Urine Negative Negative    Leukocyte Esterase, Urine Negative Negative   Basic Metabolic Panel    Collection Time: 06/22/18  6:00 AM   Result Value Ref Range    Sodium 138 132 - 144 mEq/L    Potassium 3.3 (L) 3.5 - 5.1 mEq/L    Chloride 94 (L) 98 - 107 mEq/L    CO2 35 (H) 22 - 29 mEq/L    Anion Gap 9 7 - 13 mEq/L    Glucose 77 74 - 109 mg/dL    BUN 18 8 - 23 mg/dL    CREATININE 1.02 0.70 - 1.20 mg/dL    GFR Non-African American >60.0 >60    GFR  >60.0 >60    Calcium 8.6 8.6 - 10.2 mg/dL   CBC auto differential    Collection Time: 06/22/18  6:00 AM   Result Value Ref Range    WBC 6.4 4.8 - 10.8 K/uL    RBC 4.33 (L) 4.70 - 6.10 M/uL    Hemoglobin 13.2 (L) 14.0 - 18.0 g/dL    Hematocrit 38.7 (L) 42.0 - 52.0 %    MCV 89.3 80.0 - 100.0 fL    MCH 30.4 27.0 - 31.3 pg    MCHC 34.1 33.0 - 37.0 %    RDW 14.6 (H) 11.5 - 14.5 %    Platelets 629 478 - 252 K/uL    Neutrophils % 70.3 %    Lymphocytes % 13.0 %    Monocytes % 9.5 %    Eosinophils % 6.8 %    Basophils % 0.4 %    Neutrophils # 4.5 1.4 - 6.5 K/uL    Lymphocytes # 0.8 (L) 1.0 - 4.8 K/uL    Monocytes # 0.6 0.2 - 0.8 K/uL    Eosinophils # 0.4 0.0 - 0.7 K/uL    Basophils # 0.0 0.0 - 0.2 K/uL BIBASILAR PATCHY TO COALESCENT INFILTRATES CONSOLIDATION BOTH BASES RIGHT GREATER THAN LEFT. THERE IS TRACE RIGHT PLEURAL EFFUSION       Previous extensive, complex labs, notes and diagnostics reviewed and analyzed. ALLERGIES:    Allergies as of 06/20/2018 - Review Complete 06/20/2018   Allergen Reaction Noted    Sulfa antibiotics  11/30/2011      (please also verify by checking [facility])     Complex Physical Medicine & Rehab Issues Assess & Plan:   1. Severe abnormality of gait and mobility and impaired self-care and ADL's secondary to progressive  afib . Functional and medical status reassessed regarding patients ability to participate in therapies and patient found to be able to participate in acute intensive comprehensive inpatient rehabilitation program including PT/OT to improve balance, ambulation, ADLs, and to improve the P/AROM. Therapeutic modifications regarding activities in therapies, place, amount of time per day and intensity of therapy made daily. In bed therapies or bedside therapies prn.   2. Bowel and Bladder dysfunction:  frequent toileting, ambulate to bathroom with assistance, check post void residuals. Check for C.difficile x1 if >2 loose stools in 24 hours, continue bowel & bladder program.  Monitor bowel and bladder function. Lactinex 2 PO every AC. MOM prn, Brown Bomb prn, Glycerin suppository prn, enema prn. 3. Moderate generalized OA pain: reassess pain every shift and prior to and after each therapy session, give prn Tylenol and  , modalities prn in therapy, Lidoderm, K-pad prn.   4. Skin healing and breakdown risk:  continue pressure relief program.  Daily skin exams and reports from nursing.   5. Fatigue due to nutritional and hydration deficiency:  continue to monitor I&Os, calorie counts prn, dietary consult prn.  6. Acute episodic insomnia with situational adjustment disorder:  prn Ambien, monitor for day

## 2018-06-23 NOTE — PROGRESS NOTES
functional mobility ; Decreased safe awareness;Decreased endurance;Decreased strength;Decreased balance;Decreased ADL status; Decreased ROM  Assessment: Patient required several restbreaks, challenged by increased repititions this session. Patients gait deviations improved until fatigued. PLAN OF CARE/Safety:   Safety Devices  Type of devices:  All fall risk precautions in place  Therapy Time:   Individual   Time In 1100   Time Out 1200   Minutes 60     Minutes:60      Transfer/Bed mobility trainin      Gait trainin      Neuro re education:0     Therapeutic ex:20      Tomeka Matthew PTA, 18 at 11:58 AM

## 2018-06-23 NOTE — PROGRESS NOTES
22Occupational Therapy  Facility/Department: [facility]  Daily Treatment Note  NAME: Darrel Douglas  : 3/7/1931  MRN: [id number]    Date of Service: 2018    Discharge Recommendations:  Continue to assess pending progress       Patient Diagnosis(es): Pneumonia, altered Cardiopulmonary and generalized  weakness     has a past medical history of Atrial fibrillation ([address] BPH (benign prostatic hypertrophy); Fistula; Glaucoma; and Hypertension. has a past surgical history that includes AV fistula repair and Cholecystectomy. Restrictions  Restrictions/Precautions  Restrictions/Precautions: Fall Risk  Required Braces or Orthoses?: No  Subjective   General  Chart Reviewed: Yes  Patient assessed for rehabilitation services?: Yes  Family / Caregiver Present: No  Referring Practitioner: Dr. Nini Valverde  Diagnosis: Pneumonia and alterned cardiopulmonary with generalized weakness       Orientation:A&Ox3     Objective   Pt was instrtucted to follow written instruction to sort various colored beads into a pill box. Pt instructed in bathroom transfers with [employer] and transfers in/out of various type chairs. Assessment      Pt min verb cues to complete simulated pill sorting task into pill box, CGA with tub transfer with grab bars from [employer] levl , Mod cues for safety. SBA with sit<->stand  transfers into plastic chair with arms and a soft chair with arms 3-4 min rest break required between transfers.  Min verb cues for safety and hand placement             Plan   Plan  Times per week:  minutes per day, 5-7 days per week  Plan weeks: 8-12 days   Current Treatment Recommendations: Strengthening, ROM, Balance Training, Endurance Training, Functional Mobility Training, Neuromuscular Re-education, Safety Education & Training, Equipment Evaluation, Education, & procurement, Patient/Caregiver Education & Training, Home Management Training, Self-Care / ADL          Goals  Patient Goals   Patient goals : \"I want to do things myself. \" \"I used to shower standing. \"        Therapy Time   Individual Concurrent Group Co-treatment   Time In  10:05         Time Out  11:05         Minutes                   Tova Valerio OTR/L

## 2018-06-23 NOTE — PROGRESS NOTES
CARDIOLOGY [address]  [facility] PROGRESS NOTE         6/23/2018      Juliet Raza    [id number]  3/7/1931    Rounding MD: Max Elizabeth MD ,[facility]    Primary Cardiologist:  Ozzy Alexander MD    SUBJECTIVE:     Patient has no new complaints. No CP. Mild SOB. Feels well otherwise. Cardiac and general ROS otherwise negative and unchanged.     Impression:    1. Atrial fib with RVR: Post DCC on AMiodarone. Remains in sinus. 2. Dilated cardiomyopathy: likely rate related, 6/18  TRISTEN: 30% LVEF, biatrial enlargement. Moderate effusion. Normal valves  3. Acute systolic chf  4. Hypokalemia/hypomagnesemia  5. HTN    Recommendations:    Supportive care. Continue Rehab activities. Continue current medications. Repeat ECG and Labs Monday. See Orders.     OBJECTIVE:     MEDICATIONS:     Scheduled Meds:   spironolactone  12.5 mg Oral Daily    potassium chloride  20 mEq Oral TID WC    amiodarone  200 mg Oral Daily    furosemide  20 mg Oral Daily    losartan  25 mg Oral Daily    metoprolol succinate  25 mg Oral Daily    apixaban  5 mg Oral BID    brimonidine  1 drop Both Eyes BID    And    timolol  1 drop Both Eyes BID    fluticasone  1 spray Each Nare Daily    latanoprost  1 drop Both Eyes Nightly    oxybutynin  2.5 mg Oral TID    sodium chloride flush  10 mL Intravenous 2 times per day    nystatin, stomahesive in petrolatum   Topical TID     Continuous Infusions:  PRN Meds:ipratropium-albuterol, magnesium hydroxide, sodium chloride flush    PHYSICAL EXAM:    CURRENT VITALS: /77   Pulse 66   Temp 98 °F (36.7 °C)   Resp 20   Ht 5' 6\" (1.676 m)   Wt 182 lb 15.7 oz (83 kg)   SpO2 99%   BMI 29.53 kg/m²     CONSTITUTIONAL:  awake, alert, cooperative, no apparent distress,   ENT:  Normocephalic, without obvious abnormality, atraumatic, sinuses nontender on palpation, external ears without lesions,  NECK:  Supple, symmetrical, trachea midline, no adenopathy, thyroid symmetric, not enlarged and no tenderness, skin normal  LUNGS:  No increased work of breathing, good air exchange, clear to auscultation bilaterally, no crackles or wheezing  CARDIOVASCULAR:  Normal apical impulse, regular rate and rhythm, normal S1 and S2,  and no murmur noted  ABDOMEN:  Normal bowel sounds, soft, non-distended, non-tender, no masses palpated, no hepatosplenomegally  EXTREMITIES:  No edema, Pulses strong throughout. NEUROLOGIC:  Awake, alert, oriented to name, place and time. Following all commands and moving all extremties  SKIN:  no bruising or bleeding, normal skin color, texture, turgor and no rashes     Data:        LABS:      Recent Results (from the past 24 hour(s))   Basic Metabolic Panel    Collection Time: 06/23/18  6:00 AM   Result Value Ref Range    Sodium 140 132 - 144 mEq/L    Potassium 4.1 3.5 - 5.1 mEq/L    Chloride 96 (L) 98 - 107 mEq/L    CO2 35 (H) 22 - 29 mEq/L    Anion Gap 9 7 - 13 mEq/L    Glucose 79 74 - 109 mg/dL    BUN 17 8 - 23 mg/dL    CREATININE 1.00 0.70 - 1.20 mg/dL    GFR Non-African American >60.0 >60    GFR  >60.0 >60    Calcium 8.7 8.6 - 10.2 mg/dL        EKG: Pending     Echo: 6/18  TRISTEN: 30% LVEF, biatrial enlargement. Moderate effusion.  Normal valves        Martin Pino MD ,[address] [place] [address] Cardiology

## 2018-06-24 LAB
ANION GAP SERPL CALCULATED.3IONS-SCNC: 9 MEQ/L (ref 7–13)
BILIRUBIN URINE: NEGATIVE
BLOOD, URINE: NEGATIVE
BUN BLDV-MCNC: 18 MG/DL (ref 8–23)
CALCIUM SERPL-MCNC: 8.6 MG/DL (ref 8.6–10.2)
CHLORIDE BLD-SCNC: 97 MEQ/L (ref 98–107)
CLARITY: CLEAR
CO2: 34 MEQ/L (ref 22–29)
COLOR: YELLOW
CREAT SERPL-MCNC: 1.14 MG/DL (ref 0.7–1.2)
EPITHELIAL CELLS, UA: NORMAL /HPF
GFR AFRICAN AMERICAN: >60
GFR NON-AFRICAN AMERICAN: >60
GLUCOSE BLD-MCNC: 78 MG/DL (ref 74–109)
GLUCOSE URINE: NEGATIVE MG/DL
KETONES, URINE: NEGATIVE MG/DL
LEUKOCYTE ESTERASE, URINE: ABNORMAL
NITRITE, URINE: NEGATIVE
PH UA: 8 (ref 5–9)
POTASSIUM SERPL-SCNC: 4.5 MEQ/L (ref 3.5–5.1)
PROTEIN UA: NEGATIVE MG/DL
RBC UA: NORMAL /HPF (ref 0–2)
SODIUM BLD-SCNC: 140 MEQ/L (ref 132–144)
SPECIFIC GRAVITY UA: 1 (ref 1–1.03)
URINE REFLEX TO CULTURE: YES
UROBILINOGEN, URINE: 1 E.U./DL
WBC UA: NORMAL /HPF (ref 0–5)

## 2018-06-24 PROCEDURE — 36415 COLL VENOUS BLD VENIPUNCTURE: CPT

## 2018-06-24 PROCEDURE — 1180000000 HC REHAB R&B

## 2018-06-24 PROCEDURE — 87086 URINE CULTURE/COLONY COUNT: CPT

## 2018-06-24 PROCEDURE — 80048 BASIC METABOLIC PNL TOTAL CA: CPT

## 2018-06-24 PROCEDURE — 81001 URINALYSIS AUTO W/SCOPE: CPT

## 2018-06-24 PROCEDURE — 6370000000 HC RX 637 (ALT 250 FOR IP): Performed by: INTERNAL MEDICINE

## 2018-06-24 PROCEDURE — 6370000000 HC RX 637 (ALT 250 FOR IP): Performed by: PHYSICAL MEDICINE & REHABILITATION

## 2018-06-24 PROCEDURE — 6370000000 HC RX 637 (ALT 250 FOR IP): Performed by: PODIATRIST

## 2018-06-24 RX ORDER — AMMONIUM LACTATE 12 G/100G
LOTION TOPICAL DAILY
Status: DISCONTINUED | OUTPATIENT
Start: 2018-06-24 | End: 2018-06-30 | Stop reason: HOSPADM

## 2018-06-24 RX ORDER — ACETAMINOPHEN 325 MG/1
650 TABLET ORAL EVERY 4 HOURS PRN
Status: DISCONTINUED | OUTPATIENT
Start: 2018-06-24 | End: 2018-06-30 | Stop reason: HOSPADM

## 2018-06-24 RX ADMIN — TIMOLOL MALEATE 1 DROP: 5 SOLUTION OPHTHALMIC at 10:44

## 2018-06-24 RX ADMIN — LATANOPROST 1 DROP: 50 SOLUTION OPHTHALMIC at 21:17

## 2018-06-24 RX ADMIN — BRIMONIDINE TARTRATE 1 DROP: 2 SOLUTION OPHTHALMIC at 10:50

## 2018-06-24 RX ADMIN — OXYBUTYNIN CHLORIDE 2.5 MG: 5 TABLET ORAL at 21:14

## 2018-06-24 RX ADMIN — APIXABAN 5 MG: 5 TABLET, FILM COATED ORAL at 10:48

## 2018-06-24 RX ADMIN — OXYBUTYNIN CHLORIDE 2.5 MG: 5 TABLET ORAL at 10:46

## 2018-06-24 RX ADMIN — LOSARTAN POTASSIUM 25 MG: 25 TABLET ORAL at 10:47

## 2018-06-24 RX ADMIN — Medication: at 09:00

## 2018-06-24 RX ADMIN — APIXABAN 5 MG: 5 TABLET, FILM COATED ORAL at 21:13

## 2018-06-24 RX ADMIN — METOPROLOL SUCCINATE 25 MG: 25 TABLET, EXTENDED RELEASE ORAL at 10:46

## 2018-06-24 RX ADMIN — ACETAMINOPHEN 650 MG: 325 TABLET ORAL at 22:53

## 2018-06-24 RX ADMIN — FUROSEMIDE 20 MG: 20 TABLET ORAL at 10:46

## 2018-06-24 RX ADMIN — MUPIROCIN: 20 OINTMENT TOPICAL at 16:14

## 2018-06-24 RX ADMIN — POTASSIUM CHLORIDE 20 MEQ: 20 TABLET, EXTENDED RELEASE ORAL at 16:14

## 2018-06-24 RX ADMIN — SPIRONOLACTONE 12.5 MG: 25 TABLET, FILM COATED ORAL at 10:47

## 2018-06-24 RX ADMIN — OXYBUTYNIN CHLORIDE 2.5 MG: 5 TABLET ORAL at 13:31

## 2018-06-24 RX ADMIN — AMIODARONE HYDROCHLORIDE 200 MG: 200 TABLET ORAL at 10:46

## 2018-06-24 RX ADMIN — Medication: at 13:33

## 2018-06-24 RX ADMIN — POTASSIUM CHLORIDE 20 MEQ: 20 TABLET, EXTENDED RELEASE ORAL at 13:31

## 2018-06-24 RX ADMIN — Medication: at 19:34

## 2018-06-24 RX ADMIN — POTASSIUM CHLORIDE 20 MEQ: 20 TABLET, EXTENDED RELEASE ORAL at 10:48

## 2018-06-24 ASSESSMENT — PAIN SCALES - GENERAL
PAINLEVEL_OUTOF10: 0
PAINLEVEL_OUTOF10: 0
PAINLEVEL_OUTOF10: 3
PAINLEVEL_OUTOF10: 3
PAINLEVEL_OUTOF10: 0

## 2018-06-24 NOTE — PROGRESS NOTES
CARDIOLOGY 1451  [place] PROGRESS NOTE         6/24/2018      Keysha Live    [id number]  3/7/1931    Rounding MD: Rubi Chaney MD ,[facility] [place]    Primary Cardiologist:  Saritha Cadena MD    SUBJECTIVE:     Patient has no new complaints. No CP. Mild SOB. Feels well otherwise. Cardiac and general ROS otherwise negative and unchanged.     Impression:    1. Atrial fib with RVR: Post DCC on AMiodarone. Remains in sinus. 2. Dilated cardiomyopathy: likely rate related, 6/18  TRISTEN: 30% LVEF, biatrial enlargement. Moderate effusion. Normal valves  3. Acute systolic chf  4. Hypokalemia/hypomagnesemia  5. HTN    Recommendations:    Supportive care. Continue Rehab activities. Continue current medications. Repeat ECG and Labs Monday. See Orders.     OBJECTIVE:     MEDICATIONS:     Scheduled Meds:   spironolactone  12.5 mg Oral Daily    potassium chloride  20 mEq Oral TID WC    amiodarone  200 mg Oral Daily    furosemide  20 mg Oral Daily    losartan  25 mg Oral Daily    metoprolol succinate  25 mg Oral Daily    apixaban  5 mg Oral BID    brimonidine  1 drop Both Eyes BID    And    timolol  1 drop Both Eyes BID    fluticasone  1 spray Each Nare Daily    latanoprost  1 drop Both Eyes Nightly    oxybutynin  2.5 mg Oral TID    nystatin, stomahesive in petrolatum   Topical TID     Continuous Infusions:  PRN Meds:ipratropium-albuterol, magnesium hydroxide    PHYSICAL EXAM:    CURRENT VITALS: /81   Pulse 77   Temp 97 °F (36.1 °C) (Oral)   Resp 18   Ht 5' 6\" (1.676 m)   Wt 182 lb 15.7 oz (83 kg)   SpO2 96%   BMI 29.53 kg/m²     CONSTITUTIONAL:  awake, alert, cooperative, no apparent distress,   ENT:  Normocephalic, without obvious abnormality, atraumatic, sinuses nontender on palpation, external ears without lesions,  NECK:  Supple, symmetrical, trachea midline, no adenopathy, thyroid symmetric, not enlarged and no tenderness, skin normal  LUNGS:  No increased work of breathing, good air exchange, clear to auscultation bilaterally, no crackles or wheezing  CARDIOVASCULAR:  Normal apical impulse, regular rate and rhythm, normal S1 and S2,  and no murmur noted  ABDOMEN:  Normal bowel sounds, soft, non-distended, non-tender, no masses palpated, no hepatosplenomegally  EXTREMITIES:  No edema, Pulses strong throughout. NEUROLOGIC:  Awake, alert, oriented to name, place and time. Following all commands and moving all extremties  SKIN:  no bruising or bleeding, normal skin color, texture, turgor and no rashes     Data:        LABS:      Recent Results (from the past 24 hour(s))   Basic Metabolic Panel    Collection Time: 06/24/18  5:24 AM   Result Value Ref Range    Sodium 140 132 - 144 mEq/L    Potassium 4.5 3.5 - 5.1 mEq/L    Chloride 97 (L) 98 - 107 mEq/L    CO2 34 (H) 22 - 29 mEq/L    Anion Gap 9 7 - 13 mEq/L    Glucose 78 74 - 109 mg/dL    BUN 18 8 - 23 mg/dL    CREATININE 1.14 0.70 - 1.20 mg/dL    GFR Non-African American >60.0 >60    GFR  >60.0 >60    Calcium 8.6 8.6 - 10.2 mg/dL        EKG: Pending     Echo: 6/18  TRISTEN: 30% LVEF, biatrial enlargement. Moderate effusion.  Normal valves        Rubi Chaney MD ,[address] [place] [address] Cardiology

## 2018-06-24 NOTE — CONSULTS
PODIATRIC MEDICINE AND SURGERY  CONSULT HISTORY AND PHYSICAL      Consulting Service:  Rehab  Requesting Provider: Dalila Mercado MD  Opinion/advice regarding: Elongated Toenails  Staff Doctor:  Dr. Sigifredo Scherer:  This 80 y.o. male with PMH of long-term anticoagulation presents with tinea unguium    Plan:  Exam and evaluation  Nails 1-5 bilateral debrided in length and thickness using nail nippers. Iatrogenic nick to left 3rd digit, covered with bandaid. Mupirocin and bandaid to left 3rd digit until healed. Orders placed  Recommend elevation of lower extremity with two pillows for edema management  Patient refuses compression stockings or ACE wraps   Discussed with patient can follow up with community podiatrist for nail care as outpatient if desired  Podiatry will sign off at this time. Please reconsult with any new issues      HPI: This very pleasant 80y.o. year old male with PMH of below listed conditions seen today for nail care. Patient states that the toenails on both feet are long, and difficult to trim. He usually uses a mirror at home to manage his nails. Patient denies nausea, vomiting, diarrhea, fevers, chills, chest pain, shortness of breath, head ache, or calf pain. No other pedal complaints. Past Medical History:   Diagnosis Date    Atrial fibrillation ([address]     BPH (benign prostatic hypertrophy)     Fistula     Glaucoma     Hypertension        Past Surgical History:   Procedure Laterality Date    AV FISTULA REPAIR      CHOLECYSTECTOMY         No current facility-administered medications on file prior to encounter.       Current Outpatient Prescriptions on File Prior to Encounter   Medication Sig Dispense Refill    apixaban (ELIQUIS) 5 MG TABS tablet Take 1 tablet by mouth 2 times daily 60 tablet 1    furosemide (LASIX) 20 MG tablet Take 1 tablet by mouth daily 60 tablet 3    losartan (COZAAR) 25 MG tablet Take 1 tablet by mouth daily 30 tablet 3    metoprolol succinate (TOPROL XL) 25 MG extended release tablet Take 1 tablet by mouth daily 30 tablet 3    Multiple Vitamin (MULTI-VITAMIN DAILY PO) Take 1 tablet by mouth daily      tamsulosin (FLOMAX) 0.4 MG capsule Take 2 capsules by mouth daily 180 capsule 3    brimonidine-timolol (COMBIGAN) 0.2-0.5 % ophthalmic solution Place 1 drop into both eyes every 12 hours.  Glucosamine-Chondroit-Vit C-Mn (GLUCOSAMINE 1500 COMPLEX) CAPS Take  by mouth daily.  vitamin E 400 UNIT capsule Take 400 Units by mouth daily.  amiodarone (CORDARONE) 200 MG tablet Take 1 tablet by mouth daily 30 tablet 3    oxybutynin (DITROPAN) 5 MG tablet Take 0.5 tablets by mouth 3 times daily 90 tablet 3    travoprost, benzalkonium, (TRAVATAN) 0.004 % ophthalmic solution 1 drop nightly. Allergies   Allergen Reactions    Sulfa Antibiotics        History reviewed. No pertinent family history. Social History     Social History    Marital status:      Spouse name: N/A    Number of children: N/A    Years of education: N/A     Occupational History    Not on file. Social History Main Topics    Smoking status: Never Smoker    Smokeless tobacco: Never Used    Alcohol use Yes      Comment: social    Drug use: No    Sexual activity: Not on file     Other Topics Concern    Not on file     Social History Narrative    No narrative on file         REVIEW OF SYSTEMS:  See HPI      OBJECTIVE:  /81   Pulse 77   Temp 97 °F (36.1 °C) (Oral)   Resp 18   Ht 5' 6\" (1.676 m)   Wt 182 lb 15.7 oz (83 kg)   SpO2 96%   BMI 29.53 kg/m²   Patient is alert and oriented x 3 in NAD. Vascular:  Palpable Dorsalis Pedis and Palpable Posterior Tibial Pulses B/L   Capillary Fill time < 3 seconds to B/L digits  Skin temperature warm to warm tibial tuberosity to the digits B/L  Hair growth absent to digits  severe and pitting edema, diffuse varicosities.  Diffuse discoloration to lower extremity and foot,

## 2018-06-24 NOTE — PROGRESS NOTES
Subjective: The patient complains of moderate  acute pain relieved by rest and exacerbated by activity. I am concerned about patients fatigue. ROS x10: The patient also complains of severely impaired mobility and activities of daily living. Otherwise no new problems with vision, hearing, nose, mouth, throat, dermal, cardiovascular, GI, , pulmonary, musculoskeletal, psychiatric or neurological. See Rehab H&P on Rehab chart dated . Vital signs:  BP (!) 108/57   Pulse 68   Temp 97 °F (36.1 °C) (Oral)   Resp 17   Ht 5' 6\" (1.676 m)   Wt 182 lb 15.7 oz (83 kg)   SpO2 94%   BMI 29.53 kg/m²   I/O:   PO/Intake:  fair PO intake, no problems observed or reported. Bowel/Bladder:  continent, no problems noted. General:  Patient is well developed, adequately nourished, non-obese and     well kempt. HEENT:    PERRLA, hearing intact to loud voice, external inspection of ear     and nose benign. Inspection of lips, tongue and gums benign  Musculoskeletal: No significant change in strength or tone. All joints stable. Inspection and palpation of digits and nails show no clubbing,       cyanosis or inflammatory conditions. Neuro/Psychiatric: Affect: flat but pleasant. Alert and oriented to person, place and     situation. No significant change in deep tendon reflexes or     sensation  Lungs:  CTA-B. Respiration effort is normal at rest.     Heart:   S1 = S2, RRR. No loud murmurs. Abdomen:  Soft, non-tender, no enlargement of liver or spleen. Extremities:  No significant lower extremity edema or tenderness. Skin:   Intact to general survey, no visualized or palpated problems.     Rehabilitation:  Physical therapy: FIMS:  Bed Mobility: Scooting: Contact guard assistance    Transfers: Sit to Stand: Contact guard assistance  Stand to sit: Contact guard assistance  Bed to Chair: Contact guard assistance, Ambulation 1  Surface: carpet  Device: Rolling Walker  Other Apparatus: Wheelchair prior to discharge to least restrictive environment. Speech therapy: FIMS: Comprehension: 4 - Patient understands basic needs 75-90%+ of the time  Expression: 5 - Expresses basic ideas/needs only (hungry/hot/pain)  Social Interaction: 5 - Patient is appropriate with supervision/cues  Problem Solving: 3 - Patient solves simple/routine tasks 50%-74%  Memory: 3 - Patient remembers 50%-74% of the time      Lab/X-ray studies reviewed, analyzed and discussed with patient and staff:   Recent Results (from the past 24 hour(s))   Basic Metabolic Panel    Collection Time: 06/24/18  5:24 AM   Result Value Ref Range    Sodium 140 132 - 144 mEq/L    Potassium 4.5 3.5 - 5.1 mEq/L    Chloride 97 (L) 98 - 107 mEq/L    CO2 34 (H) 22 - 29 mEq/L    Anion Gap 9 7 - 13 mEq/L    Glucose 78 74 - 109 mg/dL    BUN 18 8 - 23 mg/dL    CREATININE 1.14 0.70 - 1.20 mg/dL    GFR Non-African American >60.0 >60    GFR  >60.0 >60    Calcium 8.6 8.6 - 10.2 mg/dL       Xr Chest Standard (2 Vw)    Result Date: 6/19/2018  EXAMINATION: XR CHEST (2 VW) CLINICAL HISTORY: 80year-old with congestive heart failure and pulmonary symptoms COMPARISONS: Chest x-ray 6/17/2018 FINDINGS: Frontal and lateral views the chest were obtained. Cardiac silhouette is stable at the upper limits of normal in size. There is atherosclerotic calcification in the aortic arch. Superior mediastinum is not significantly widened. Pulmonary vascularity is not distended. Again demonstrated are small bilateral pleural effusions with bibasilar atelectasis/infiltrate or edema stable to slightly improved when compared to the previous study. Upper lung zones are clear. No plain film signs of pneumothorax. Patient is status post kyphoplasty of several lower thoracic/upper lumbar vertebral bodies.  Bony thorax is stable     SMALL PLEURAL EFFUSIONS WITH BIBASILAR ATELECTASIS/INFILTRATE OR EDEMA STABLE TO SLIGHTLY IMPROVED COMPARED TO THE PREVIOUS STUDY    Xr Chest Standard (2 Vw)    Result Date: 6/17/2018  EXAMINATION: XR CHEST (2 VW). DATE AND TIME:6/17/2018 12:00 AM CLINICAL HISTORY: Shortness of breath   SOB  COMPARISONS: June 11, 2018 FINDINGS: Persistent bibasilar effusions and atelectasis. No progression and perhaps minimal improvement. Mid and upper lung zones clear. Persistent bibasilar changes     Xr Chest Portable    Result Date: 6/12/2018  EXAMINATION: CHEST PORTABLE VIEW  CLINICAL HISTORY: Short of breath COMPARISONS: None  FINDINGS: Single  views of the chest is submitted. The cardiac silhouette is of normal size configuration. The mediastinum is unremarkable. Pulmonary vascular unremarkable. Right sided trachea. There are bibasilar patchy to coalescent infiltrates consolidation both bases right greater than left. There is trace right pleural effusion. No Pneumothoraces. BIBASILAR PATCHY TO COALESCENT INFILTRATES CONSOLIDATION BOTH BASES RIGHT GREATER THAN LEFT. THERE IS TRACE RIGHT PLEURAL EFFUSION       Previous extensive, complex labs, notes and diagnostics reviewed and analyzed. ALLERGIES:    Allergies as of 06/20/2018 - Review Complete 06/20/2018   Allergen Reaction Noted    Sulfa antibiotics  11/30/2011      (please also verify by checking [facility])     Complex Physical Medicine & Rehab Issues Assess & Plan:   1. Severe abnormality of gait and mobility and impaired self-care and ADL's secondary to progressive  afib . Functional and medical status reassessed regarding patients ability to participate in therapies and patient found to be able to participate in acute intensive comprehensive inpatient rehabilitation program including PT/OT to improve balance, ambulation, ADLs, and to improve the P/AROM. Therapeutic modifications regarding activities in therapies, place, amount of time per day and intensity of therapy made daily. In bed therapies or bedside therapies prn. MD Eldon Meek D.O., PM&R     Attending    [address]

## 2018-06-25 PROBLEM — H40.9 GLAUCOMA: Status: ACTIVE | Noted: 2018-06-25

## 2018-06-25 PROBLEM — R26.9 ABNORMALITY OF GAIT AND MOBILITY: Status: ACTIVE | Noted: 2018-06-25

## 2018-06-25 PROBLEM — Z90.49 HISTORY OF CHOLECYSTECTOMY: Status: ACTIVE | Noted: 2018-06-25

## 2018-06-25 LAB
ALBUMIN SERPL-MCNC: 2.7 G/DL (ref 3.9–4.9)
ALP BLD-CCNC: 63 U/L (ref 35–104)
ALT SERPL-CCNC: 18 U/L (ref 0–41)
ANION GAP SERPL CALCULATED.3IONS-SCNC: 10 MEQ/L (ref 7–13)
AST SERPL-CCNC: 20 U/L (ref 0–40)
BASOPHILS ABSOLUTE: 0.1 K/UL (ref 0–0.2)
BASOPHILS RELATIVE PERCENT: 0.9 %
BILIRUB SERPL-MCNC: 0.8 MG/DL (ref 0–1.2)
BUN BLDV-MCNC: 16 MG/DL (ref 8–23)
CALCIUM SERPL-MCNC: 8.5 MG/DL (ref 8.6–10.2)
CHLORIDE BLD-SCNC: 99 MEQ/L (ref 98–107)
CO2: 30 MEQ/L (ref 22–29)
CREAT SERPL-MCNC: 1.08 MG/DL (ref 0.7–1.2)
EKG ATRIAL RATE: 61 BPM
EKG P AXIS: 4 DEGREES
EKG P-R INTERVAL: 154 MS
EKG Q-T INTERVAL: 418 MS
EKG QRS DURATION: 94 MS
EKG QTC CALCULATION (BAZETT): 420 MS
EKG R AXIS: -25 DEGREES
EKG T AXIS: 30 DEGREES
EKG VENTRICULAR RATE: 61 BPM
EOSINOPHILS ABSOLUTE: 0.4 K/UL (ref 0–0.7)
EOSINOPHILS RELATIVE PERCENT: 6.3 %
GFR AFRICAN AMERICAN: >60
GFR NON-AFRICAN AMERICAN: >60
GLOBULIN: 2.3 G/DL (ref 2.3–3.5)
GLUCOSE BLD-MCNC: 82 MG/DL (ref 74–109)
HCT VFR BLD CALC: 37.9 % (ref 42–52)
HEMOGLOBIN: 12.6 G/DL (ref 14–18)
LYMPHOCYTES ABSOLUTE: 1.1 K/UL (ref 1–4.8)
LYMPHOCYTES RELATIVE PERCENT: 16.6 %
MCH RBC QN AUTO: 29.9 PG (ref 27–31.3)
MCHC RBC AUTO-ENTMCNC: 33.3 % (ref 33–37)
MCV RBC AUTO: 89.8 FL (ref 80–100)
MONOCYTES ABSOLUTE: 0.6 K/UL (ref 0.2–0.8)
MONOCYTES RELATIVE PERCENT: 9.5 %
NEUTROPHILS ABSOLUTE: 4.3 K/UL (ref 1.4–6.5)
NEUTROPHILS RELATIVE PERCENT: 66.7 %
PDW BLD-RTO: 14.8 % (ref 11.5–14.5)
PLATELET # BLD: 235 K/UL (ref 130–400)
POTASSIUM SERPL-SCNC: 4.3 MEQ/L (ref 3.5–5.1)
RBC # BLD: 4.22 M/UL (ref 4.7–6.1)
SODIUM BLD-SCNC: 139 MEQ/L (ref 132–144)
TOTAL PROTEIN: 5 G/DL (ref 6.4–8.1)
WBC # BLD: 6.5 K/UL (ref 4.8–10.8)

## 2018-06-25 PROCEDURE — 80053 COMPREHEN METABOLIC PANEL: CPT

## 2018-06-25 PROCEDURE — 6370000000 HC RX 637 (ALT 250 FOR IP): Performed by: INTERNAL MEDICINE

## 2018-06-25 PROCEDURE — 97110 THERAPEUTIC EXERCISES: CPT

## 2018-06-25 PROCEDURE — 1180000000 HC REHAB R&B

## 2018-06-25 PROCEDURE — 93005 ELECTROCARDIOGRAM TRACING: CPT

## 2018-06-25 PROCEDURE — 97112 NEUROMUSCULAR REEDUCATION: CPT

## 2018-06-25 PROCEDURE — 97530 THERAPEUTIC ACTIVITIES: CPT

## 2018-06-25 PROCEDURE — 97535 SELF CARE MNGMENT TRAINING: CPT

## 2018-06-25 PROCEDURE — 97116 GAIT TRAINING THERAPY: CPT

## 2018-06-25 PROCEDURE — 85025 COMPLETE CBC W/AUTO DIFF WBC: CPT

## 2018-06-25 PROCEDURE — 36415 COLL VENOUS BLD VENIPUNCTURE: CPT

## 2018-06-25 PROCEDURE — 6370000000 HC RX 637 (ALT 250 FOR IP): Performed by: PHYSICAL MEDICINE & REHABILITATION

## 2018-06-25 RX ORDER — DOCUSATE SODIUM 100 MG/1
100 CAPSULE, LIQUID FILLED ORAL DAILY
Status: DISCONTINUED | OUTPATIENT
Start: 2018-06-26 | End: 2018-06-30 | Stop reason: HOSPADM

## 2018-06-25 RX ADMIN — AMIODARONE HYDROCHLORIDE 200 MG: 200 TABLET ORAL at 08:26

## 2018-06-25 RX ADMIN — TIMOLOL MALEATE 1 DROP: 5 SOLUTION OPHTHALMIC at 21:55

## 2018-06-25 RX ADMIN — APIXABAN 5 MG: 5 TABLET, FILM COATED ORAL at 08:26

## 2018-06-25 RX ADMIN — OXYBUTYNIN CHLORIDE 2.5 MG: 5 TABLET ORAL at 12:56

## 2018-06-25 RX ADMIN — BRIMONIDINE TARTRATE 1 DROP: 2 SOLUTION OPHTHALMIC at 21:55

## 2018-06-25 RX ADMIN — METOPROLOL SUCCINATE 25 MG: 25 TABLET, EXTENDED RELEASE ORAL at 08:26

## 2018-06-25 RX ADMIN — FUROSEMIDE 20 MG: 20 TABLET ORAL at 08:25

## 2018-06-25 RX ADMIN — POTASSIUM CHLORIDE 20 MEQ: 20 TABLET, EXTENDED RELEASE ORAL at 12:56

## 2018-06-25 RX ADMIN — OXYBUTYNIN CHLORIDE 2.5 MG: 5 TABLET ORAL at 08:25

## 2018-06-25 RX ADMIN — APIXABAN 5 MG: 5 TABLET, FILM COATED ORAL at 21:52

## 2018-06-25 RX ADMIN — BRIMONIDINE TARTRATE 1 DROP: 2 SOLUTION OPHTHALMIC at 08:38

## 2018-06-25 RX ADMIN — POTASSIUM CHLORIDE 20 MEQ: 20 TABLET, EXTENDED RELEASE ORAL at 17:32

## 2018-06-25 RX ADMIN — Medication: at 21:52

## 2018-06-25 RX ADMIN — LATANOPROST 1 DROP: 50 SOLUTION OPHTHALMIC at 21:53

## 2018-06-25 RX ADMIN — SPIRONOLACTONE 12.5 MG: 25 TABLET, FILM COATED ORAL at 08:27

## 2018-06-25 RX ADMIN — Medication: at 08:29

## 2018-06-25 RX ADMIN — Medication: at 12:56

## 2018-06-25 RX ADMIN — MUPIROCIN: 20 OINTMENT TOPICAL at 08:29

## 2018-06-25 RX ADMIN — OXYBUTYNIN CHLORIDE 2.5 MG: 5 TABLET ORAL at 21:53

## 2018-06-25 RX ADMIN — POTASSIUM CHLORIDE 20 MEQ: 20 TABLET, EXTENDED RELEASE ORAL at 08:26

## 2018-06-25 RX ADMIN — LOSARTAN POTASSIUM 25 MG: 25 TABLET ORAL at 08:26

## 2018-06-25 RX ADMIN — Medication: at 21:53

## 2018-06-25 RX ADMIN — TIMOLOL MALEATE 1 DROP: 5 SOLUTION OPHTHALMIC at 08:38

## 2018-06-25 ASSESSMENT — PAIN SCALES - GENERAL
PAINLEVEL_OUTOF10: 0
PAINLEVEL_OUTOF10: 0

## 2018-06-25 NOTE — PROGRESS NOTES
Pt complaining of having to void frequently this iman and afraid he cannot sleep because he is voiding every 1-2 hours. Urine was sent to lab as ordered. [place] catheter applied to pt and bhatti drainage bag attached so pt can get some sleep and also not be incontinent through night. Pt has excoriated groin as well . Will keep dry tonight and apply ET mix to groin. Pt a little confused as he thought it was morning. Pt reoriented and call bell in reach and bed alarm is on. Bed is gatched , legs are elevated. Lower legs are swollen 3+ to 4+ edema and oriana in color. Pt states he wanted something to help  Him rest. Tylenol 325mg given x2 po at 2253 per order. Will monitor pt and [place] catheter tonight. Pt satisfied and light turned down low. Pt wants tv on.  KAI DEE

## 2018-06-25 NOTE — PROGRESS NOTES
Physical Therapy Rehab Treatment Note  Facility/Department: [facility]  Room: O692/E419-92       NAME: Gage Sanders  : 3/7/1931 (80 y.o.)  MRN: [id number]  CODE STATUS: Full Code    Date of Service: 2018  Chart Reviewed: Yes  Family / Caregiver Present: Yes  Diagnosis: Pneumonia and altered cardiopulonary with generalized weakness    Restrictions:  Restrictions/Precautions: Fall Risk    SUBJECTIVE: Subjective: Pts wife states pt uses cane at home and thinks he will be ok to go home with just the cane. States they have a [employer] also if he needs it. States he may go home on  instead of scheduled D/C . Pain Screening  Patient Currently in Pain: No     Post Treatment Pain Screenin/10     OBJECTIVE: Educated pt's wife on car transfer technique and need for [employer] for safest ambulation at this time. Follows Commands: Within Functional Limits      Transfers  Sit to Stand: Stand by assistance  Stand to sit: Stand by assistance  Bed to Chair: Stand by assistance  Car Transfer: Contact guard assistance  Comment: Educated wife and demo'd safest technique with car transfer. Ambulation  Ambulation?: Yes  Ambulation 1  Surface: carpet  Device: Rolling Walker  Assistance: Stand by assistance  Quality of Gait: Slow pace. VCs to increase posture. Improved step length noted. Distance: 80 feet  Comments: Focused gait training on improving posture and step length. Ambulation 2  Surface - 2: carpet  Device 2: Single point cane  Assistance 2: Contact guard assistance  Quality of Gait 2: Very short step length and decreased posture. Distance: 20ft         Exercises  Hip Flexion: seated x20  Hip Abduction: add with ball, abd manual resist x20   Knee Long Arc Quad: x20     ASSESSMENT/COMMENTS:  Assessment: Gait safest with WW at this time. Decreased assist with st cane today vs last week but decreased distance and increased deviations. Pt's wife verbilizes understanding of eduation.       PLAN OF CARE/Safety:   Plan Comment: cont current POC    Therapy Time:   Individual   Time In 1330   Time Out 1400   Minutes 30     Minutes:      Transfer/Bed mobility trainin      Gait training:15      Neuro re education:     Therapeutic ex:Orion Khanna PTA, 18 at 3:10 PM

## 2018-06-25 NOTE — PROGRESS NOTES
Pt has woken up couple times and is confused. Does not know where he is and is worried. Pt reoriented and reassured. [address] cath came off x2. New device applied to tip of penis that is an exterior catheter. Attempting to keep pt dry and let him rest. Awaiting urine results. ET mix applied to groin.  VMORRIS RN

## 2018-06-25 NOTE — PROGRESS NOTES
Pt had EKG as ordered this am. Showed NSR. Two EKG's were ordered this am so one was cancelled. Results are in blue chart and on computer.  VMORRIS RN

## 2018-06-25 NOTE — PROGRESS NOTES
minutes per day, 5-7 days per week  Plan weeks: 8-12 days   Current Treatment Recommendations: Strengthening, ROM, Balance Training, Endurance Training, Functional Mobility Training, Neuromuscular Re-education, Safety Education & Training, Equipment Evaluation, Education, & procurement, Patient/Caregiver Education & Training, Home Management Training, Self-Care / ADL  Plan Comment: Continue POC    Goals  Patient Goals   Patient goals : \"I want to do things myself. \" \"I used to shower standing. \"        Therapy Time   Individual Concurrent Group Co-treatment   Time In 1100         Time Out 1200         [facility]    Electronically signed by GENTRY Gold/L on 6/26/2018 at 4:09 PM

## 2018-06-25 NOTE — PROGRESS NOTES
Subjective: The patient complains of moderate  acute pain relieved by rest and exacerbated by activity. I am concerned about patients fatigue. ROS x10: The patient also complains of severely impaired mobility and activities of daily living. Otherwise no new problems with vision, hearing, nose, mouth, throat, dermal, cardiovascular, GI, , pulmonary, musculoskeletal, psychiatric or neurological. See Rehab H&P on Rehab chart dated . Vital signs:  /71   Pulse 69   Temp 98 °F (36.7 °C)   Resp 16   Ht 5' 6\" (1.676 m)   Wt 182 lb 15.7 oz (83 kg)   SpO2 97%   BMI 29.53 kg/m²   I/O:   PO/Intake:  fair PO intake, no problems observed or reported. Bowel/Bladder:  continent, no problems noted. General:  Patient is well developed, adequately nourished, non-obese and     well kempt. HEENT:    PERRLA, hearing intact to loud voice, external inspection of ear     and nose benign. Inspection of lips, tongue and gums benign  Musculoskeletal: No significant change in strength or tone. All joints stable. Inspection and palpation of digits and nails show no clubbing,       cyanosis or inflammatory conditions. Neuro/Psychiatric: Affect: flat but pleasant. Alert and oriented to person, place and     situation. No significant change in deep tendon reflexes or     sensation  Lungs:  CTA-B. Respiration effort is normal at rest.     Heart:   S1 = S2, RRR. No loud murmurs. Abdomen:  Soft, non-tender, no enlargement of liver or spleen. Extremities:  No significant lower extremity edema or tenderness. Skin:   Intact to general survey, no visualized or palpated problems.     Rehabilitation:  Physical therapy: FIMS:  Bed Mobility: Scooting: Contact guard assistance    Transfers: Sit to Stand: Stand by assistance  Stand to sit: Stand by assistance  Bed to Chair: Stand by assistance, Ambulation 1  Surface: carpet  Device: Rolling Walker  Other Apparatus: Wheelchair follow  Assistance: Stand Platelets 250 069 - 638 K/uL    Neutrophils % 66.7 %    Lymphocytes % 16.6 %    Monocytes % 9.5 %    Eosinophils % 6.3 %    Basophils % 0.9 %    Neutrophils # 4.3 1.4 - 6.5 K/uL    Lymphocytes # 1.1 1.0 - 4.8 K/uL    Monocytes # 0.6 0.2 - 0.8 K/uL    Eosinophils # 0.4 0.0 - 0.7 K/uL    Basophils # 0.1 0.0 - 0.2 K/uL   Comprehensive Metabolic Panel    Collection Time: 06/25/18  5:23 AM   Result Value Ref Range    Sodium 139 132 - 144 mEq/L    Potassium 4.3 3.5 - 5.1 mEq/L    Chloride 99 98 - 107 mEq/L    CO2 30 (H) 22 - 29 mEq/L    Anion Gap 10 7 - 13 mEq/L    Glucose 82 74 - 109 mg/dL    BUN 16 8 - 23 mg/dL    CREATININE 1.08 0.70 - 1.20 mg/dL    GFR Non-African American >60.0 >60    GFR  >60.0 >60    Calcium 8.5 (L) 8.6 - 10.2 mg/dL    Total Protein 5.0 (L) 6.4 - 8.1 g/dL    Alb 2.7 (L) 3.9 - 4.9 g/dL    Total Bilirubin 0.8 0.0 - 1.2 mg/dL    Alkaline Phosphatase 63 35 - 104 U/L    ALT 18 0 - 41 U/L    AST 20 0 - 40 U/L    Globulin 2.3 2.3 - 3.5 g/dL       Xr Chest Standard (2 Vw)    Result Date: 6/19/2018  EXAMINATION: XR CHEST (2 VW) CLINICAL HISTORY: 80year-old with congestive heart failure and pulmonary symptoms COMPARISONS: Chest x-ray 6/17/2018 FINDINGS: Frontal and lateral views the chest were obtained. Cardiac silhouette is stable at the upper limits of normal in size. There is atherosclerotic calcification in the aortic arch. Superior mediastinum is not significantly widened. Pulmonary vascularity is not distended. Again demonstrated are small bilateral pleural effusions with bibasilar atelectasis/infiltrate or edema stable to slightly improved when compared to the previous study. Upper lung zones are clear. No plain film signs of pneumothorax. Patient is status post kyphoplasty of several lower thoracic/upper lumbar vertebral bodies.  Bony thorax is stable     SMALL PLEURAL EFFUSIONS WITH BIBASILAR ATELECTASIS/INFILTRATE OR EDEMA STABLE TO SLIGHTLY IMPROVED COMPARED TO THE PREVIOUS bedside therapies prn.   2. Bowel and Bladder dysfunction:  frequent toileting, ambulate to bathroom with assistance, check post void residuals. Check for C.difficile x1 if >2 loose stools in 24 hours, continue bowel & bladder program.  Monitor bowel and bladder function. Lactinex 2 PO every AC. MOM prn, Brown Bomb prn, Glycerin suppository prn, enema prn. 3. Moderate generalized OA pain: reassess pain every shift and prior to and after each therapy session, give prn Tylenol and  , modalities prn in therapy, Lidoderm, K-pad prn.   4. Skin healing and breakdown risk:  continue pressure relief program.  Daily skin exams and reports from nursing. 5. Fatigue due to nutritional and hydration deficiency:  continue to monitor I&Os, calorie counts prn, dietary consult prn.  6. 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 7. Falls risk elevated:  patient to use call light to get nursing assistance to get up, bed and chair alarm. 8. Elevated DVT risk: progressive activities in PT, continue prophylaxis JANET hose, elevation and   .  9. Complex discharge planning:  Weekly team meeting every Monday to assess progress towards goals, discuss and address social, psychological and medical comorbidities and to address difficulties they may be having progressing in therapy. Patient and family education is in progress. The patient is to follow-up with their family physician after discharge.         Complex Active General Medical Issues that complicate care Assess & Plan:    Patient Active Problem List   Diagnosis    Fistula    Atrial fibrillation ([facility] [address]    Benign prostatic hyperplasia    Hypertension    Compression fracture of vertebra with routine healing    H/O total knee replacement    Atrial fibrillation with RVR (HCC)    Acute systolic CHF (congestive heart failure), NYHA class 4 (HCC)    Essential hypertension    Generalized weakness    Pneumonia   see staffing note  Scarlett Becerril D.O., PM&R     Attending    [address]

## 2018-06-26 ENCOUNTER — APPOINTMENT (OUTPATIENT)
Dept: GENERAL RADIOLOGY | Age: 83
DRG: 177 | End: 2018-06-26
Attending: PHYSICAL MEDICINE & REHABILITATION
Payer: MEDICARE

## 2018-06-26 LAB
ANION GAP SERPL CALCULATED.3IONS-SCNC: 13 MEQ/L (ref 7–13)
BUN BLDV-MCNC: 16 MG/DL (ref 8–23)
CALCIUM SERPL-MCNC: 8.9 MG/DL (ref 8.6–10.2)
CHLORIDE BLD-SCNC: 95 MEQ/L (ref 98–107)
CO2: 29 MEQ/L (ref 22–29)
CREAT SERPL-MCNC: 1.14 MG/DL (ref 0.7–1.2)
GFR AFRICAN AMERICAN: >60
GFR NON-AFRICAN AMERICAN: >60
GLUCOSE BLD-MCNC: 83 MG/DL (ref 74–109)
POTASSIUM SERPL-SCNC: 4.1 MEQ/L (ref 3.5–5.1)
SODIUM BLD-SCNC: 137 MEQ/L (ref 132–144)
URINE CULTURE, ROUTINE: NORMAL

## 2018-06-26 PROCEDURE — 80048 BASIC METABOLIC PNL TOTAL CA: CPT

## 2018-06-26 PROCEDURE — 2500000003 HC RX 250 WO HCPCS: Performed by: PHYSICAL MEDICINE & REHABILITATION

## 2018-06-26 PROCEDURE — 99232 SBSQ HOSP IP/OBS MODERATE 35: CPT | Performed by: PHYSICAL MEDICINE & REHABILITATION

## 2018-06-26 PROCEDURE — 97112 NEUROMUSCULAR REEDUCATION: CPT

## 2018-06-26 PROCEDURE — 6370000000 HC RX 637 (ALT 250 FOR IP): Performed by: PHYSICAL MEDICINE & REHABILITATION

## 2018-06-26 PROCEDURE — 92611 MOTION FLUOROSCOPY/SWALLOW: CPT

## 2018-06-26 PROCEDURE — 1180000000 HC REHAB R&B

## 2018-06-26 PROCEDURE — 97116 GAIT TRAINING THERAPY: CPT

## 2018-06-26 PROCEDURE — 97110 THERAPEUTIC EXERCISES: CPT

## 2018-06-26 PROCEDURE — 97535 SELF CARE MNGMENT TRAINING: CPT

## 2018-06-26 PROCEDURE — 6370000000 HC RX 637 (ALT 250 FOR IP): Performed by: INTERNAL MEDICINE

## 2018-06-26 PROCEDURE — 74230 X-RAY XM SWLNG FUNCJ C+: CPT

## 2018-06-26 PROCEDURE — 36415 COLL VENOUS BLD VENIPUNCTURE: CPT

## 2018-06-26 RX ORDER — CINNAMON
1 OIL (ML) MISCELLANEOUS 4 TIMES DAILY
Status: DISCONTINUED | OUTPATIENT
Start: 2018-06-26 | End: 2018-06-30 | Stop reason: HOSPADM

## 2018-06-26 RX ORDER — OXYMETAZOLINE HYDROCHLORIDE 0.05 G/100ML
2 SPRAY NASAL 2 TIMES DAILY
Status: DISCONTINUED | OUTPATIENT
Start: 2018-06-26 | End: 2018-06-30 | Stop reason: HOSPADM

## 2018-06-26 RX ADMIN — Medication: at 17:10

## 2018-06-26 RX ADMIN — OXYBUTYNIN CHLORIDE 2.5 MG: 5 TABLET ORAL at 20:33

## 2018-06-26 RX ADMIN — LOSARTAN POTASSIUM 25 MG: 25 TABLET ORAL at 08:22

## 2018-06-26 RX ADMIN — BARIUM SULFATE 50 ML: 400 SUSPENSION ORAL at 14:25

## 2018-06-26 RX ADMIN — METOPROLOL SUCCINATE 25 MG: 25 TABLET, EXTENDED RELEASE ORAL at 08:22

## 2018-06-26 RX ADMIN — MUPIROCIN: 20 OINTMENT TOPICAL at 08:26

## 2018-06-26 RX ADMIN — POTASSIUM CHLORIDE 20 MEQ: 20 TABLET, EXTENDED RELEASE ORAL at 17:10

## 2018-06-26 RX ADMIN — DOCUSATE SODIUM 100 MG: 100 CAPSULE, LIQUID FILLED ORAL at 08:20

## 2018-06-26 RX ADMIN — FUROSEMIDE 20 MG: 20 TABLET ORAL at 08:21

## 2018-06-26 RX ADMIN — APIXABAN 5 MG: 5 TABLET, FILM COATED ORAL at 20:34

## 2018-06-26 RX ADMIN — BRIMONIDINE TARTRATE 1 DROP: 2 SOLUTION OPHTHALMIC at 20:34

## 2018-06-26 RX ADMIN — TIMOLOL MALEATE 1 DROP: 5 SOLUTION OPHTHALMIC at 20:34

## 2018-06-26 RX ADMIN — POTASSIUM CHLORIDE 20 MEQ: 20 TABLET, EXTENDED RELEASE ORAL at 08:20

## 2018-06-26 RX ADMIN — TIMOLOL MALEATE 1 DROP: 5 SOLUTION OPHTHALMIC at 08:25

## 2018-06-26 RX ADMIN — OXYBUTYNIN CHLORIDE 2.5 MG: 5 TABLET ORAL at 15:04

## 2018-06-26 RX ADMIN — POTASSIUM CHLORIDE 20 MEQ: 20 TABLET, EXTENDED RELEASE ORAL at 11:52

## 2018-06-26 RX ADMIN — LATANOPROST 1 DROP: 50 SOLUTION OPHTHALMIC at 20:34

## 2018-06-26 RX ADMIN — APIXABAN 5 MG: 5 TABLET, FILM COATED ORAL at 08:20

## 2018-06-26 RX ADMIN — ACETAMINOPHEN 650 MG: 325 TABLET ORAL at 23:53

## 2018-06-26 RX ADMIN — OXYBUTYNIN CHLORIDE 2.5 MG: 5 TABLET ORAL at 08:21

## 2018-06-26 RX ADMIN — BRIMONIDINE TARTRATE 1 DROP: 2 SOLUTION OPHTHALMIC at 08:24

## 2018-06-26 RX ADMIN — BARIUM SULFATE 80 G: 0.81 POWDER, FOR SUSPENSION ORAL at 14:25

## 2018-06-26 RX ADMIN — Medication: at 08:26

## 2018-06-26 RX ADMIN — AMIODARONE HYDROCHLORIDE 200 MG: 200 TABLET ORAL at 08:20

## 2018-06-26 RX ADMIN — SPIRONOLACTONE 12.5 MG: 25 TABLET, FILM COATED ORAL at 08:21

## 2018-06-26 RX ADMIN — Medication: at 20:35

## 2018-06-26 ASSESSMENT — PAIN SCALES - GENERAL
PAINLEVEL_OUTOF10: 1
PAINLEVEL_OUTOF10: 0

## 2018-06-26 NOTE — PROGRESS NOTES
Physical Therapy Rehab Treatment Note  Facility/Department: [facility]  Room: Y469/N087-88       NAME: Binh Sanchez  : 3/7/1931 (80 y.o.)  MRN: [id number]  CODE STATUS: Full Code    Date of Service: 2018       Restrictions:  Restrictions/Precautions: Fall Risk    SUBJECTIVE: Subjective: Pt states he choked on his lunch today. Pain Screening  Patient Currently in Pain: No     Post Treatment Pain Screenin/10     OBJECTIVE:      Transfers  Sit to Stand: Supervision  Stand to sit: Supervision  Bed to Chair: Supervision  Car Transfer: Minimal Assistance    Ambulation  Ambulation?: Yes  Ambulation 1  Surface: carpet  Device: Rolling Walker  Assistance: Stand by assistance  Quality of Gait: Flexed trunk and downward gaze with verbal and tactile throughout to improve posture. Distance: 100ft    Activity Tolerance  Activity Tolerance: Patient Tolerated treatment well;Patient limited by fatigue    Exercises  Hip Flexion: seated x20  Knee Long Arc Quad: x20  Neurodevelopmental Techniques: Standing functional balance activities with 0-1 UE support. SBA  Comments: shoulder shrugs, scap retraction, retro shoulder rolls x10       ASSESSMENT/COMMENTS:  Assessment: Pt with increased gait distance.       PLAN OF CARE/Safety:   Plan Comment: cont current POC    Therapy Time:   Individual   Time In 1330   Time Out 1400   Minutes 30     Minutes:      Transfer/Bed mobility trainin      Gait training:10      Neuro re education:10     Therapeutic ex:5      Shalom Fernández PTA, 18 at 1:58 PM

## 2018-06-26 NOTE — PROGRESS NOTES
Occupational Therapy  Facility/Department: [facility]  Daily Treatment Note  NAME: Binh Sanchez  : 3/7/1931  MRN: [id number]    Date of Service: 2018    Discharge Recommendations:  Continue to assess pending progress       Patient Diagnosis(es): There were no encounter diagnoses. has a past medical history of Atrial fibrillation ([address] BPH (benign prostatic hypertrophy); Fistula; Glaucoma; and Hypertension. has a past surgical history that includes AV fistula repair and Cholecystectomy. Restrictions  Restrictions/Precautions  Restrictions/Precautions: Fall Risk  Required Braces or Orthoses?: No  Subjective   General  Chart Reviewed: Yes  Patient assessed for rehabilitation services?: Yes  Family / Caregiver Present: No  Referring Practitioner: Dr. Kenny Wheeler  Diagnosis: Pneumonia and alterned cardiopulmonary with generalized weakness     Patient reported min pain in his abdomen that did not impact therapy. Orientation     Objective    Patient was seen with his wife present. Patient reviewed use of adaptive equipment for LE dressing. Patient was additionally educated in use of the dressing stick, long shoe horn and elastic laces. Patient was able to doff and don his socks and shoes with verbal instructions and reminders but no physical assist. Equipment and vendor list was issued in case patient decides that he would like to obtain it for home use. Wife was also educated in walker basket due to recommendation that patient use a walker at home for safety. Wife reported that she did not think that patient would need it but further education was provided that patient cannot carry items in his hand when using the walker. Patient was talking about taking the cover off the air conditioner and turning it on when in the yard. Reminded patient that he needs to be safe and use the walker at home. Blocks were placed on the floor and patient was asked to pick them up using the reachers.  Patient was able to  some and then stated that he was done. Assessment   Assessment: Pt. demonstrates G- safety during room mobility. Pt. continues to require cues for sequencing and thoroughness at times. Pt. continues to benefit from skilled OT to address these deficits and improve independence with ADLs and IADLs. Activity Tolerance  Activity Tolerance: Patient Tolerated treatment well  Safety Devices  Safety Devices in place: Yes  Type of devices: All fall risk precautions in place          Plan   Plan  Times per week:  minutes per day, 5-7 days per week  Plan weeks: 8-12 days   Current Treatment Recommendations: Strengthening, ROM, Balance Training, Endurance Training, Functional Mobility Training, Neuromuscular Re-education, Safety Education & Training, Equipment Evaluation, Education, & procurement, Patient/Caregiver Education & Training, Home Management Training, Self-Care / ADL  Plan Comment: Continue POC    Goals  Patient Goals   Patient goals : \"I want to do things myself. \" \"I used to shower standing. \"        Therapy Time   Individual Concurrent Group Co-treatment   Time In 7903         Time Out 1600         Minutes [address] GENTRY Reeder/L    Electronically signed by GENTRY Taylor/L on 6/26/2018 at 4:31 PM

## 2018-06-26 NOTE — PROGRESS NOTES
Occupational Therapy  Facility/Department: [facility]  Daily Treatment Note  NAME: Jeff Agrawal  : 3/7/1931  MRN: [id number]    Date of Service: 2018    Discharge Recommendations:  Continue to assess pending progress       Patient Diagnosis(es): There were no encounter diagnoses. has a past medical history of Atrial fibrillation ([facility] [address] BPH (benign prostatic hypertrophy); Fistula; Glaucoma; and Hypertension. has a past surgical history that includes AV fistula repair and Cholecystectomy. Restrictions  Restrictions/Precautions  Restrictions/Precautions: Fall Risk  Required Braces or Orthoses?: No  Subjective   General  Chart Reviewed: Yes  Patient assessed for rehabilitation services?: Yes  Family / Caregiver Present: No  Referring Practitioner: Dr. Kai Tamayo  Diagnosis: Pneumonia and alterned cardiopulmonary with generalized weakness     Patient reported no pain  Orientation     Objective    ADL  Feeding: Modified independent   Grooming: Setup  UE Bathing: Supervision (Required encouragement and verbal cues)  LE Bathing: Supervision  UE Dressing: Setup  LE Dressing: Minimal assistance (Required min assist to don shoes. Patient used adaptive equipment for LE dressing)  Toileting: Stand by assistance (Patient stood to urinate. patient needed to be encouraged to manage own pants because he stood and waited for therapist to pull pants down. With encouragement patient was independent)  Additional Comments: Pt required increased time to complete bathing and grooming tasks. Patient declined a shower on this date and performed a sponge bath at the sink. Patient was educated in use of sock aid and reachers to increase independence in self care. Patient was very open to the education and was interested in the equipment.  Patient will benefit from continued practice        Standing Balance  Sit to stand: Supervision  Stand to sit: Supervision  Functional Mobility  Functional - Mobility Device: Rolling

## 2018-06-26 NOTE — PROGRESS NOTES
Occupational Therapy  Facility/Department: [facility]  Daily Treatment Note  NAME: Janessa Presley  : 3/7/1931  MRN: [id number]    Date of Service: 2018    Discharge Recommendations:  Continue to assess pending progress       Patient Diagnosis(es): There were no encounter diagnoses. has a past medical history of Atrial fibrillation ([address] BPH (benign prostatic hypertrophy); Fistula; Glaucoma; and Hypertension. has a past surgical history that includes AV fistula repair and Cholecystectomy. Restrictions  Restrictions/Precautions  Restrictions/Precautions: Fall Risk  Required Braces or Orthoses?: No  Subjective   General  Chart Reviewed: Yes  Patient assessed for rehabilitation services?: Yes  Family / Caregiver Present: No  Referring Practitioner: Dr. Celine Caal  Diagnosis: Pneumonia and alterned cardiopulmonary with generalized weakness   Pre Treatment Pain Screening  Pain at present: 0  Scale Used: Numeric Score  Intervention List: Patient able to continue with treatment;Patient declined any intervention  Pain Assessment  Patient Currently in Pain: No  Pain Assessment: 0-10  Pain Level: 0  Vital Signs  Patient Currently in Pain: No   Orientation     Objective    Therapist answered call light; pt. states that he had been incontinent and needed help to change. Pt. stood as below and ambulated to bathroom with CGA. Pt. pushed pants down independently at commode and sat as below. Pt. was able to perform hygiene with premoistened wipe and shift weight to allow therapist to apply cream. Pt. was able to stand with SBA for therapist to apply brief. Pt. required assist to pull pants up to knees and then was able to pull the rest of the way, but required A to pull shirt out of the waist band in order to pull up and tie pants. Pt. returned to bed at status below.        Standing Balance  Sit to stand: Supervision  Stand to sit: Supervision  Toilet Transfers  Toilet - Technique: Ambulating  Equipment Used: Paradise Spear ashlyn [facility], [facility])  Toilet Transfer: Stand by assistance  Toilet Transfers Comments: with [employer] and wall mount grab bar, Min verbal cues for safety. Bed mobility  Sit to Supine: Modified independent  Comment: Increased time and effort. min A in the bed to shift sideways to even out hips. Transfers  Sit to stand: Supervision  Stand to sit: Supervision     Assessment   Assessment: Pt. demonstrates G- safety during room mobility. Pt. continues to require cues for sequencing and thoroughness at times. Pt. continues to benefit from skilled OT to address these deficits and improve independence with ADLs and IADLs. Activity Tolerance  Activity Tolerance: Patient Tolerated treatment well  Safety Devices  Safety Devices in place: Yes  Type of devices: All fall risk precautions in place          Plan   Plan  Times per week:  minutes per day, 5-7 days per week  Plan weeks: 8-12 days   Current Treatment Recommendations: Strengthening, ROM, Balance Training, Endurance Training, Functional Mobility Training, Neuromuscular Re-education, Safety Education & Training, Equipment Evaluation, Education, & procurement, Patient/Caregiver Education & Training, Home Management Training, Self-Care / ADL  Plan Comment: Continue POC  G-Code  OutComes Score  AM-PAC Score  Goals  Patient Goals   Patient goals : \"I want to do things myself. \" \"I used to shower standing. \"        Therapy Time   Individual Concurrent Group Co-treatment   Time In 1240         Time Out 1250         Minutes [address] GENTRY Menard/L Electronically signed by GENTRY Sow/L on 6/26/2018 at 3:24 PM

## 2018-06-26 NOTE — PROGRESS NOTES
and oriented to person, place and time, in no acute distress, but appears fatigued. Cardiovascular: normal rate, regular rhythm, normal S1 and S2, I/VI flow murmurs. No rubs, clicks. NO S3 gallops. no JVD   Pulmonary/Chest: clear to auscultation bilaterally- no wheezes, rales or rhonchi, normal air movement, no respiratory distress  Abdomen: soft, non-tender, non-distended, normal bowel sounds, no masses  Extremities: no cyanosis, clubbing. 2+ edema to 1/2 up calves. Skin: warm and dry, no rash or erythema  Neck: supple and non-tender without mass, no thyromegaly   Neurological: alert, oriented, normal speech, no focal findings or movement disorder noted    Medications:    docusate sodium  100 mg Oral Daily    ammonium lactate   Topical Daily    mupirocin   Topical Daily    spironolactone  12.5 mg Oral Daily    potassium chloride  20 mEq Oral TID WC    amiodarone  200 mg Oral Daily    furosemide  20 mg Oral Daily    losartan  25 mg Oral Daily    metoprolol succinate  25 mg Oral Daily    apixaban  5 mg Oral BID    brimonidine  1 drop Both Eyes BID    And    timolol  1 drop Both Eyes BID    fluticasone  1 spray Each Nare Daily    latanoprost  1 drop Both Eyes Nightly    oxybutynin  2.5 mg Oral TID    nystatin, stomahesive in petrolatum   Topical TID         acetaminophen 650 mg Q4H PRN   ipratropium-albuterol 1 ampule Q4H PRN   magnesium hydroxide 30 mL Daily PRN       Diagnostics:  EKG:  NSR. Non specific stt. QTc 425    Echo: 6/18/2018 (TRISTEN)  1. Moderate global left ventricular dysfunction, ejection fraction 30%  2. Biatrial marked enlargement  3. Moderate mitral and tricuspid regurgitation  4. Moderate pericardial effusion circumferential without obvious Physiology  5. Moderate pleural effusion  6. Normal aorta is visualized. 7. Negative transesophageal echocardiogram for a source of emboli or masses.   8. Positive transesophageal echocardiogram with positive contrast bubble study probable patent foramen ovale. 9. Negative Doppler study for ASD, or VSD. Lab Data:    Cardiac Enzymes:  No results for input(s): CKTOTAL, CKMB, CKMBINDEX, TROPONINI in the last 72 hours. ProBNP:   Lab Results   Component Value Date    PROBNP 1,554 06/22/2018       CBC:   Lab Results   Component Value Date    WBC 6.5 06/25/2018    RBC 4.22 06/25/2018    HGB 12.6 06/25/2018    HCT 37.9 06/25/2018     06/25/2018       CMP:    Lab Results   Component Value Date     06/26/2018    K 4.1 06/26/2018    CL 95 06/26/2018    CO2 29 06/26/2018    BUN 16 06/26/2018    CREATININE 1.14 06/26/2018    GFRAA >60.0 06/26/2018    LABGLOM >60.0 06/26/2018    GLUCOSE 83 06/26/2018    GLUCOSE 84 12/08/2011    CALCIUM 8.9 06/26/2018       Hepatic Function Panel:    Lab Results   Component Value Date    ALKPHOS 63 06/25/2018    ALT 18 06/25/2018    AST 20 06/25/2018    PROT 5.0 06/25/2018    BILITOT 0.8 06/25/2018    LABALBU 2.7 06/25/2018    LABALBU 4.1 12/08/2011       Magnesium:    Lab Results   Component Value Date    MG 1.7 06/20/2018       PT/INR:    Lab Results   Component Value Date    PROTIME 19.7 06/17/2018    PROTIME 60.5 05/17/2018    PROTIME 51.3 05/17/2018    PROTIME 26.7 09/21/2016    INR 1.9 06/17/2018       Principal Problem:    Abnormality of gait and mobility due to Pneumonia and altered cariopulmonary with Generalized Weakness. [facility] admit 06/20/18. Active Problems:    Atrial fibrillation with RVR (HCC)    Acute systolic CHF (congestive heart failure), NYHA class 4 (HCC)    Essential hypertension    Fistula    Atrial fibrillation (HCC)    Benign prostatic hyperplasia    Hypertension    Compression fracture of vertebra with routine healing    H/O total knee replacement    Generalized weakness    Pneumonia    History of cholecystectomy    Glaucoma  Resolved Problems:    * No resolved hospital problems.  *           Electronically signed by Aftab Garcia MD on 6/26/2018 at 12:23 PM

## 2018-06-26 NOTE — PROGRESS NOTES
Subjective: The patient complains of severe  acute  on chronic fatigue secondary to recent pneumonia and cardiopulmonary disease relieved by  PT, OT, rest and exacerbated by  recent pneumonia likely aspiration. I am concerned about patients  Recent A FIB--also he seems to be aspirating his salad. Mya Aquino ROS x10: The patient also complains of severely impaired mobility and activities of daily living. Otherwise no new problems with vision, hearing, nose, mouth, throat, dermal, cardiovascular, GI, , pulmonary, musculoskeletal, psychiatric or neurological. See Rehab H&P on Rehab chart dated . Vital signs:  /72   Pulse 67   Temp 98 °F (36.7 °C) (Oral)   Resp 20   Ht 5' 6\" (1.676 m)   Wt 182 lb 15.7 oz (83 kg)   SpO2 96%   BMI 29.53 kg/m²   I/O:   PO/Intake:  fair PO intake, severe dysphagia    Bowel/Bladder:  continent, no problems noted. General:  Patient is well developed, adequately nourished, non-obese and     well kempt. HEENT:    PERRLA, hearing intact to loud voice, external inspection of ear     and nose benign. Inspection of lips, tongue and gums benign  Musculoskeletal: No significant change in strength or tone. All joints stable. Inspection and palpation of digits and nails show no clubbing,       cyanosis or inflammatory conditions. Neuro/Psychiatric: Affect: flat but pleasant. Alert and oriented to person, place and     situation. No significant change in deep tendon reflexes or     sensation  Lungs:  Diminished, CTA-B. Respiration effort is normal at rest.     Heart:   S1 = S2, RRR. No loud murmurs. Abdomen:  Soft, non-tender, no enlargement of liver or spleen. Extremities:  No significant lower extremity edema or tenderness. Skin:   Intact to general survey, no visualized or palpated problems.     Rehabilitation:  Physical therapy: FIMS:  Bed Mobility: Scooting: Contact guard assistance    Transfers: Sit to Stand: Stand by assistance  Stand to sit: Stand by bathroom with assistance, check post void residuals. Check for C.difficile x1 if >2 loose stools in 24 hours, continue bowel & bladder program.  Monitor bowel and bladder function. Lactinex 2 PO every AC. MOM prn, Brown Bomb prn, Glycerin suppository prn, enema prn.  3. Severe generalized OA pain: reassess pain every shift and prior to and after each therapy session, give prn medications, modalities prn in therapy, Lidoderm, K-pad prn.   4. Skin healing and breakdown risk:  continue pressure relief program.  Daily skin exams and reports from nursing. 5. Nutritional and hydration deficiency:  Patient probably has aspiration pneumonia and he is choking on a solid I will downgraded his diet to mechanical soft with nectar 6 and have a modified barium swallow done as recently which pathology seen continue to monitor I&Os, calorie counts prn, dietary consult prn.  6. 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 7. Falls risk elevated:  patient to use call light to get nursing assistance to get up, bed and chair alarm. 8. Elevated DVT risk: progressive activities in PT, continue prophylaxis JANET hose, elevation and  Elliquis. 9. Complex discharge planning:  Weekly team meeting every Monday to assess progress towards goals, discuss and address social, psychological and medical comorbidities and to address difficulties they may be having progressing in therapy. Patient and family education is in progress. The patient is to follow-up with their family physician after discharge. Complex Active General Medical Issues that complicate care Assess & Plan:     1. Principal Problem:    Abnormality of gait and mobility due to Pneumonia and altered cariopulmonary with Generalized Weakness. [facility] admit 06/20/18. Active Problems:  1.    Acute systolic CHF (congestive heart failure), NYHA class 4, Status post Atrial fibrillation with recent cardioversion

## 2018-06-26 NOTE — PROGRESS NOTES
Physical Therapy Rehab Treatment Note  Facility/Department: [facility]  Room: [facility]/L613-29       NAME: Roxann Hodges  : 3/7/1931 (80 y.o.)  MRN: [id number]  CODE STATUS: Full Code    Date of Service: 2018     Restrictions:  Restrictions/Precautions: Fall Risk    SUBJECTIVE: Subjective: Pt states he did his sponge bath all himself this morning and now he is pooped. Pain Screening  Patient Currently in Pain: No     Post Treatment Pain Screenin/10 pain reported at rest.  Pt stated his knee were \"bothering him\" after completing the steps. OBJECTIVE:              Bed mobility  Rolling to Left: Modified independent  Rolling to Right: Modified independent  Supine to Sit: Modified independent  Sit to Supine: Modified independent  Scooting: Modified independent    Transfers  Sit to Stand: Supervision  Stand to sit: Supervision  Bed to Chair: Supervision  Car Transfer: Minimal Assistance  Comment: Good carryover of technique with car transfer however required minimal assist to stand from car seat. Ambulation  Ambulation?: Yes  Ambulation 1  Surface: carpet  Device: Rolling Walker  Assistance: Stand by assistance  Quality of Gait: Flexed trunk and downward gaze with verbal and tactile throughout to improve posture. VCs to increase step length and foot clearance. Fatigue noted. Distance: 75ft  Stairs/Curb  Stairs?: Yes   Stairs  # Steps : 4  Stairs Height: 6\"  Rails: Bilateral  Assistance: Stand by assistance    Activity Tolerance  Activity Tolerance: Patient Tolerated treatment well;Patient limited by fatigue    Exercises  Heelslides: x10 with glute set at end range to increase hip ext  Hip Flexion: seated x20  Hip Abduction: add with ball, abd manual resist x20 seated. supine x10  Knee Long Arc Quad: x20  Knee Short Arc Quad: 2x10  Ankle Pumps: educated pt to complete AP when legs are elevated to decrease swelling.     Other exercises  Other exercises 1: trunk ext and pec stretching  Other exercises 2: LTR stretch [id number]      ASSESSMENT/COMMENTS:  Assessment: Pt with increased fatigue during gait and increased deviations moted. Unsafe for gait training with st cane.     PLAN OF CARE/Safety:   Plan Comment: cont current POC    Therapy Time:   Individual   Time In 1000   Time Out 1100   Minutes 60     Minutes:      Transfer/Bed mobility training:10      Gait trainin      Neuro re education:     Therapeutic ex:30    Jennifer Snyder PTA, 18 at 10:59 AM

## 2018-06-26 NOTE — PROCEDURES
SPEECH/LANGUAGE PATHOLOGY  VIDEOFLUOROSCOPIC STUDY OF SWALLOWING (MBS)      PATIENT NAME:  Yosvany Esposito      :  3/7/1931    Room: H063/E098-27   TODAY'S DATE:  2018    Time in: 1420 Time out: 1435    [x]The admitting diagnosis and active problem list, as listed below have been reviewed prior to initiation of this evaluation. ADMITTING DIAGNOSIS: Generalized weakness [R53.1]  Generalized weakness [R53.1]  Pneumonia [J18.9]     ACTIVE PROBLEM LIST:   Patient Active Problem List   Diagnosis    Fistula    Atrial fibrillation ([address]    Benign prostatic hyperplasia    Hypertension    Compression fracture of vertebra with routine healing    H/O total knee replacement    [facility]    Acute systolic CHF (congestive heart failure), NYHA class 4 ([facility])    Essential hypertension    Generalized weakness    Pneumonia    History of cholecystectomy    Glaucoma    Abnormality of gait and mobility due to Pneumonia and altered cariopulmonary with Generalized Weakness. [facility] admit 18.        Allergies   Allergen Reactions    Sulfa Antibiotics      SUMMARY OF EVALUATION  DYSPHAGIA DIAGNOSIS: Mild oropharyngeal dysphagia     DIET RECOMMENDATIONS:              [x]  Regular consistency foods with thin consistency liquids      FEEDING RECOMMENDATIONS:   [x] No assistance required   [] Frequent checks by nursing  [] Full assistance required  [] Set up required   [] Supervision with all PO intake    [] Double swallow    [] Chin tuck  [] Multiple swallow     [x] Feed in upright position   [x] Small bites/sips   [] Feed reclined, 45 degrees  [] Alternate solids / liquids  [] Check for oral pocketing  [] No straw    [] Throat clear       [] Place food on left side  [] Place food on right side  [] Spoon sip liquids   [] Effortful swallow  [] Johnson Rumpf water protocol  [x] Eat/drink slowly     [x] Administer meds ([x] whole   [] crushed) with: [x] Water [] Applesauce  [] Administer meds Administered via tsp (5 ml boluses), by cup or straw in single and sequentially swallowed boluses as tolerated. Solid evaluated with 1/2 stacey cracker/3 ml barium pudding coated as tolerated. Consistencies Administered During the Evaluation   Liquids: [x]Thin    []Nectar   []Honey   Solids:  [x]Pureed/Pudding  []Soft Solid   [x]Cookie  Other:       Method of Intake:     [x]Self Fed     []Set-Up     []Fed by Clinician     [x]Cup      [x]Spoon     []Straw                    RESULTS   ORAL STAGE []WNL  []WFL  [x]  Exceptions    [] Inadequate labial seal resulting anterior labial spillage from: []right[] left []midline     [] Decreased mastication due to: []poor/missing dentition []decreased lingual control  []cognitive status    [] Delayed A-P transit due to: []decreased initiation [] reduced lingual strength []cognitive function     [x] Decreased bolus formation resulting in premature pharyngeal spillage to the level of the: [x] valleculae [] pyriforms      [x] Oral residuals: []anterior sulcus  []sub lingually  []right buccal cavity     []left buccal cavity [x]on tongue base      []throughout oral cavity []on superior tongue  []on palate   []on velum        [x]Oral Stage Impression:  Mild oral dysphagia characterized by decreased bolus formation resulting in premature pharyngeal spillage to the level of the valleculae for all consistencies tested and min residuals on base of tongue for all consistencies tested that pt cleared with a spontaneous double swallow.      PHARYNGEAL STAGE:     [] WNL  []WFL  [x]  Exceptions    ONSET TIME:  [] Onset time of the pharyngeal swallow was adequate    [x] Delayed initiation of the pharyngeal swallow:    [x]mild []moderate []marked []severe []absent     [x] Swallow reflex was triggered at:   []tongue base [x]valleculae []pyriform sinus     PHARYNGEAL RESIDUALS        Vallecula/Pharyngeal Wall  []No significant residuals were noted in the vallecula    [x]Reduced tongue base retraction resulting in:    [x]residuals in vallecula []residual on posterior pharyngeal wall    These residuals were noted for: [x]thin []nectar[] honey [x]pureed [x]solid  Which: []cleared  []did not clear  []partially cleared [x]mostly cleared  With:    []cued [x]spontaneous []double swallow [x]multiple swallow (2-3 times)  []liquid chaser      Pyriform Sinuses  []No significant residuals were noted in the pyriform sinuses    [x] Residuals in the pyriform sinuses were noted due to:    [x]pharyngeal weakness []cricopharyngeal dysfunction    These residuals were noted for: [x] thin [] nectar [] honey [x] pureed  [x] solid  Which: [x] cleared, [] did not clear,  [] partially cleared, [] mostly cleared   With:  []cued,  [x] spontaneous, [x]double swallow,  [] multiple swallow (  Times),   [] liquid Chaser    LARYNGEAL PENETRATION/ASPIRATION  []Laryngeal penetration was not present during this evaluation    [x]Aspiration was not present during this evaluation       Stasis   valleculae Pooling  pyriform sinuses LARYNGEAL    PENETRATION ASPIRATION        Trace   Transient   Deep   Before    During    After   Thin    Cup x1        Nectar            Honey             Puree           Soft Solid           Regular  Solid                Response to laryngeal penetration:      []spontaneous cough        [] throat clearing        [] inconsistent/delayed cough      [] absent cough          [x] automatically cleared      Aspiration Scale  Puree, Solid, Thin-cup 1 Material does not enter the airway   Thin-cup x1 2 Material enters the airway, remains above the vocal folds, and is ejected from the airway    3 Material enters the airway, remains above the vocal folds, and is not ejected from the airway    4 Material enters the airway, contacts the vocal folds, an is ejected from the airway    5 Material enters the airway, contacts the vocal folds, and is not ejected from the airway    6 Material enters the airway, passes below the vocal folds and is ejected into the larynx or out of the airway    7 Material enters the airway, passes below the vocal folds, and is not ejected from the trachea despite effort    8 Material enters the airway, passes below the vocal folds, and no effort is made to eject. Compensatory Strategies:  Effective compensatory strategies:     [x] double swallow        [x] multiple swallow     [] alternating solids/liquids     [] chin tuck     [] cued throat clear     [] cued redirective cough    Pharyngeal Stage Impression: Mild oral dysphagia characterized by a mildly delayed initiation of the pharyngeal swallow with the swallow reflex triggered at the level of the valleculae for all consistencies tested. Reduced tongue base retraction resulted in min-mod residuals in the valleculae for all consistencies tested that pt mostly cleared with spontaneous 2-3 swallows. Pharyngeal weakness resulted in min residuals in the pyriform sinuses for all consistencies tested that pt cleared with a spontaneous double swallow. One instance of trace laryngeal penetration observed following a large sip of thin liquid via cup that automatically cleared. No aspiration observed. STRUCTURAL/FUNCTIONAL ANOMALIES    [x]No structural/functional anomalies were noted    []Inadequate velopharyngeal closure resulting in nasopharyngeal reflux.      [] There was presence of Zenkers Diverticulum per Radiologist     []Comments:     CERVICAL ESOPHAGEAL STAGE : [x]Adequate []Inadequate  []Not Assessed     []Cervical osteophytes present per Radiologist     []Structural/mechanical abnormality in cervical esophagus per Radiologist    [] Redirection of bolus from the esophagus into pharynx     Long Term Goals:  [] Will tolerate least restrictive diet safely      []Goals to be determined after MBS      [x] No Treatment indicated at this time as pt refused dysphagia therapy     Pain:none, N/A   []Nursing notified    Radiologist:  Available on Consult as needed, Radiology Tech    Treatment goals were discussed with the: [x] patient  [] family. The []  patient []  family understand the diagnosis, prognosis and plan of care. [x]  Reinforcement is needed    Thank you for your referral.  Please direct any questions or concerns to Speech Pathology. Images are available through [facility]/PACS. Please see radiologist report for additional details.     Therapist:  Electronically signed by HU Smith on 6/26/2018 at 2:53 PM

## 2018-06-27 LAB
ANION GAP SERPL CALCULATED.3IONS-SCNC: 9 MEQ/L (ref 7–13)
BUN BLDV-MCNC: 18 MG/DL (ref 8–23)
CALCIUM SERPL-MCNC: 9 MG/DL (ref 8.6–10.2)
CHLORIDE BLD-SCNC: 99 MEQ/L (ref 98–107)
CO2: 29 MEQ/L (ref 22–29)
CREAT SERPL-MCNC: 1.15 MG/DL (ref 0.7–1.2)
GFR AFRICAN AMERICAN: >60
GFR NON-AFRICAN AMERICAN: >60
GLUCOSE BLD-MCNC: 79 MG/DL (ref 74–109)
POTASSIUM SERPL-SCNC: 5.1 MEQ/L (ref 3.5–5.1)
SODIUM BLD-SCNC: 137 MEQ/L (ref 132–144)

## 2018-06-27 PROCEDURE — 97530 THERAPEUTIC ACTIVITIES: CPT

## 2018-06-27 PROCEDURE — 6370000000 HC RX 637 (ALT 250 FOR IP): Performed by: PHYSICAL MEDICINE & REHABILITATION

## 2018-06-27 PROCEDURE — 99232 SBSQ HOSP IP/OBS MODERATE 35: CPT | Performed by: PHYSICAL MEDICINE & REHABILITATION

## 2018-06-27 PROCEDURE — 97535 SELF CARE MNGMENT TRAINING: CPT

## 2018-06-27 PROCEDURE — 6370000000 HC RX 637 (ALT 250 FOR IP): Performed by: INTERNAL MEDICINE

## 2018-06-27 PROCEDURE — 97112 NEUROMUSCULAR REEDUCATION: CPT

## 2018-06-27 PROCEDURE — 97110 THERAPEUTIC EXERCISES: CPT

## 2018-06-27 PROCEDURE — 80048 BASIC METABOLIC PNL TOTAL CA: CPT

## 2018-06-27 PROCEDURE — 1180000000 HC REHAB R&B

## 2018-06-27 PROCEDURE — 51798 US URINE CAPACITY MEASURE: CPT

## 2018-06-27 PROCEDURE — 93010 ELECTROCARDIOGRAM REPORT: CPT | Performed by: INTERNAL MEDICINE

## 2018-06-27 PROCEDURE — 97116 GAIT TRAINING THERAPY: CPT

## 2018-06-27 PROCEDURE — 36415 COLL VENOUS BLD VENIPUNCTURE: CPT

## 2018-06-27 RX ORDER — TAMSULOSIN HYDROCHLORIDE 0.4 MG/1
0.4 CAPSULE ORAL DAILY
Status: DISCONTINUED | OUTPATIENT
Start: 2018-06-27 | End: 2018-06-27

## 2018-06-27 RX ORDER — TAMSULOSIN HYDROCHLORIDE 0.4 MG/1
0.4 CAPSULE ORAL 2 TIMES DAILY
Status: DISCONTINUED | OUTPATIENT
Start: 2018-06-27 | End: 2018-06-30 | Stop reason: HOSPADM

## 2018-06-27 RX ADMIN — METOPROLOL SUCCINATE 25 MG: 25 TABLET, EXTENDED RELEASE ORAL at 11:00

## 2018-06-27 RX ADMIN — AMIODARONE HYDROCHLORIDE 200 MG: 200 TABLET ORAL at 10:59

## 2018-06-27 RX ADMIN — OXYBUTYNIN CHLORIDE 2.5 MG: 5 TABLET ORAL at 18:51

## 2018-06-27 RX ADMIN — BRIMONIDINE TARTRATE 1 DROP: 2 SOLUTION OPHTHALMIC at 21:45

## 2018-06-27 RX ADMIN — Medication 1 DROP: at 13:05

## 2018-06-27 RX ADMIN — BRIMONIDINE TARTRATE 1 DROP: 2 SOLUTION OPHTHALMIC at 11:01

## 2018-06-27 RX ADMIN — DOCUSATE SODIUM 100 MG: 100 CAPSULE, LIQUID FILLED ORAL at 10:59

## 2018-06-27 RX ADMIN — SPIRONOLACTONE 12.5 MG: 25 TABLET, FILM COATED ORAL at 10:59

## 2018-06-27 RX ADMIN — LATANOPROST 1 DROP: 50 SOLUTION OPHTHALMIC at 21:45

## 2018-06-27 RX ADMIN — POTASSIUM CHLORIDE 20 MEQ: 20 TABLET, EXTENDED RELEASE ORAL at 18:51

## 2018-06-27 RX ADMIN — Medication: at 18:52

## 2018-06-27 RX ADMIN — TIMOLOL MALEATE 1 DROP: 5 SOLUTION OPHTHALMIC at 21:45

## 2018-06-27 RX ADMIN — Medication 1 DROP: at 18:51

## 2018-06-27 RX ADMIN — OXYBUTYNIN CHLORIDE 2.5 MG: 5 TABLET ORAL at 11:00

## 2018-06-27 RX ADMIN — POTASSIUM CHLORIDE 20 MEQ: 20 TABLET, EXTENDED RELEASE ORAL at 13:01

## 2018-06-27 RX ADMIN — TIMOLOL MALEATE 1 DROP: 5 SOLUTION OPHTHALMIC at 13:00

## 2018-06-27 RX ADMIN — Medication: at 18:53

## 2018-06-27 RX ADMIN — Medication: at 11:02

## 2018-06-27 RX ADMIN — TAMSULOSIN HYDROCHLORIDE 0.4 MG: 0.4 CAPSULE ORAL at 21:44

## 2018-06-27 RX ADMIN — POTASSIUM CHLORIDE 20 MEQ: 20 TABLET, EXTENDED RELEASE ORAL at 10:57

## 2018-06-27 RX ADMIN — Medication 1 DROP: at 11:04

## 2018-06-27 RX ADMIN — LOSARTAN POTASSIUM 25 MG: 25 TABLET ORAL at 10:58

## 2018-06-27 RX ADMIN — OXYBUTYNIN CHLORIDE 2.5 MG: 5 TABLET ORAL at 21:44

## 2018-06-27 RX ADMIN — FUROSEMIDE 20 MG: 20 TABLET ORAL at 10:57

## 2018-06-27 RX ADMIN — MUPIROCIN: 20 OINTMENT TOPICAL at 18:52

## 2018-06-27 ASSESSMENT — PAIN SCALES - GENERAL
PAINLEVEL_OUTOF10: 0
PAINLEVEL_OUTOF10: 0

## 2018-06-27 NOTE — PROGRESS NOTES
Speech Language Pathology    NAME:  Keesha Arenas  ROOM: P605/T234-04  :  3/7/1931  DATE: 2018      Speech Therapy attempted to see Keesha Arenas on this date for a/an:    [x] Bedside Swallow Evaluation   [x] Speech/Language Evaluation   [] Modified Barium Swallow   [] Treatment    Pt was unable to be seen due to patient:   [] Currently off unit   [x] Refused- Pt says he is going home in a few days and does not want to bother with this. He sees no need for speech or swallow intervention. [] Too lethargic to participate    [] NPO for testing   [] On Bipap   [] Nursing Deferred:   [] Other:     Additional comments:  18- Pt participated in Bedside Swallow Eval, Speech-Language Eval, and Cognitive Eval. Pt declined treatment following the evaluations. 18- Pt participated in a Modified Barium Swallow Study. Pt declined treatment following the evaluations. 18- ST attempted to repeat BSE, SLE, and Cognitive Evaluation (per DO orders). Pt declined to participate in repeat evaluations.        Electronically signed by HU Wan on 18 at 3:24 PM

## 2018-06-27 NOTE — PROGRESS NOTES
Physical Therapy Rehab Treatment Note  [facility]  [facility]       NAME: Marck Chu  : 3/7/1931 (80 y.o.)  MRN: [id number]  CODE STATUS: Full Code    Date of Service: 2018  Chart Reviewed: Yes  Family / Caregiver Present: No  Diagnosis: Pneumonia and altered cardiopulonary with generalized weakness    Restrictions:  Restrictions/Precautions: Fall Risk    SUBJECTIVE: Subjective: \"Same ole same ole\"  Pain Screening  Patient Currently in Pain: No     Post Treatment Pain Screenin/10     OBJECTIVE:      Bed mobility  Rolling to Left: Modified independent  Rolling to Right: Modified independent  Sit to Supine: Modified independent    Transfers  Sit to Stand: Modified independent  Stand to sit: Modified independent  Bed to Chair: Modified independent  Car Transfer: Stand by assistance  Comment: Improved ability to stand from car seat requiring SBA only. Good technique. Ambulation  Ambulation?: Yes  Ambulation 1  Surface: carpet  Device: Rolling Walker  Assistance: Supervision  Quality of Gait: Flexed trunk and downward gaze with verbal and tactile throughout to improve posture. Distance: 110ft     Exercises  Neurodevelopmental Techniques: Fwd ambualtion with targets to increase step length.  1 UE support in parallel bars. Standing functional balance tasks in bathroom with 0-1 UE support SBA. ASSESSMENT/COMMENTS:  Assessment: Pt tolerated treatment well today. Overall progressing towards goals. Pt demo'd good safey and indep with transfers and bed mobility. Able to gait train with st cane however WW cont to be safest device for ambulation.      PLAN OF CARE/Safety:   Plan Comment: cont current POC    Therapy Time:   Individual   Time In 1330   Time Out 1400   Minutes 30     Minutes:      Transfer/Bed mobility training:      Gait training:10      Neuro re education:20     Therapeutic ex:    Lloyd Harman PTA, 18 at 2:01 PM

## 2018-06-27 NOTE — PROGRESS NOTES
Occupational Therapy  Facility/Department: [facility]  Daily Treatment Note  NAME: Tuan Carl  : 3/7/1931  MRN: [id number]    Date of Service: 2018    Discharge Recommendations:  Patient would benefit from continued therapy after discharge       Patient Diagnosis(es): There were no encounter diagnoses. has a past medical history of Atrial fibrillation ([address] BPH (benign prostatic hypertrophy); Fistula; Glaucoma; and Hypertension. has a past surgical history that includes AV fistula repair and Cholecystectomy. Restrictions  Restrictions/Precautions  Restrictions/Precautions: Fall Risk  Required Braces or Orthoses?: No    Subjective \"I used to love golfing. \"   General  Chart Reviewed: Yes  Patient assessed for rehabilitation services?: Yes  Response to previous treatment: Patient with no complaints from previous session  Family / Caregiver Present: No  Referring Practitioner: Dr. Pedro Bland  Diagnosis: Pneumonia and alterned cardiopulmonary with generalized weakness   Pre Treatment Pain Screening  Pain at present: 0  Scale Used: Numeric Score  Intervention List: Patient able to continue with treatment  Pain Assessment  Patient Currently in Pain: No  Pain Assessment: 0-10  Pain Level: 0  Vital Signs  Patient Currently in Pain: No     Orientation x 4. Objective    ADL  Grooming: Supervision (Pt washed B hands in standing with good dynamic standing balance. Supervison for safe FWW use during task.)  Toileting: Modified independent  (Pt managed brief, pants, and hygiene with good balance and safety awareness.)  Instrumental ADL's  Instrumental ADLs: Yes  Light Housekeeping  Light Housekeeping Level of Assistance: Supervision  Light Housekeeping: D/t fatigue, pt completed laundry task seated in chair at table top. Pt completed anterior weight shifts and reached min-mod outside pt's JOSE E with alternating UEs to locate matches for 15 pairs of socks and fold 5 pieces of laundry.  Pt required minimal verbal cues to correctly locate matches in pile. Pt completed task with increased time. Standing Balance  Time: 7-8 minutes x 2 with a seated rest break between d/t fatigue   Activity: Standing to put x 15 on putting green. Pt demo'd good dynamic standing balance for each rep with no LOB. Pt required A to place ball on floor in 100% of trials d/t inability to safely reach floor from standing position. Pt completed 15 reps x 2 sets. Sit to stand: Supervision  Stand to sit: Supervision  Comment: Pt intermittently stabilized self on FWW between reps; pt self initiated steadying and demo'd good safety awareness for corners of putting green. Functional Mobility  Functional - Mobility Device: Rolling Walker  Activity: Retrieve items;Transport items  Assist Level: Supervision  Functional Mobility Comments: Pt completed functional mobility with FWW ~25-30ft to retrieve items from ground level with reacher x 18. Pt required verbal cues to place body \"square\" to item being retrieved and to prevent twising and placing full weight through LUE on FWW while reaching with RUE. Pt reported moderate fatigue with activity and required a seated rest break after task completed. Transfers  Sit to stand: Supervision  Stand to sit: Supervision           Type of ROM/Therapeutic Exercise  Comment: In standing, pt completed task of reaching min-mod outside pt's JOSE E with LUE to retrieve beanbag > place bean bag onto top of B jose david > push jose david up incline plane with 3 lbs resistance > lower jose david down incline plane x 25 reps. After a brief seated rest break, pt completed same activity for an additional 25 reps, but completed reaching outside JOSE E with RUE x 25. Pt demo'd good dynamic standing balance throughout activity. Exercises  Other: Seated in chair, pt completed 10 reps x 1 of shoulder flexion with 3lb weighted margarita to tap tossed beach ball. Pt reported significant fatigue with 3lb weight.  Pt then completed 10 reps x 3 of same task with 1.5lb weighted margarita with a brief rest break between each set. Task completed to increase pt's UB strength and endurance to complete functional mobility with FWW with greater safety and independence. Assessment   Assessment: Dynamic standing balance and endurance are pt's largest barrier to increase independence in ADLs and IADLs. Pt would continue to benefit from skilled OT servcies to address deficits and improve independence and safety in daily living. REQUIRES OT FOLLOW UP: Yes  Activity Tolerance  Activity Tolerance: Patient Tolerated treatment well          Plan   Plan  Times per week:  minutes per day, 5-7 days per week  Plan weeks: 8-12 days   Current Treatment Recommendations: Strengthening, ROM, Balance Training, Endurance Training, Functional Mobility Training, Neuromuscular Re-education, Safety Education & Training, Equipment Evaluation, Education, & procurement, Patient/Caregiver Education & Training, Home Management Training, Self-Care / ADL  Plan Comment: Continue per OT POC     Goals  Patient Goals   Patient goals : \"I want to do things myself. \" \"I used to shower standing. \"        Therapy Time   Individual Concurrent Group Co-treatment   Time In 1103         Time Out 1209         Minutes 77                 Bandar Hanna OT Electronically signed by Bandar Hanna OT on 6/27/2018 at 12:12 PM

## 2018-06-27 NOTE — CONSULTS
[facility] [address]                       [address], [address] [facility]                                   CONSULTATION    PATIENT NAME: Elisabeth Carr                     :        1931  MED REC NO:   [id number]                            ROOM:       P895  ACCOUNT NO:   [de-identified]                           ADMIT DATE: 2018  PROVIDER:     Jarad Brooks DO    CONSULT DATE:  2018    HISTORY OF PRESENT ILLNESS:  This is an 80year-old who is being seen  because of difficulty with swallowing. He has had a swallow assessment and  he was deemed to be able to eat regular food. He does state that on  occasion over the past three to four months where food seems to stick in  his throat and behind his sternum. He denies any hematemesis. He has had  vomiting on occasion with undigested food. He has had some minor weight  loss over the past six months. He is hypertensive and history of organic  heart disease, atrial fibrillation. He has had a previous cholecystectomy. ALLERGIES TO MEDICATIONS:  SULFA. HABITS:  No smoking. He does use alcohol on rare occasions. No opioids or  nonsteroidals. PAST SURGICAL HISTORY:  Cholecystectomy, AV fistula repair. MEDICATIONS: Medications at the time of his admission, Eliquis, Lasix,  Cozaar, Toprol, Flomax, Combigan ophthalmic, _____, Ditropan. PAST MEDICAL HISTORY:  Hypertension, atrial fibrillation, heart failure,  BPH, and glaucoma. PHYSICAL EXAMINATION:  VITAL SIGNS:  5 fee 6 inches, 182 pounds, blood pressure is 130/70, heart  rate is 65 and regular, respirations 18 a minute. HEENT:  Normocephalic. Pupils equal and reactive to light and extraocular  muscles intact. NECK:  Supple. No JVD or adenopathy. LUNGS:  Clear. HEART:  Regular. 1/6 systolic murmur. ABDOMEN:  Soft. There is no guarding or rigidity, no distention. Bowel  sounds are present. EXTREMITIES:  Showed no edema.   SKIN:  Warm and dry.    IMPRESSION:  1. History of dysphagia, rule out possible stricture, esophageal.  2.  Organic heart disease, history of atrial fibrillation. 3.  BPH. 4.  Generalized weakness. PLAN:  The patient is on Eliquis therapy but we will proceed with upper  endoscopy at this time. We will hold Eliquis today.       Giulia Ngo DO    D: 06/27/2018 10:21:21       T: 06/27/2018 10:28:10     [id number]  Job#: [id number]     Doc#: [id number]    CC:

## 2018-06-27 NOTE — PLAN OF CARE
Problem: Falls - Risk of:  Goal: Will remain free from falls  Will remain free from falls   Outcome: Ongoing  Continue plan of care Avasyst continued

## 2018-06-27 NOTE — PROGRESS NOTES
benign  Musculoskeletal: No significant change in strength or tone. All joints stable. Inspection and palpation of digits and nails show no clubbing,       cyanosis or inflammatory conditions. Neuro/Psychiatric: Affect: flat but pleasant. Alert and oriented to person, place and     situation. No significant change in deep tendon reflexes or     sensation  Lungs:  Diminished, CTA-B. Respiration effort is normal at rest.     Heart:   S1 = S2, RRR. No loud murmurs. Abdomen:  Soft, non-tender, no enlargement of liver or spleen. Extremities:  No significant lower extremity edema or tenderness. Skin:   Intact to general survey, no visualized or palpated problems. Rehabilitation:  Physical therapy: FIMS:  Bed Mobility: Scooting: Modified independent    Transfers: Sit to Stand: Supervision  Stand to sit: Supervision  Bed to Chair: Supervision, Ambulation 1  Surface: carpet  Device: Rolling Walker  Other Apparatus: Wheelchair follow  Assistance: Stand by assistance  Quality of Gait: Flexed trunk and downward gaze with verbal and tactile throughout to improve posture. Distance: 100ft  Comments: Focused gait training on improving posture and step length.  , Stairs  # Steps : 4  Stairs Height: 6\"  Rails: Bilateral  Assistance: Stand by assistance  Comment: verbal cues for anterior hand placement with descent     FIMS: Bed, Chair, Wheel Chair: 4 - Requires steadying assistance only <25% assist  and/or requires assist with one leg only  Walk: 2 - Maximal Assistance Requires up to [address] requires assistance of one person to walk/operate wheelchair between  feet (Patient performs 25-49% of locomotion effort or goes between  feet)  Distance Walked: 80ft  Stairs: 2- Maximal Assistance Performs 25-49% of the effort to go up and down 4 to 6 stairs and requires the assistance of one person only,  , Assessment: Pt with increased gait distance.       Occupational therapy: FIMS:  Eatin - activities in PT, continue prophylaxis AJNET hose, elevation and  Elliquis. 9. Complex discharge planning:  Discharge June 30 home with home healthcare OT RN and home health care aide. SP weekly team meeting every Monday to assess progress towards goals, discuss and address social, psychological and medical comorbidities and to address difficulties they may be having progressing in therapy. Patient and family education is in progress. The patient is to follow-up with their family physician after discharge. Complex Active General Medical Issues that complicate care Assess & Plan:     1. Principal Problem:    Abnormality of gait and mobility due to Pneumonia and altered cariopulmonary with Generalized Weakness. [facility] admit 06/20/18. Active Problems:  1. Acute systolic CHF (congestive heart failure), NYHA class 4, Status post Atrial fibrillation with recent cardioversion now in sinus rhythm,   Hypertension-vital signs checks every shift consult Dr[facility]ig[facility]io [facility]an [facility]  2. Benign prostatic hyperplasia-monitor postvoid residuals  3. Compression fracture of vertebra with routine healing-add Lidoderm and K-pad  4. Pneumonia-continue antibiotics rule out aspiration consults recently which pathology  5. [address] old chart for eyedrops  6.  Significant residual versus esophageal dysphasia-status post modified barium swallow patient did well however I will consult GI to rule out esophageal stricture         Kali Rios D.O., PM&R     Attending    [address]

## 2018-06-27 NOTE — PROGRESS NOTES
Physical Therapy Rehab Treatment Note  Facility/Department: [facility]  Room: [place] [id number]-       NAME: Angela Kimble  : 3/7/1931 (80 y.o.)  MRN: [id number]  CODE STATUS: Full Code    Date of Service: 2018  Chart Reviewed: Yes  Family / Caregiver Present: No  Diagnosis: Pneumonia and altered cardiopulonary with generalized weakness    Restrictions:  Restrictions/Precautions: Fall Risk     SUBJECTIVE: Subjective: Pt states he is having problems with urination and has to go atleast once an hour. Pain Screening  Patient Currently in Pain: No     Post Treatment Pain Screenin/10     OBJECTIVE:        Bed mobility  Rolling to Left: Modified independent  Rolling to Right: Modified independent  Sit to Supine: Modified independent    Transfers  Sit to Stand: Modified independent  Stand to sit: Modified independent  Car Transfer: Stand by assistance  Comment: Improved ability to stand from car seat requiring SBA only. Good technique. Ambulation  Ambulation?: Yes  Ambulation 1  Surface: carpet  Device: Rolling Walker  Assistance: Supervision  Quality of Gait: Flexed trunk and downward gaze with verbal and tactile throughout to improve posture. Distance: 100ft  Ambulation 2  Surface - 2: carpet  Device 2: Single point cane  Assistance 2: Contact guard assistance;Stand by assistance  Quality of Gait 2: Slower pace with increased energy expendature. Good sequence with st cane and no LOB. Flexed trunk. Distance: 50ft  Stairs/Curb  Stairs?: Yes   Stairs  # Steps : 4  Stairs Height: 6\"  Rails: Bilateral  Assistance: Supervision     Exercises  Hip Flexion: seated x20  Knee Long Arc Quad: 2x10  Other exercises  Other exercises 3: Standing wall slides with cues for glute set with shld elevation to increase hip flexion and posture x5 ea. ASSESSMENT/COMMENTS:  Assessment: Pt tolerated treatment well today. Overall progressing towards goals. Pt demo'd good safey and indep with transfers and bed mobility.

## 2018-06-28 ENCOUNTER — ANESTHESIA EVENT (OUTPATIENT)
Dept: OPERATING ROOM | Age: 83
DRG: 177 | End: 2018-06-28
Payer: MEDICARE

## 2018-06-28 ENCOUNTER — ANESTHESIA (OUTPATIENT)
Dept: OPERATING ROOM | Age: 83
DRG: 177 | End: 2018-06-28
Payer: MEDICARE

## 2018-06-28 VITALS
SYSTOLIC BLOOD PRESSURE: 91 MMHG | RESPIRATION RATE: 19 BRPM | OXYGEN SATURATION: 100 % | DIASTOLIC BLOOD PRESSURE: 59 MMHG

## 2018-06-28 LAB
ANION GAP SERPL CALCULATED.3IONS-SCNC: 10 MEQ/L (ref 7–13)
BASOPHILS ABSOLUTE: 0.1 K/UL (ref 0–0.2)
BASOPHILS RELATIVE PERCENT: 1.2 %
BUN BLDV-MCNC: 19 MG/DL (ref 8–23)
CALCIUM SERPL-MCNC: 8.8 MG/DL (ref 8.6–10.2)
CHLORIDE BLD-SCNC: 99 MEQ/L (ref 98–107)
CO2: 27 MEQ/L (ref 22–29)
CREAT SERPL-MCNC: 1.16 MG/DL (ref 0.7–1.2)
EOSINOPHILS ABSOLUTE: 0.3 K/UL (ref 0–0.7)
EOSINOPHILS RELATIVE PERCENT: 4.8 %
GFR AFRICAN AMERICAN: >60
GFR NON-AFRICAN AMERICAN: 59.5
GLUCOSE BLD-MCNC: 80 MG/DL (ref 74–109)
HCT VFR BLD CALC: 35.1 % (ref 42–52)
HEMOGLOBIN: 11.7 G/DL (ref 14–18)
LYMPHOCYTES ABSOLUTE: 0.9 K/UL (ref 1–4.8)
LYMPHOCYTES RELATIVE PERCENT: 16.6 %
MCH RBC QN AUTO: 29.7 PG (ref 27–31.3)
MCHC RBC AUTO-ENTMCNC: 33.2 % (ref 33–37)
MCV RBC AUTO: 89.4 FL (ref 80–100)
MONOCYTES ABSOLUTE: 0.6 K/UL (ref 0.2–0.8)
MONOCYTES RELATIVE PERCENT: 10.9 %
NEUTROPHILS ABSOLUTE: 3.8 K/UL (ref 1.4–6.5)
NEUTROPHILS RELATIVE PERCENT: 66.5 %
PDW BLD-RTO: 14.8 % (ref 11.5–14.5)
PLATELET # BLD: 240 K/UL (ref 130–400)
POTASSIUM SERPL-SCNC: 4.4 MEQ/L (ref 3.5–5.1)
RBC # BLD: 3.92 M/UL (ref 4.7–6.1)
SODIUM BLD-SCNC: 136 MEQ/L (ref 132–144)
WBC # BLD: 5.7 K/UL (ref 4.8–10.8)

## 2018-06-28 PROCEDURE — 97127 HC OT THER IVNTJ W/FOCUS COG FUNCJ: CPT

## 2018-06-28 PROCEDURE — 80048 BASIC METABOLIC PNL TOTAL CA: CPT

## 2018-06-28 PROCEDURE — 3700000000 HC ANESTHESIA ATTENDED CARE: Performed by: INTERNAL MEDICINE

## 2018-06-28 PROCEDURE — 97530 THERAPEUTIC ACTIVITIES: CPT

## 2018-06-28 PROCEDURE — 6370000000 HC RX 637 (ALT 250 FOR IP): Performed by: INTERNAL MEDICINE

## 2018-06-28 PROCEDURE — 2500000003 HC RX 250 WO HCPCS: Performed by: NURSE ANESTHETIST, CERTIFIED REGISTERED

## 2018-06-28 PROCEDURE — 0DJ08ZZ INSPECTION OF UPPER INTESTINAL TRACT, VIA NATURAL OR ARTIFICIAL OPENING ENDOSCOPIC: ICD-10-PCS | Performed by: INTERNAL MEDICINE

## 2018-06-28 PROCEDURE — 36415 COLL VENOUS BLD VENIPUNCTURE: CPT

## 2018-06-28 PROCEDURE — 85025 COMPLETE CBC W/AUTO DIFF WBC: CPT

## 2018-06-28 PROCEDURE — 97112 NEUROMUSCULAR REEDUCATION: CPT

## 2018-06-28 PROCEDURE — 7100000001 HC PACU RECOVERY - ADDTL 15 MIN: Performed by: INTERNAL MEDICINE

## 2018-06-28 PROCEDURE — 97116 GAIT TRAINING THERAPY: CPT

## 2018-06-28 PROCEDURE — 6360000002 HC RX W HCPCS: Performed by: NURSE ANESTHETIST, CERTIFIED REGISTERED

## 2018-06-28 PROCEDURE — 3609017100 HC EGD: Performed by: INTERNAL MEDICINE

## 2018-06-28 PROCEDURE — 2580000003 HC RX 258: Performed by: NURSE ANESTHETIST, CERTIFIED REGISTERED

## 2018-06-28 PROCEDURE — 6370000000 HC RX 637 (ALT 250 FOR IP): Performed by: PHYSICAL MEDICINE & REHABILITATION

## 2018-06-28 PROCEDURE — 97110 THERAPEUTIC EXERCISES: CPT

## 2018-06-28 PROCEDURE — 7100000000 HC PACU RECOVERY - FIRST 15 MIN: Performed by: INTERNAL MEDICINE

## 2018-06-28 PROCEDURE — 99232 SBSQ HOSP IP/OBS MODERATE 35: CPT | Performed by: PHYSICAL MEDICINE & REHABILITATION

## 2018-06-28 PROCEDURE — 2580000003 HC RX 258: Performed by: ANESTHESIOLOGY

## 2018-06-28 PROCEDURE — 2580000003 HC RX 258: Performed by: INTERNAL MEDICINE

## 2018-06-28 PROCEDURE — 87077 CULTURE AEROBIC IDENTIFY: CPT

## 2018-06-28 PROCEDURE — 1180000000 HC REHAB R&B

## 2018-06-28 RX ORDER — SODIUM CHLORIDE, SODIUM LACTATE, POTASSIUM CHLORIDE, CALCIUM CHLORIDE 600; 310; 30; 20 MG/100ML; MG/100ML; MG/100ML; MG/100ML
INJECTION, SOLUTION INTRAVENOUS CONTINUOUS PRN
Status: DISCONTINUED | OUTPATIENT
Start: 2018-06-28 | End: 2018-06-28 | Stop reason: SDUPTHER

## 2018-06-28 RX ORDER — LIDOCAINE HYDROCHLORIDE 20 MG/ML
INJECTION, SOLUTION INFILTRATION; PERINEURAL PRN
Status: DISCONTINUED | OUTPATIENT
Start: 2018-06-28 | End: 2018-06-28 | Stop reason: SDUPTHER

## 2018-06-28 RX ORDER — SODIUM CHLORIDE, SODIUM LACTATE, POTASSIUM CHLORIDE, CALCIUM CHLORIDE 600; 310; 30; 20 MG/100ML; MG/100ML; MG/100ML; MG/100ML
INJECTION, SOLUTION INTRAVENOUS CONTINUOUS
Status: DISCONTINUED | OUTPATIENT
Start: 2018-06-28 | End: 2018-06-28 | Stop reason: ALTCHOICE

## 2018-06-28 RX ORDER — PANTOPRAZOLE SODIUM 40 MG/1
40 TABLET, DELAYED RELEASE ORAL
Status: DISCONTINUED | OUTPATIENT
Start: 2018-06-28 | End: 2018-06-30 | Stop reason: HOSPADM

## 2018-06-28 RX ORDER — ONDANSETRON 2 MG/ML
4 INJECTION INTRAMUSCULAR; INTRAVENOUS
Status: DISCONTINUED | OUTPATIENT
Start: 2018-06-28 | End: 2018-06-28 | Stop reason: HOSPADM

## 2018-06-28 RX ORDER — 0.9 % SODIUM CHLORIDE 0.9 %
10 VIAL (ML) INJECTION PRN
Status: DISCONTINUED | OUTPATIENT
Start: 2018-06-28 | End: 2018-06-30 | Stop reason: HOSPADM

## 2018-06-28 RX ORDER — PROPOFOL 10 MG/ML
INJECTION, EMULSION INTRAVENOUS PRN
Status: DISCONTINUED | OUTPATIENT
Start: 2018-06-28 | End: 2018-06-28 | Stop reason: SDUPTHER

## 2018-06-28 RX ORDER — GLYCOPYRROLATE 1 MG/5 ML
SYRINGE (ML) INTRAVENOUS PRN
Status: DISCONTINUED | OUTPATIENT
Start: 2018-06-28 | End: 2018-06-28 | Stop reason: SDUPTHER

## 2018-06-28 RX ORDER — 0.9 % SODIUM CHLORIDE 0.9 %
10 VIAL (ML) INJECTION EVERY 12 HOURS SCHEDULED
Status: DISCONTINUED | OUTPATIENT
Start: 2018-06-28 | End: 2018-06-30 | Stop reason: HOSPADM

## 2018-06-28 RX ADMIN — DOCUSATE SODIUM 100 MG: 100 CAPSULE, LIQUID FILLED ORAL at 16:31

## 2018-06-28 RX ADMIN — PROPOFOL 20 MG: 10 INJECTION, EMULSION INTRAVENOUS at 10:50

## 2018-06-28 RX ADMIN — SODIUM CHLORIDE, POTASSIUM CHLORIDE, SODIUM LACTATE AND CALCIUM CHLORIDE: 600; 310; 30; 20 INJECTION, SOLUTION INTRAVENOUS at 10:45

## 2018-06-28 RX ADMIN — Medication 10 ML: at 14:37

## 2018-06-28 RX ADMIN — Medication 10 ML: at 19:58

## 2018-06-28 RX ADMIN — Medication 0.1 MG: at 10:52

## 2018-06-28 RX ADMIN — APIXABAN 5 MG: 5 TABLET, FILM COATED ORAL at 19:56

## 2018-06-28 RX ADMIN — Medication: at 19:56

## 2018-06-28 RX ADMIN — BRIMONIDINE TARTRATE 1 DROP: 2 SOLUTION OPHTHALMIC at 09:09

## 2018-06-28 RX ADMIN — POTASSIUM CHLORIDE 20 MEQ: 20 TABLET, EXTENDED RELEASE ORAL at 14:36

## 2018-06-28 RX ADMIN — LIDOCAINE HYDROCHLORIDE 20 MG: 20 INJECTION, SOLUTION INFILTRATION; PERINEURAL at 10:50

## 2018-06-28 RX ADMIN — TAMSULOSIN HYDROCHLORIDE 0.4 MG: 0.4 CAPSULE ORAL at 19:56

## 2018-06-28 RX ADMIN — Medication 1 DROP: at 09:10

## 2018-06-28 RX ADMIN — LOSARTAN POTASSIUM 25 MG: 25 TABLET ORAL at 16:32

## 2018-06-28 RX ADMIN — OXYBUTYNIN CHLORIDE 2.5 MG: 5 TABLET ORAL at 19:56

## 2018-06-28 RX ADMIN — MUPIROCIN: 20 OINTMENT TOPICAL at 09:02

## 2018-06-28 RX ADMIN — SPIRONOLACTONE 12.5 MG: 25 TABLET, FILM COATED ORAL at 16:32

## 2018-06-28 RX ADMIN — POTASSIUM CHLORIDE 20 MEQ: 20 TABLET, EXTENDED RELEASE ORAL at 18:03

## 2018-06-28 RX ADMIN — PROPOFOL 20 MG: 10 INJECTION, EMULSION INTRAVENOUS at 10:52

## 2018-06-28 RX ADMIN — FUROSEMIDE 20 MG: 20 TABLET ORAL at 14:35

## 2018-06-28 RX ADMIN — Medication: at 16:34

## 2018-06-28 RX ADMIN — Medication: at 09:10

## 2018-06-28 RX ADMIN — Medication 0.2 MG: at 10:52

## 2018-06-28 RX ADMIN — AMIODARONE HYDROCHLORIDE 200 MG: 200 TABLET ORAL at 14:34

## 2018-06-28 RX ADMIN — LATANOPROST 1 DROP: 50 SOLUTION OPHTHALMIC at 19:56

## 2018-06-28 RX ADMIN — PANTOPRAZOLE SODIUM 40 MG: 40 TABLET, DELAYED RELEASE ORAL at 14:36

## 2018-06-28 RX ADMIN — SODIUM CHLORIDE, POTASSIUM CHLORIDE, SODIUM LACTATE AND CALCIUM CHLORIDE: 600; 310; 30; 20 INJECTION, SOLUTION INTRAVENOUS at 10:53

## 2018-06-28 RX ADMIN — ACETAMINOPHEN 650 MG: 325 TABLET ORAL at 03:43

## 2018-06-28 RX ADMIN — TIMOLOL MALEATE 1 DROP: 5 SOLUTION OPHTHALMIC at 08:57

## 2018-06-28 RX ADMIN — OXYBUTYNIN CHLORIDE 2.5 MG: 5 TABLET ORAL at 14:36

## 2018-06-28 RX ADMIN — BRIMONIDINE TARTRATE 1 DROP: 2 SOLUTION OPHTHALMIC at 19:57

## 2018-06-28 RX ADMIN — METOPROLOL SUCCINATE 25 MG: 25 TABLET, EXTENDED RELEASE ORAL at 09:02

## 2018-06-28 RX ADMIN — TIMOLOL MALEATE 1 DROP: 5 SOLUTION OPHTHALMIC at 19:57

## 2018-06-28 RX ADMIN — Medication: at 20:00

## 2018-06-28 RX ADMIN — Medication 0.1 MG: at 11:08

## 2018-06-28 ASSESSMENT — PULMONARY FUNCTION TESTS
PIF_VALUE: 0

## 2018-06-28 ASSESSMENT — PAIN SCALES - GENERAL
PAINLEVEL_OUTOF10: 0
PAINLEVEL_OUTOF10: 5

## 2018-06-28 NOTE — OP NOTE
[facility] [address]                       [address], [address] [facility]                                 OPERATIVE REPORT    PATIENT NAME: Roni Cohen                     :        1931  MED REC NO:   [id number]                            ROOM:       J036  ACCOUNT NO:   [de-identified]                           ADMIT DATE: 2018  PROVIDER:     Lili Jimenez DO    DATE OF PROCEDURE:  2018    INDICATION:  Dysphagia. COMMENTS:  The patient was placed in the left lateral decubitus position. Anesthesia administered. Medication and vital signs were stable throughout  the exam.  Endoscope was passed without difficulty and visualization of the  esophagus was performed. In the mid to esophagus, mucosal pattern was  normal.  Distal esophagus, there was evidence of marked tortuosity of  distal 10 cm. There was a ring associated with the evidence of hiatal  hernia. No ulcers or erythema was noted. Endoscope was then passed in the  stomach and the pictures were obtained for identification. There were mild  erythematous changes in the antral region. No ulcers or bleeding. Retroflexion of the scope in the cardia showed no abnormalities. The body  of the stomach was also normal.  Pylorus was then viewed with  noted edema,  inflammation, and again erythematous circular markings. A CHAN-test was  obtained in this area. The duodenal bulb and second limb of the duodenum  appeared to have inflammation of the mucosa with erythema present in the  _____ change manner. The endoscope was removed along with the air removed. The patient tolerated the procedure well. IMPRESSION:  1. Distal esophageal tortuosity with ring formation hiatal hernia. 2.  Erythema of the antral region, suggesting gastritis. 3.  Duodenitis. PLAN:  The patient should be on proton pump inhibitor, await CHAN-test to  determine whether the patient needs to be on antibiotics.   There was no  evidence of obstructive abnormality in the exam.        Kelly Stout DO    D: 06/28/2018 13:02:53       T: 06/28/2018 13:05:07     [email or web address]  Job#: [id number]     Doc#: [id number]    CC:

## 2018-06-28 NOTE — PROGRESS NOTES
Subjective: The patient complains of severe  acute  on chronic fatigue secondary to recent pneumonia and cardiopulmonary disease relieved by  PT, OT, rest and exacerbated by  recent pneumonia likely aspiration. He is for an EGD today to evaluate his severe dysphagia and rule out esophageal stricture. His anticoagulations were on hold. I am try to get him off the Assist video monitoring system is used less impulsive and is planning for discharge this weekend. ROS x10: The patient also complains of severely impaired mobility and activities of daily living. Otherwise no new problems with vision, hearing, nose, mouth, throat, dermal, cardiovascular, GI, , pulmonary, musculoskeletal, psychiatric or neurological. See Rehab H&P on Rehab chart dated . Vital signs:  BP (!) 115/51   Pulse 69   Temp 97 °F (36.1 °C)   Resp 18   Ht 5' 6\" (1.676 m)   Wt 182 lb 15.7 oz (83 kg)   SpO2 96%   BMI 29.53 kg/m²   I/O:   PO/Intake:  fair PO intake, severe dysphagia with salad-dysphagia to mechanically altered diet with thin liquids-until seen by GI doctor    Bowel/Bladder:  continent, no problems noted. General:  Patient is well developed, adequately nourished, non-obese and     well kempt. HEENT:    PERRLA, hearing intact to loud voice, external inspection of ear     and nose benign. Inspection of lips, tongue and gums benign  Musculoskeletal: No significant change in strength or tone. All joints stable. Inspection and palpation of digits and nails show no clubbing,       cyanosis or inflammatory conditions. Neuro/Psychiatric: Affect: flat but pleasant. Alert and oriented to person, place and     situation. No significant change in deep tendon reflexes or     sensation  Lungs:  Diminished, CTA-B. Respiration effort is normal at rest.     Heart:   S1 = S2, RRR. No loud murmurs. Abdomen:  Soft, non-tender, no enlargement of liver or spleen.   Extremities:  No significant lower extremity edema standing balance and endurance are pt's largest barrier to increase independence in ADLs and IADLs. Pt would continue to benefit from skilled OT servcies to address deficits and improve independence and safety in daily living. Speech therapy: FIMS: Comprehension: 4 - Patient understands basic needs 75-90%+ of the time  Expression: 4 - Expresses basic ideas/needs 75-90%+ of the time  Social Interaction: 5 - Patient is appropriate with supervision/cues  Problem Solvin - Patient solves simple/routine tasks 75-90%+   Memory: 4 - Patient remembers 75-90%+ of the time   Modified barium swallow 2018-unremarkable     Lab/X-ray studies reviewed, analyzed and discussed with patient and staff:   Recent Results (from the past 24 hour(s))   CBC Auto Differential    Collection Time: 18  5:38 AM   Result Value Ref Range    WBC 5.7 4.8 - 10.8 K/uL    RBC 3.92 (L) 4.70 - 6.10 M/uL    Hemoglobin 11.7 (L) 14.0 - 18.0 g/dL    Hematocrit 35.1 (L) 42.0 - 52.0 %    MCV 89.4 80.0 - 100.0 fL    MCH 29.7 27.0 - 31.3 pg    MCHC 33.2 33.0 - 37.0 %    RDW 14.8 (H) 11.5 - 14.5 %    Platelets 063 941 - 721 K/uL    Neutrophils % 66.5 %    Lymphocytes % 16.6 %    Monocytes % 10.9 %    Eosinophils % 4.8 %    Basophils % 1.2 %    Neutrophils # 3.8 1.4 - 6.5 K/uL    Lymphocytes # 0.9 (L) 1.0 - 4.8 K/uL    Monocytes # 0.6 0.2 - 0.8 K/uL    Eosinophils # 0.3 0.0 - 0.7 K/uL    Basophils # 0.1 0.0 - 0.2 K/uL   Basic Metabolic Panel    Collection Time: 18  5:39 AM   Result Value Ref Range    Sodium 136 132 - 144 mEq/L    Potassium 4.4 3.5 - 5.1 mEq/L    Chloride 99 98 - 107 mEq/L    CO2 27 22 - 29 mEq/L    Anion Gap 10 7 - 13 mEq/L    Glucose 80 74 - 109 mg/dL    BUN 19 8 - 23 mg/dL    CREATININE 1.16 0.70 - 1.20 mg/dL    GFR Non-African American 59.5 (L) >60    GFR  >60.0 >60    Calcium 8.8 8.6 - 10.2 mg/dL       Previous extensive, complex labs, notes and diagnostics reviewed and analyzed.      ALLERGIES:

## 2018-06-29 ENCOUNTER — TELEPHONE (OUTPATIENT)
Dept: PHARMACY | Age: 83
End: 2018-06-29

## 2018-06-29 DIAGNOSIS — I48.91 ATRIAL FIBRILLATION, UNSPECIFIED TYPE (HCC): ICD-10-CM

## 2018-06-29 LAB
ANION GAP SERPL CALCULATED.3IONS-SCNC: 10 MEQ/L (ref 7–13)
BUN BLDV-MCNC: 20 MG/DL (ref 8–23)
CALCIUM SERPL-MCNC: 8.6 MG/DL (ref 8.6–10.2)
CHLORIDE BLD-SCNC: 99 MEQ/L (ref 98–107)
CLOTEST: NEGATIVE
CO2: 28 MEQ/L (ref 22–29)
CREAT SERPL-MCNC: 1.19 MG/DL (ref 0.7–1.2)
GFR AFRICAN AMERICAN: >60
GFR NON-AFRICAN AMERICAN: 57.8
GLUCOSE BLD-MCNC: 80 MG/DL (ref 74–109)
POTASSIUM SERPL-SCNC: 4.4 MEQ/L (ref 3.5–5.1)
SODIUM BLD-SCNC: 137 MEQ/L (ref 132–144)

## 2018-06-29 PROCEDURE — 6370000000 HC RX 637 (ALT 250 FOR IP): Performed by: PHYSICAL MEDICINE & REHABILITATION

## 2018-06-29 PROCEDURE — 6370000000 HC RX 637 (ALT 250 FOR IP): Performed by: INTERNAL MEDICINE

## 2018-06-29 PROCEDURE — 97535 SELF CARE MNGMENT TRAINING: CPT

## 2018-06-29 PROCEDURE — 2580000003 HC RX 258: Performed by: INTERNAL MEDICINE

## 2018-06-29 PROCEDURE — 36415 COLL VENOUS BLD VENIPUNCTURE: CPT

## 2018-06-29 PROCEDURE — 1180000000 HC REHAB R&B

## 2018-06-29 PROCEDURE — 97110 THERAPEUTIC EXERCISES: CPT

## 2018-06-29 PROCEDURE — 80048 BASIC METABOLIC PNL TOTAL CA: CPT

## 2018-06-29 PROCEDURE — 99232 SBSQ HOSP IP/OBS MODERATE 35: CPT | Performed by: PHYSICAL MEDICINE & REHABILITATION

## 2018-06-29 RX ORDER — POTASSIUM CHLORIDE 20 MEQ/1
20 TABLET, EXTENDED RELEASE ORAL 2 TIMES DAILY
Qty: 60 TABLET | Refills: 3 | Status: SHIPPED | OUTPATIENT
Start: 2018-06-29 | End: 2018-10-08 | Stop reason: ALTCHOICE

## 2018-06-29 RX ORDER — AMIODARONE HYDROCHLORIDE 200 MG/1
200 TABLET ORAL DAILY
Qty: 30 TABLET | Refills: 3 | Status: SHIPPED | OUTPATIENT
Start: 2018-06-29 | End: 2022-03-04 | Stop reason: DRUGHIGH

## 2018-06-29 RX ORDER — METOPROLOL SUCCINATE 25 MG/1
25 TABLET, EXTENDED RELEASE ORAL DAILY
Qty: 30 TABLET | Refills: 3 | Status: SHIPPED | OUTPATIENT
Start: 2018-06-29 | End: 2018-10-08 | Stop reason: ALTCHOICE

## 2018-06-29 RX ORDER — FUROSEMIDE 20 MG/1
20 TABLET ORAL DAILY
Qty: 60 TABLET | Refills: 3 | Status: SHIPPED | OUTPATIENT
Start: 2018-06-29 | End: 2018-10-08 | Stop reason: ALTCHOICE

## 2018-06-29 RX ORDER — SPIRONOLACTONE 25 MG/1
12.5 TABLET ORAL DAILY
Qty: 30 TABLET | Refills: 3 | Status: SHIPPED | OUTPATIENT
Start: 2018-06-30 | End: 2018-06-29

## 2018-06-29 RX ORDER — LOSARTAN POTASSIUM 25 MG/1
25 TABLET ORAL DAILY
Qty: 30 TABLET | Refills: 3 | Status: SHIPPED | OUTPATIENT
Start: 2018-06-29 | End: 2022-03-04

## 2018-06-29 RX ORDER — PANTOPRAZOLE SODIUM 40 MG/1
40 TABLET, DELAYED RELEASE ORAL
Qty: 30 TABLET | Refills: 3 | Status: SHIPPED | OUTPATIENT
Start: 2018-06-30 | End: 2018-10-08 | Stop reason: ALTCHOICE

## 2018-06-29 RX ORDER — POTASSIUM CHLORIDE 20 MEQ/1
20 TABLET, EXTENDED RELEASE ORAL
Qty: 60 TABLET | Refills: 3 | Status: SHIPPED | OUTPATIENT
Start: 2018-06-29 | End: 2018-06-29

## 2018-06-29 RX ORDER — SPIRONOLACTONE 25 MG/1
12.5 TABLET ORAL DAILY
Qty: 30 TABLET | Refills: 3 | Status: SHIPPED | OUTPATIENT
Start: 2018-06-30 | End: 2018-10-08 | Stop reason: ALTCHOICE

## 2018-06-29 RX ORDER — POTASSIUM CHLORIDE 20 MEQ/1
20 TABLET, EXTENDED RELEASE ORAL 2 TIMES DAILY WITH MEALS
Status: DISCONTINUED | OUTPATIENT
Start: 2018-06-29 | End: 2018-06-30 | Stop reason: HOSPADM

## 2018-06-29 RX ADMIN — METOPROLOL SUCCINATE 25 MG: 25 TABLET, EXTENDED RELEASE ORAL at 08:09

## 2018-06-29 RX ADMIN — LATANOPROST 1 DROP: 50 SOLUTION OPHTHALMIC at 21:05

## 2018-06-29 RX ADMIN — Medication 10 ML: at 08:14

## 2018-06-29 RX ADMIN — APIXABAN 5 MG: 5 TABLET, FILM COATED ORAL at 21:01

## 2018-06-29 RX ADMIN — AMIODARONE HYDROCHLORIDE 200 MG: 200 TABLET ORAL at 08:09

## 2018-06-29 RX ADMIN — OXYBUTYNIN CHLORIDE 2.5 MG: 5 TABLET ORAL at 21:07

## 2018-06-29 RX ADMIN — TIMOLOL MALEATE 1 DROP: 5 SOLUTION OPHTHALMIC at 21:04

## 2018-06-29 RX ADMIN — OXYBUTYNIN CHLORIDE 2.5 MG: 5 TABLET ORAL at 14:37

## 2018-06-29 RX ADMIN — TIMOLOL MALEATE 1 DROP: 5 SOLUTION OPHTHALMIC at 08:13

## 2018-06-29 RX ADMIN — MUPIROCIN: 20 OINTMENT TOPICAL at 08:15

## 2018-06-29 RX ADMIN — BRIMONIDINE TARTRATE 1 DROP: 2 SOLUTION OPHTHALMIC at 08:12

## 2018-06-29 RX ADMIN — BRIMONIDINE TARTRATE 1 DROP: 2 SOLUTION OPHTHALMIC at 21:03

## 2018-06-29 RX ADMIN — Medication 1 DROP: at 17:44

## 2018-06-29 RX ADMIN — SPIRONOLACTONE 12.5 MG: 25 TABLET, FILM COATED ORAL at 08:10

## 2018-06-29 RX ADMIN — LOSARTAN POTASSIUM 25 MG: 25 TABLET ORAL at 08:11

## 2018-06-29 RX ADMIN — DOCUSATE SODIUM 100 MG: 100 CAPSULE, LIQUID FILLED ORAL at 08:09

## 2018-06-29 RX ADMIN — TAMSULOSIN HYDROCHLORIDE 0.4 MG: 0.4 CAPSULE ORAL at 08:10

## 2018-06-29 RX ADMIN — OXYBUTYNIN CHLORIDE 2.5 MG: 5 TABLET ORAL at 08:11

## 2018-06-29 RX ADMIN — Medication: at 08:16

## 2018-06-29 RX ADMIN — POTASSIUM CHLORIDE 20 MEQ: 20 TABLET, EXTENDED RELEASE ORAL at 08:07

## 2018-06-29 RX ADMIN — APIXABAN 5 MG: 5 TABLET, FILM COATED ORAL at 08:09

## 2018-06-29 RX ADMIN — OXYMETAZOLINE HYDROCHLORIDE 2 SPRAY: 5 SPRAY NASAL at 21:08

## 2018-06-29 RX ADMIN — Medication: at 17:44

## 2018-06-29 RX ADMIN — FUROSEMIDE 20 MG: 20 TABLET ORAL at 08:04

## 2018-06-29 RX ADMIN — PANTOPRAZOLE SODIUM 40 MG: 40 TABLET, DELAYED RELEASE ORAL at 05:37

## 2018-06-29 RX ADMIN — TAMSULOSIN HYDROCHLORIDE 0.4 MG: 0.4 CAPSULE ORAL at 21:08

## 2018-06-29 RX ADMIN — Medication: at 21:06

## 2018-06-29 ASSESSMENT — PAIN SCALES - GENERAL
PAINLEVEL_OUTOF10: 0

## 2018-06-29 NOTE — PROGRESS NOTES
tranfer. Pt required a seatd rest break x 2 (after every 2 tries into/out of shower) d/t fatigue. Cognition  Arousal/Alertness: Appropriate responses to stimuli  Following Commands: Follows one step commands consistently  Attention Span: Appears intact  Memory: Decreased short term memory;Decreased recall of recent events  Safety Judgement: Decreased awareness of need for assistance;Decreased awareness of need for safety  Problem Solving: Assistance required to generate solutions;Assistance required to implement solutions;Assistance required to identify errors made  Insights: Decreased awareness of deficits  Initiation: Requires cues for some  Sequencing: Requires cues for some  Cognition Comment: Comprehension: 5, Expression: 6, Social Interaction: 6, Problem-Solvin, Memory: 5        Type of ROM/Therapeutic Exercise  Type of ROM/Therapeutic Exercise: Free weights  Comment: OT educated pt on BUE HEP. Pt completed exercises using B 1lb free weights seated EOM. Pt required a visual model to complete each exercise during first set with explation on each exercise as part of education for HEP. During second set, pt demo'd independence with exercises using handout provided as reference. Exercises  Shoulder Flexion: 10 reps x 2 with BUEs   Shoulder ABduction: <> adduciton 10 reps x 2 with BUEs   Horizontal ABduction: 10 reps x 2 with LUE, then RUE  Elbow Flexion: 10 reps x 2 with BUEs   Elbow Extension: 10 reps x 2 with BUEs   Supination: <> pronation 10 reps x 2 with BUEs   Wrist Flexion: 10 reps x 2 with BUEs   Wrist Extension: 10 reps x 2 with BUEs   LUE AROM (degrees)  LUE AROM : WFL  Left Hand AROM (degrees)  Left Hand AROM: WFL  RUE AROM (degrees)  RUE AROM : WFL  Right Hand AROM (degrees)  Right Hand AROM: WFL     Assessment   Assessment: Pt independently completed BUE HEP. Pt required supervision to complete simulated shower transfer with 4\" lip.  Pt would benefit from Citl[facility] Mulligan OT services to continue working on strength, endurance, safety and independence with  ADLs and functional transfers/mobility. REQUIRES OT FOLLOW UP: Yes  Activity Tolerance  Activity Tolerance: Patient Tolerated treatment well  Safety Devices  Safety Devices in place: Yes  Type of devices: All fall risk precautions in place          Plan   Plan  Times per week:  minutes per day, 5-7 days per week  Plan weeks: 8-12 days   Current Treatment Recommendations: Strengthening, ROM, Balance Training, Endurance Training, Functional Mobility Training, Neuromuscular Re-education, Safety Education & Training, Equipment Evaluation, Education, & procurement, Patient/Caregiver Education & Training, Home Management Training, Self-Care / ADL  Plan Comment: Prepare for discharge on 6/30/18    Goals  Patient Goals   Patient goals : \"I want to do things myself. \" \"I used to shower standing. \"        Therapy Time   Individual Concurrent Group Co-treatment   Time In 1400         Time Out 1430         Minutes 2129 Eric Howell OT Electronically signed by Noah Canales OT on 6/29/2018 at 4:19 PM

## 2018-06-29 NOTE — PROGRESS NOTES
6/29/18 Doing well.  No abdominal pains Add Protonix on discharge     Abdominal no pains   Heart regular   Lungs clear  No follow up needed

## 2018-06-29 NOTE — PROGRESS NOTES
in one month with echo just before ov. Will need labs in 2 weeks as to cmp and tsh. Lab slips and cardiac scripts in chart. NOT to resume sotalol or warfarin at home. 7. I will sign off. Chief Complaint/Active Symptoms: Less fatigue, minimal dyspnea . No angina. No palp or dizziness. Edema unchanged. Much coughing with eating and then coughs up about 30 cc (gross estimate) of lunch from throat, not vomitus. Echo 2014: 55% LVEF. Long standing hypertension  Persistent atrial fibrillation  No history of CVA, CHF, valvular or coronary disease    Objective:   /76   Pulse 67   Temp 97 °F (36.1 °C) (Oral)   Resp 18   Ht 5' 6\" (1.676 m)   Wt 182 lb 15.7 oz (83 kg)   SpO2 97%   BMI 29.53 kg/m²    Wt Readings from Last 3 Encounters:   06/20/18 182 lb 15.7 oz (83 kg)   06/20/18 180 lb 12.4 oz (82 kg)   05/22/18 186 lb 3.2 oz (84.5 kg)       NYHA Class: III    Physical Exam:  General Appearance: alert and oriented to person, place and time, in no acute distress, but appears fatigued. Cardiovascular: normal rate, regular rhythm, normal S1 and S2, I/VI flow murmurs. No rubs, clicks. NO S3 gallops. no JVD   Pulmonary/Chest: clear to auscultation bilaterally- no wheezes, rales or rhonchi, normal air movement, no respiratory distress  Abdomen: soft, non-tender, non-distended, normal bowel sounds, no masses  Extremities: no cyanosis, clubbing. 1+ edema to 1/4 up calves.   Skin: warm and dry, no rash or erythema  Neck: supple and non-tender without mass, no thyromegaly   Neurological: alert, oriented, normal speech, no focal findings or movement disorder noted    Medications:    sodium chloride (PF)  10 mL Intravenous 2 times per day    pantoprazole  40 mg Oral QAM AC    apixaban  5 mg Oral BID    tamsulosin  0.4 mg Oral BID    cinnamon oil  1 drop Oral 4x Daily    oxymetazoline  2 spray Each Nare BID    docusate sodium  100 mg Oral Daily    ammonium lactate   Topical Daily    mupirocin   Topical Daily    spironolactone  12.5 mg Oral Daily    potassium chloride  20 mEq Oral TID     amiodarone  200 mg Oral Daily    furosemide  20 mg Oral Daily    losartan  25 mg Oral Daily    metoprolol succinate  25 mg Oral Daily    brimonidine  1 drop Both Eyes BID    And    timolol  1 drop Both Eyes BID    latanoprost  1 drop Both Eyes Nightly    oxybutynin  2.5 mg Oral TID    nystatin, stomahesive in petrolatum   Topical TID         sodium chloride (PF) 10 mL PRN   acetaminophen 650 mg Q4H PRN   ipratropium-albuterol 1 ampule Q4H PRN   magnesium hydroxide 30 mL Daily PRN       Diagnostics:  EKG:  NSR. Non specific stt. QTc 425    Echo: 6/18/2018 (TRISTEN)  1. Moderate global left ventricular dysfunction, ejection fraction 30%  2. Biatrial marked enlargement  3. Moderate mitral and tricuspid regurgitation  4. Moderate pericardial effusion circumferential without obvious Physiology  5. Moderate pleural effusion  6. Normal aorta is visualized. 7. Negative transesophageal echocardiogram for a source of emboli or masses. 8. Positive transesophageal echocardiogram with positive contrast bubble study probable patent foramen ovale. 9. Negative Doppler study for ASD, or VSD. Lab Data:    Cardiac Enzymes:  No results for input(s): CKTOTAL, CKMB, CKMBINDEX, TROPONINI in the last 72 hours.   ProBNP:   Lab Results   Component Value Date    PROBNP 1,554 06/22/2018       CBC:   Lab Results   Component Value Date    WBC 5.7 06/28/2018    RBC 3.92 06/28/2018    HGB 11.7 06/28/2018    HCT 35.1 06/28/2018     06/28/2018       CMP:    Lab Results   Component Value Date     06/29/2018    K 4.4 06/29/2018    CL 99 06/29/2018    CO2 28 06/29/2018    BUN 20 06/29/2018    CREATININE 1.19 06/29/2018    GFRAA >60.0 06/29/2018    LABGLOM 57.8 06/29/2018    GLUCOSE 80 06/29/2018    GLUCOSE 84 12/08/2011    CALCIUM 8.6 06/29/2018       Hepatic Function Panel:    Lab Results   Component Value Date    ALKPHOS 63 06/25/2018

## 2018-06-29 NOTE — PROGRESS NOTES
all times  Hearing  Hearing: Exceptions to [facility]  Hearing Exceptions: Hard of hearing/hearing concerns, No hearing aid     UE Function Status:    ROM:   LUE AROM (degrees)  LUE AROM : WFL  Left Hand AROM (degrees)  Left Hand AROM: WFL  RUE AROM (degrees)  RUE AROM : WFL  Right Hand AROM (degrees)  Right Hand AROM: WFL    Strength:  LUE Strength  LUE Strength Comment: Gross Assessment: 3+/5  RUE Strength  RUE Strength Comment: Gross Assessment: 3+/5    Coordination, Tone, Quality of Movement: Tone RUE  RUE Tone: Normotonic  Tone LUE  LUE Tone: Normotonic  Coordination  Movements Are Fluid And Coordinated: Yes    D/C Recommendations: Patient to be discharged home with wife on Saturday June 30, 2018 following completion of all therapies. Equipment Recommendations:   Lower body dressing equipment, shower chair, grab bars    OT Follow Up:  OT D/C RECOMMENDATIONS  REQUIRES OT FOLLOW UP: Yes  Type: Home OT, Home Health Care    Home Exercise Program Provided: [x] Yes [] No  If yes, type of HEP: Patient educated on bilateral UE strengthening HEP with handout provided.       Electronically signed by:    ALYSON Hernandez OTR/L  6/29/2018, 1:12 PM

## 2018-06-29 NOTE — PROGRESS NOTES
Subjective: The patient complains of severe  acute  on chronic fatigue secondary to recent pneumonia and cardiopulmonary disease relieved by  PT, OT, rest and exacerbated by  recent pneumonia likely aspiration. He  went for an EGD  yesterday to evaluate his severe dysphagia and rule out esophageal stricture. I discussed the results with he and Dr. Ana Fields. He does not have an esophageal stricture but does have a very tortuous and winding esophagus which likely causing those type of symptoms. He should be on Protonix and we put him on it also monitor his H&H. He also has been refusing his Mishra catheter events post void residuals are above 900. He is back on the Flomax and his blood pressures just stabilizing for now. His GI symptoms are improving on the Protonix. He is no longer impulsive it is also finishing monitoring system. His anticoagulations were on hold. I am try to get him off the Assist video monitoring system is used less impulsive and is planning for discharge this weekend. ROS x10: The patient also complains of severely impaired mobility and activities of daily living. Otherwise no new problems with vision, hearing, nose, mouth, throat, dermal, cardiovascular, GI, , pulmonary, musculoskeletal, psychiatric or neurological. See Rehab H&P on Rehab chart dated . Vital signs:  /70   Pulse 68   Temp 97 °F (36.1 °C) (Oral)   Resp 18   Ht 5' 6\" (1.676 m)   Wt 182 lb 15.7 oz (83 kg)   SpO2 97%   BMI 29.53 kg/m²   I/O:   PO/Intake:  fair PO intake, severe dysphagia with salad-dysphagia to mechanically altered diet with thin liquids-until seen by GI doctor    Bowel/Bladder:  continent, no problems noted. General:  Patient is well developed, adequately nourished, non-obese and     well kempt. HEENT:    PERRLA, hearing intact to loud voice, external inspection of ear     and nose benign.   Inspection of lips, tongue and gums benign  Musculoskeletal: No significant change in strength or tone. All joints stable. Inspection and palpation of digits and nails show no clubbing,       cyanosis or inflammatory conditions. Neuro/Psychiatric: Affect: flat but pleasant. Alert and oriented to person, place and     situation. No significant change in deep tendon reflexes or     sensation  Lungs:  Diminished, CTA-B. Respiration effort is normal at rest.     Heart:   S1 = S2, RRR. No loud murmurs. Abdomen:  Soft, non-tender, no enlargement of liver or spleen. Extremities:  No significant lower extremity edema or tenderness. Skin:   Intact to general survey, no visualized or palpated problems. Rehabilitation:  Physical therapy: FIMS:  Bed Mobility: Scooting: Modified independent    Transfers: Sit to Stand: Modified independent  Stand to sit: Modified independent  Bed to Chair: Modified independent, Ambulation 1  Surface: level tile  Device: Rolling Walker  Other Apparatus: Wheelchair follow  Assistance: Supervision  Quality of Gait: Flexed trunk and downward gaze with verbal cues to improve posture. Improved pace and step length. Distance: 120ft  Comments: Focused gait training on improving posture and step length.  , Stairs  # Steps : 4  Stairs Height: 6\"  Rails: Bilateral  Assistance: Supervision  Comment: verbal cues for anterior hand placement with descent     FIMS: Bed, Chair, Wheel Chair: 5 - Requires setup/supervision/cues  Walk: 2 - Maximal Assistance Requires up to [address] requires assistance of one person to walk/operate wheelchair between  feet (Patient performs 25-49% of locomotion effort or goes between  feet)  Distance Walked: 100ft  Stairs: 2- Maximal Assistance Performs 25-49% of the effort to go up and down 4 to 6 stairs and requires the assistance of one person only,  , Assessment: Pt with increased standng tolerance.       Occupational therapy: FIMS:  Eatin - Feeds self with adaptive equipment/dentures and/or feeds self with modified diet and/or performs own tube feeding  Groomin - Did not occur  Bathin - Did not occur  Dressing-Upper: 0 - Did not occur  Dressing-Lower: 0 - Did not occur  Toiletin - Requires setup/supervision/cues  Toilet Transfer: 5 - Requires setup/supervision/cues  Tub Transfer: 0 - Activity does not occur  Shower Transfer: 0 - Activity does not occur,  , Assessment: Dynamic standing balance and endurance are pt's largest barrier to increase independence in ADLs and IADLs. Pt would continue to benefit from skilled OT servcies to address deficits and improve independence and safety in daily living. Speech therapy: FIMS: Comprehension: 5 - Patient understands basic needs (hungry/hot/pain)  Expression: 5 - Expresses basic ideas/needs only (hungry/hot/pain)  Social Interaction: 5 - Patient is appropriate with supervision/cues  Problem Solvin - Patient solves simple/routine tasks 75-90%+   Memory: 4 - Patient remembers 75-90%+ of the time   Modified barium swallow 2018-unremarkable     Lab/X-ray studies reviewed, analyzed and discussed with patient and staff:   Recent Results (from the past 24 hour(s))   Basic Metabolic Panel    Collection Time: 18  6:00 AM   Result Value Ref Range    Sodium 137 132 - 144 mEq/L    Potassium 4.4 3.5 - 5.1 mEq/L    Chloride 99 98 - 107 mEq/L    CO2 28 22 - 29 mEq/L    Anion Gap 10 7 - 13 mEq/L    Glucose 80 74 - 109 mg/dL    BUN 20 8 - 23 mg/dL    CREATININE 1.19 0.70 - 1.20 mg/dL    GFR Non-African American 57.8 (L) >60    GFR  >60.0 >60    Calcium 8.6 8.6 - 10.2 mg/dL       Previous extensive, complex labs, notes and diagnostics reviewed and analyzed. ALLERGIES:    Allergies as of 2018 - Review Complete 2018   Allergen Reaction Noted    Sulfa antibiotics  2011      (please also verify by checking [facility])     Complex Physical Medicine & Rehab Issues Assess & Plan:   1.  Severe abnormality of gait and mobility and impaired self-care and ADL's secondary to progressive  Cardiopulmonary disease . Functional and medical status reassessed regarding patients ability to participate in therapies and patient found to be able to participate in acute intensive comprehensive inpatient rehabilitation program including PT/OT to improve balance, ambulation, ADLs, and to improve the P/AROM. Therapeutic modifications regarding activities in therapies, place, amount of time per day and intensity of therapy made daily. In bed therapies or bedside therapies prn.   2. Bowel and Bladder dysfunctionFrequent small volume urination: Check post void residuals frequent toileting, ambulate to bathroom with assistance, check post void residuals. Check for C.difficile x1 if >2 loose stools in 24 hours, continue bowel & bladder program.  Monitor bowel and bladder function. Lactinex 2 PO every AC. MOM prn, Brown Bomb prn, Glycerin suppository prn, enema prn.  3. Severe generalized OA pain: reassess pain every shift and prior to and after each therapy session, give prn medications, modalities prn in therapy, Lidoderm, K-pad prn.   4. Skin healing and breakdown risk:  continue pressure relief program.  Daily skin exams and reports from nursing. 5. Severe fatigue due to Nutritional and hydration deficiency:  Patient probably has aspiration pneumonia and he is choking on a solid I will downgraded his diet to mechanical soft with nectar 6 and have a modified barium swallow done as recently which pathology seen continue to monitor I&Os, calorie counts prn, dietary consult prn. Trial CoQ10 vitamin B12 and future  6. 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 7. Falls risk elevated:  patient to use call light to get nursing assistance to get up, bed and chair alarm. 8. Elevated DVT risk: progressive activities in PT, continue prophylaxis JANET hose, elevation and  [place]rist[place].   9. Complex discharge planning:  Discharge June 30 home with home healthcare OT RN and home health care aide. SP weekly team meeting  Monday to assess progress towards goals, discuss and address social, psychological and medical comorbidities and to address difficulties they may be having progressing in therapy. Patient and family education is in progress. The patient is to follow-up with their family physician after discharge. Complex Active General Medical Issues that complicate care Assess & Plan:     1. Principal Problem:    Abnormality of gait and mobility due to Pneumonia and altered cariopulmonary with Generalized Weakness. [facility] Rehab admit 06/20/18. Active Problems:  1. Acute systolic CHF (congestive heart failure), NYHA class 4, Status post Atrial fibrillation with recent cardioversion now in sinus rhythm,   Hypertension-vital signs checks every shift consult Dr. Xena Putnam [facility]  2. Benign prostatic hyperplasia-monitor postvoid residuals  3. Status post Compression fracture of vertebra with routine healing-add Lidoderm and K-pad  4. Status post Pneumonia-continue antibiotics rule out aspiration consults recently which pathology  5. [address] old chart for eyedrops  6. Significant residual versus esophageal dysphasia-status post modified barium swallow patient did well however -he will have an upper endoscopy with GI to rule out esophageal stricture is scheduled for June 28, 2018  7. Impulsivity improving-I've discussed with nursing that he should be weaned off the video monitoring system and 2 frequent checks on him with signs of the room not to get up on his own.          Leon Lemus D.O., PM&R     Attending    [address] [place]

## 2018-06-29 NOTE — PROGRESS NOTES
ambulate 50ft with LRD and indep - Meets with Supervision   Long term goal 4: Pt to manage 4 steps with B HR and indep - Meets with Supervision    Summary of POC: Throughout rehab stay, pt received skilled physical therapy treatment 1.5 hours daily for 1.5 weeks. Skilled Services Provided: Strengthening, Functional Mobility Training, Neuromuscular Re-education, Home Exercise Program, Equipment Evaluation, Education, & procurement, Safety Education & Training, Gait Training, Transfer Training, Balance Training, Endurance Training, Stair training, Patient/Caregiver Education & Training, ADL/Self-care Training, ROM, Cognitive/Perceptual Training (Please refer to daily notes for all treatment details)    ASSESSMENT: Pt achieving all mobility goals @ supervision assist or indep. Appropriate for DC from acute rehab PT program with wife supervised assist PRN. Discharge Plan: DC home with wife supervised assist and Citlalli Mulligan PT.     Electronically signed by Digna Reid PT on 7/2/2018 at 8:33 AM

## 2018-06-29 NOTE — PROGRESS NOTES
safety with all tasks sup to mod independent level. PLAN OF CARE/Safety:   Safety Devices  Type of devices:  All fall risk precautions in place      Therapy Time:   Individual   Time In 1000   Time Out 1100   Minutes 60     Minutes:60      Transfer/Bed mobility training:15      Gait training:15      Neuro re education:15     Therapeutic ex:15      Funmi Carr PTA, 06/29/18 at 10:59 AM

## 2018-06-30 VITALS
HEART RATE: 78 BPM | OXYGEN SATURATION: 97 % | BODY MASS INDEX: 29.41 KG/M2 | SYSTOLIC BLOOD PRESSURE: 96 MMHG | WEIGHT: 182.98 LBS | DIASTOLIC BLOOD PRESSURE: 64 MMHG | HEIGHT: 66 IN | TEMPERATURE: 98 F | RESPIRATION RATE: 16 BRPM

## 2018-06-30 LAB
ANION GAP SERPL CALCULATED.3IONS-SCNC: 10 MEQ/L (ref 7–13)
BUN BLDV-MCNC: 25 MG/DL (ref 8–23)
CALCIUM SERPL-MCNC: 8.5 MG/DL (ref 8.6–10.2)
CHLORIDE BLD-SCNC: 99 MEQ/L (ref 98–107)
CO2: 29 MEQ/L (ref 22–29)
CREAT SERPL-MCNC: 1.37 MG/DL (ref 0.7–1.2)
GFR AFRICAN AMERICAN: 59.4
GFR NON-AFRICAN AMERICAN: 49.1
GLUCOSE BLD-MCNC: 80 MG/DL (ref 74–109)
POTASSIUM SERPL-SCNC: 4.4 MEQ/L (ref 3.5–5.1)
SODIUM BLD-SCNC: 138 MEQ/L (ref 132–144)

## 2018-06-30 PROCEDURE — 6370000000 HC RX 637 (ALT 250 FOR IP): Performed by: INTERNAL MEDICINE

## 2018-06-30 PROCEDURE — 99238 HOSP IP/OBS DSCHRG MGMT 30/<: CPT | Performed by: PHYSICAL MEDICINE & REHABILITATION

## 2018-06-30 PROCEDURE — 36415 COLL VENOUS BLD VENIPUNCTURE: CPT

## 2018-06-30 PROCEDURE — 6370000000 HC RX 637 (ALT 250 FOR IP): Performed by: PHYSICAL MEDICINE & REHABILITATION

## 2018-06-30 PROCEDURE — 80048 BASIC METABOLIC PNL TOTAL CA: CPT

## 2018-06-30 RX ADMIN — TAMSULOSIN HYDROCHLORIDE 0.4 MG: 0.4 CAPSULE ORAL at 10:26

## 2018-06-30 RX ADMIN — POTASSIUM CHLORIDE 20 MEQ: 20 TABLET, EXTENDED RELEASE ORAL at 10:24

## 2018-06-30 RX ADMIN — OXYBUTYNIN CHLORIDE 2.5 MG: 5 TABLET ORAL at 10:26

## 2018-06-30 RX ADMIN — APIXABAN 5 MG: 5 TABLET, FILM COATED ORAL at 10:30

## 2018-06-30 RX ADMIN — MUPIROCIN: 20 OINTMENT TOPICAL at 10:33

## 2018-06-30 RX ADMIN — TIMOLOL MALEATE 1 DROP: 5 SOLUTION OPHTHALMIC at 10:31

## 2018-06-30 RX ADMIN — DOCUSATE SODIUM 100 MG: 100 CAPSULE, LIQUID FILLED ORAL at 10:26

## 2018-06-30 RX ADMIN — Medication: at 10:34

## 2018-06-30 RX ADMIN — BRIMONIDINE TARTRATE 1 DROP: 2 SOLUTION OPHTHALMIC at 10:31

## 2018-06-30 RX ADMIN — FUROSEMIDE 20 MG: 20 TABLET ORAL at 10:25

## 2018-06-30 RX ADMIN — AMIODARONE HYDROCHLORIDE 200 MG: 200 TABLET ORAL at 10:26

## 2018-06-30 RX ADMIN — PANTOPRAZOLE SODIUM 40 MG: 40 TABLET, DELAYED RELEASE ORAL at 06:10

## 2018-06-30 ASSESSMENT — PAIN SCALES - GENERAL
PAINLEVEL_OUTOF10: 0

## 2018-06-30 NOTE — DISCHARGE SUMMARY
all tasks using assistive device  Bathin - Able to bathe all 10 areas with setup/sup/cues  Dressing-Upper: 5 - Requires setup/supervision/cues and/or requires assist with presthesis/brace only  Dressing-Lower: 5 - Requires setup/supervision/cues and/or staff applies TEDS/prosthesis/brace only  Toiletin - Requires device (grab bar/walker/etc.)  Toilet Transfer: 6 - Independent with device (grab bar/walker/slide bar)  Tub Transfer: 0 - Activity does not occur  Shower Transfer: 0 - Activity does not occur,  , Assessment: Pt independently completed BUE HEP. Pt required supervision to complete simulated shower transfer with 4\" lip. Pt would benefit from [facility] OT services to continue working on strength, endurance, safety and independence with  ADLs and functional transfers/mobility. Speech therapy: FIMS: Comprehension: 5 - Patient understands basic needs (hungry/hot/pain)  Expression: 6 - Device used to express complex ideas/needs  Social Interaction: 6 - Patient requires medication for mood and/or effect  Problem Solvin - Patient able to solve simple/routine tasks  Memory: 5 - Patient requires prompting with stress/unfamiliar situations   Modified barium swallow 2018-unremarkable     Lab/X-ray studies reviewed, analyzed and discussed with patient and staff:   Recent Results (from the past 24 hour(s))   Basic Metabolic Panel    Collection Time: 18  6:00 AM   Result Value Ref Range    Sodium 138 132 - 144 mEq/L    Potassium 4.4 3.5 - 5.1 mEq/L    Chloride 99 98 - 107 mEq/L    CO2 29 22 - 29 mEq/L    Anion Gap 10 7 - 13 mEq/L    Glucose 80 74 - 109 mg/dL    BUN 25 (H) 8 - 23 mg/dL    CREATININE 1.37 (H) 0.70 - 1.20 mg/dL    GFR Non-African American 49.1 (L) >60    GFR  59.4 (L) >60    Calcium 8.5 (L) 8.6 - 10.2 mg/dL       Previous extensive, complex labs, notes and diagnostics reviewed and analyzed.      ALLERGIES:    Allergies as of 2018 - Review Complete 2018 Allergen Reaction Noted    Sulfa antibiotics  11/30/2011      (please also verify by checking [facility])     Complex Physical Medicine & Rehab Issues Assess & Plan:   1. Severe abnormality of gait and mobility and impaired self-care and ADL's secondary to progressive  Cardiopulmonary disease . Functional and medical status reassessed regarding patients ability to participate in therapies and patient found to be able to participate in acute intensive comprehensive inpatient rehabilitation program including PT/OT to improve balance, ambulation, ADLs, and to improve the P/AROM. Therapeutic modifications regarding activities in therapies, place, amount of time per day and intensity of therapy made daily. In bed therapies or bedside therapies prn.   2. Bowel and Bladder dysfunctionFrequent small volume urination: Check post void residuals frequent toileting, ambulate to bathroom with assistance, check post void residuals. Check for C.difficile x1 if >2 loose stools in 24 hours, continue bowel & bladder program.  Monitor bowel and bladder function. Lactinex 2 PO every AC. MOM prn, Brown Bomb prn, Glycerin suppository prn, enema prn.  3. Severe generalized OA pain: reassess pain every shift and prior to and after each therapy session, give prn medications, modalities prn in therapy, Lidoderm, K-pad prn.   4. Skin healing and breakdown risk:  continue pressure relief program.  Daily skin exams and reports from nursing. 5. Severe fatigue due to Nutritional and hydration deficiency:  Patient probably has aspiration pneumonia and he is choking on a solid I will downgraded his diet to mechanical soft with nectar 6 and have a modified barium swallow done as recently which pathology seen continue to monitor I&Os, calorie counts prn, dietary consult prn. Trial CoQ10 vitamin B12 and future  6. 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   7. Falls risk elevated:

## 2018-07-01 ENCOUNTER — CARE COORDINATION (OUTPATIENT)
Dept: CASE MANAGEMENT | Age: 83
End: 2018-07-01

## 2018-07-01 DIAGNOSIS — I50.21 ACUTE SYSTOLIC CHF (CONGESTIVE HEART FAILURE), NYHA CLASS 4 (HCC): Primary | ICD-10-CM

## 2018-07-01 DIAGNOSIS — I48.91 ATRIAL FIBRILLATION, UNSPECIFIED TYPE (HCC): ICD-10-CM

## 2018-07-01 PROCEDURE — 1111F DSCHRG MED/CURRENT MED MERGE: CPT | Performed by: FAMILY MEDICINE

## 2018-07-01 NOTE — CARE COORDINATION
Fatigue  Interventions for patient-reported symptoms:  Notified PCP/Physician, Notified Home Care (Comment: Request for [facility] Intake notified.)  Do you have a copy of your discharge instructions?:  Yes  Do you have all of your prescriptions and are they filled?:  Yes  Have you been contacted by a [address]?:  No  Have you scheduled your follow up appointment?:  Yes  How are you going to get to your appointment?:  Car - family or friend to transport  Were you discharged with any Home Care or Post Acute Services:  No  Do you have support at home?:  Partner/Spouse/SO  Do you feel like you have everything you need to keep you well at home?:  Yes  Care Transitions Interventions    Physical Therapy:  Completed     Occupational Therapy:  Completed            Follow Up  No future appointments.     Dawn Harrington RN

## 2018-07-02 ENCOUNTER — CARE COORDINATION (OUTPATIENT)
Dept: CARE COORDINATION | Age: 83
End: 2018-07-02

## 2018-07-02 NOTE — LETTER
7/2/2018    [address]      Dear Gage Sanders,    My name is Javier Ureña and I am a registered nurse who partners with Hunter Lerma MD to improve patients' health. Hunter Lerma MD believes you would benefit from working with me. As a member of your health care team, I would work with other providers involved in your care, offer education for your specific health conditions, and connect you with additional resources as needed. I will collaborate with Hunter Lerma MD to support you in following your treatment plan. The additional support I provide is no additional cost to you. My primary focus is to help you achieve specific goals and improve your health. We are committed to walk with you on this journey and look forward to working with you. Please call me to further discuss your healthcare needs. I am available by phone or for appointments at the office. You can reach me at [phone].     In good health,     Javier Ureña RN

## 2018-07-06 ENCOUNTER — ANTI-COAG VISIT (OUTPATIENT)
Dept: PHARMACY | Age: 83
End: 2018-07-06

## 2018-07-06 ENCOUNTER — TELEPHONE (OUTPATIENT)
Dept: INTERNAL MEDICINE CLINIC | Age: 83
End: 2018-07-06

## 2018-07-06 ENCOUNTER — TELEPHONE (OUTPATIENT)
Dept: PHARMACY | Age: 83
End: 2018-07-06

## 2018-07-06 DIAGNOSIS — I48.91 ATRIAL FIBRILLATION, UNSPECIFIED TYPE (HCC): ICD-10-CM

## 2018-07-06 NOTE — PROGRESS NOTES
Frirey loo started  Patient on Eliquis, confirmed with Ute at [facility] that patient no longer needs to have INR checked, may discharge

## 2018-07-06 NOTE — TELEPHONE ENCOUNTER
Called patient, hasn't had appointment.   Patient stated no longer taking warfarin, now on Eliquis (prescribed by Dr Daisy Darby on 6/29/18)

## 2018-07-10 ENCOUNTER — OFFICE VISIT (OUTPATIENT)
Dept: INTERNAL MEDICINE CLINIC | Age: 83
End: 2018-07-10
Payer: MEDICARE

## 2018-07-10 VITALS
OXYGEN SATURATION: 98 % | BODY MASS INDEX: 26.58 KG/M2 | WEIGHT: 165.4 LBS | HEART RATE: 63 BPM | HEIGHT: 66 IN | DIASTOLIC BLOOD PRESSURE: 70 MMHG | TEMPERATURE: 98.1 F | RESPIRATION RATE: 16 BRPM | SYSTOLIC BLOOD PRESSURE: 114 MMHG

## 2018-07-10 DIAGNOSIS — J18.9 PNEUMONIA OF BOTH LOWER LOBES DUE TO INFECTIOUS ORGANISM: Primary | ICD-10-CM

## 2018-07-10 DIAGNOSIS — R26.9 ABNORMALITY OF GAIT AND MOBILITY: ICD-10-CM

## 2018-07-10 DIAGNOSIS — I48.91 ATRIAL FIBRILLATION WITH RVR (HCC): ICD-10-CM

## 2018-07-10 PROCEDURE — 4040F PNEUMOC VAC/ADMIN/RCVD: CPT | Performed by: FAMILY MEDICINE

## 2018-07-10 PROCEDURE — G8417 CALC BMI ABV UP PARAM F/U: HCPCS | Performed by: FAMILY MEDICINE

## 2018-07-10 PROCEDURE — 1123F ACP DISCUSS/DSCN MKR DOCD: CPT | Performed by: FAMILY MEDICINE

## 2018-07-10 PROCEDURE — 1111F DSCHRG MED/CURRENT MED MERGE: CPT | Performed by: FAMILY MEDICINE

## 2018-07-10 PROCEDURE — 99214 OFFICE O/P EST MOD 30 MIN: CPT | Performed by: FAMILY MEDICINE

## 2018-07-10 PROCEDURE — 1036F TOBACCO NON-USER: CPT | Performed by: FAMILY MEDICINE

## 2018-07-10 PROCEDURE — G8427 DOCREV CUR MEDS BY ELIG CLIN: HCPCS | Performed by: FAMILY MEDICINE

## 2018-07-10 RX ORDER — POTASSIUM CHLORIDE 1500 MG/1
TABLET, FILM COATED, EXTENDED RELEASE ORAL
Refills: 3 | COMMUNITY
Start: 2018-06-30 | End: 2018-10-08

## 2018-07-10 ASSESSMENT — ENCOUNTER SYMPTOMS
ALLERGIC/IMMUNOLOGIC NEGATIVE: 1
GASTROINTESTINAL NEGATIVE: 1
EYES NEGATIVE: 1
SHORTNESS OF BREATH: 0
RESPIRATORY NEGATIVE: 1

## 2018-07-10 NOTE — PROGRESS NOTES
Narrative    None     Current Outpatient Prescriptions   Medication Sig Dispense Refill    pantoprazole (PROTONIX) 40 MG tablet Take 1 tablet by mouth every morning (before breakfast) 30 tablet 3    amiodarone (CORDARONE) 200 MG tablet Take 1 tablet by mouth daily 30 tablet 3    apixaban (ELIQUIS) 5 MG TABS tablet Take 1 tablet by mouth 2 times daily 60 tablet 1    furosemide (LASIX) 20 MG tablet Take 1 tablet by mouth daily 60 tablet 3    losartan (COZAAR) 25 MG tablet Take 1 tablet by mouth daily 30 tablet 3    metoprolol succinate (TOPROL XL) 25 MG extended release tablet Take 1 tablet by mouth daily 30 tablet 3    potassium chloride (KLOR-CON M) 20 MEQ extended release tablet Take 1 tablet by mouth 2 times daily 60 tablet 3    spironolactone (ALDACTONE) 25 MG tablet Take 0.5 tablets by mouth daily 30 tablet 3    oxybutynin (DITROPAN) 5 MG tablet Take 0.5 tablets by mouth 3 times daily 90 tablet 3    Multiple Vitamin (MULTI-VITAMIN DAILY PO) Take 1 tablet by mouth daily      tamsulosin (FLOMAX) 0.4 MG capsule Take 2 capsules by mouth daily 180 capsule 3    brimonidine-timolol (COMBIGAN) 0.2-0.5 % ophthalmic solution Place 1 drop into both eyes every 12 hours.  Glucosamine-Chondroit-Vit C-Mn (GLUCOSAMINE 1500 COMPLEX) CAPS Take  by mouth daily.  travoprost, benzalkonium, (TRAVATAN) 0.004 % ophthalmic solution 1 drop nightly.  vitamin E 400 UNIT capsule Take 400 Units by mouth daily.  potassium chloride (KLOR-CON M) 20 MEQ TBCR extended release tablet TK 1 T PO BID  3     No current facility-administered medications for this visit.       Current Outpatient Prescriptions on File Prior to Visit   Medication Sig Dispense Refill    pantoprazole (PROTONIX) 40 MG tablet Take 1 tablet by mouth every morning (before breakfast) 30 tablet 3    amiodarone (CORDARONE) 200 MG tablet Take 1 tablet by mouth daily 30 tablet 3    apixaban (ELIQUIS) 5 MG TABS tablet Take 1 tablet by mouth 2 times daily 60 tablet 1    furosemide (LASIX) 20 MG tablet Take 1 tablet by mouth daily 60 tablet 3    losartan (COZAAR) 25 MG tablet Take 1 tablet by mouth daily 30 tablet 3    metoprolol succinate (TOPROL XL) 25 MG extended release tablet Take 1 tablet by mouth daily 30 tablet 3    potassium chloride (KLOR-CON M) 20 MEQ extended release tablet Take 1 tablet by mouth 2 times daily 60 tablet 3    spironolactone (ALDACTONE) 25 MG tablet Take 0.5 tablets by mouth daily 30 tablet 3    oxybutynin (DITROPAN) 5 MG tablet Take 0.5 tablets by mouth 3 times daily 90 tablet 3    Multiple Vitamin (MULTI-VITAMIN DAILY PO) Take 1 tablet by mouth daily      tamsulosin (FLOMAX) 0.4 MG capsule Take 2 capsules by mouth daily 180 capsule 3    brimonidine-timolol (COMBIGAN) 0.2-0.5 % ophthalmic solution Place 1 drop into both eyes every 12 hours.  Glucosamine-Chondroit-Vit C-Mn (GLUCOSAMINE 1500 COMPLEX) CAPS Take  by mouth daily.  travoprost, benzalkonium, (TRAVATAN) 0.004 % ophthalmic solution 1 drop nightly.  vitamin E 400 UNIT capsule Take 400 Units by mouth daily. No current facility-administered medications on file prior to visit. Allergies   Allergen Reactions    Sulfa Antibiotics      Health Maintenance   Topic Date Due    DTaP/Tdap/Td vaccine (1 - Tdap) 05/17/2019 (Originally 3/7/1950)    Shingles Vaccine (1 of 2 - 2 Dose Series) 05/17/2019 (Originally 3/7/1981)    Flu vaccine (1) 09/01/2018    Pneumococcal low/med risk (2 of 2 - PPSV23) 05/17/2019    TSH testing  06/12/2019    Potassium monitoring  06/30/2019    Creatinine monitoring  06/30/2019       Review of Systems    Review of Systems   Constitutional: Negative for activity change, appetite change, chills, fever and unexpected weight change. HENT: Negative. Eyes: Negative. Respiratory: Negative. Negative for shortness of breath. Cardiovascular: Negative. Negative for chest pain and palpitations.

## 2018-07-12 ENCOUNTER — HOSPITAL ENCOUNTER (EMERGENCY)
Age: 83
Discharge: HOME | End: 2018-07-13
Payer: MEDICARE

## 2018-07-12 ENCOUNTER — TELEPHONE (OUTPATIENT)
Dept: OTHER | Facility: CLINIC | Age: 83
End: 2018-07-12

## 2018-07-12 DIAGNOSIS — E87.5 HYPERKALEMIA: Primary | ICD-10-CM

## 2018-07-12 LAB
ANION GAP SERPL CALCULATED.3IONS-SCNC: 10 MEQ/L (ref 7–13)
BASOPHILS ABSOLUTE: 0.1 K/UL (ref 0–0.2)
BASOPHILS RELATIVE PERCENT: 1.2 %
BUN BLDV-MCNC: 30 MG/DL (ref 8–23)
CALCIUM SERPL-MCNC: 9.1 MG/DL (ref 8.6–10.2)
CHLORIDE BLD-SCNC: 102 MEQ/L (ref 98–107)
CO2: 24 MEQ/L (ref 22–29)
CREAT SERPL-MCNC: 1.65 MG/DL (ref 0.7–1.2)
EKG ATRIAL RATE: 2 BPM
EKG ATRIAL RATE: 66 BPM
EKG P AXIS: 26 DEGREES
EKG P-R INTERVAL: 180 MS
EKG Q-T INTERVAL: 402 MS
EKG Q-T INTERVAL: 646 MS
EKG QRS DURATION: 92 MS
EKG QRS DURATION: 94 MS
EKG QTC CALCULATION (BAZETT): 117 MS
EKG QTC CALCULATION (BAZETT): 421 MS
EKG R AXIS: -30 DEGREES
EKG R AXIS: -40 DEGREES
EKG T AXIS: 150 DEGREES
EKG T AXIS: 16 DEGREES
EKG VENTRICULAR RATE: 2 BPM
EKG VENTRICULAR RATE: 66 BPM
EOSINOPHILS ABSOLUTE: 0.3 K/UL (ref 0–0.7)
EOSINOPHILS RELATIVE PERCENT: 3.6 %
GFR AFRICAN AMERICAN: 47.9
GFR NON-AFRICAN AMERICAN: 39.6
GLUCOSE BLD-MCNC: 86 MG/DL (ref 74–109)
HCT VFR BLD CALC: 39.7 % (ref 42–52)
HEMOGLOBIN: 13.6 G/DL (ref 14–18)
LYMPHOCYTES ABSOLUTE: 2 K/UL (ref 1–4.8)
LYMPHOCYTES RELATIVE PERCENT: 24.9 %
MCH RBC QN AUTO: 30.8 PG (ref 27–31.3)
MCHC RBC AUTO-ENTMCNC: 34.3 % (ref 33–37)
MCV RBC AUTO: 89.6 FL (ref 80–100)
MONOCYTES ABSOLUTE: 0.6 K/UL (ref 0.2–0.8)
MONOCYTES RELATIVE PERCENT: 8.1 %
NEUTROPHILS ABSOLUTE: 4.9 K/UL (ref 1.4–6.5)
NEUTROPHILS RELATIVE PERCENT: 62.2 %
PDW BLD-RTO: 15.5 % (ref 11.5–14.5)
PLATELET # BLD: 278 K/UL (ref 130–400)
POTASSIUM SERPL-SCNC: 5.5 MEQ/L (ref 3.5–5.1)
RBC # BLD: 4.43 M/UL (ref 4.7–6.1)
SODIUM BLD-SCNC: 136 MEQ/L (ref 132–144)
WBC # BLD: 7.9 K/UL (ref 4.8–10.8)

## 2018-07-12 PROCEDURE — 99284 EMERGENCY DEPT VISIT MOD MDM: CPT

## 2018-07-12 PROCEDURE — 85025 COMPLETE CBC W/AUTO DIFF WBC: CPT

## 2018-07-12 PROCEDURE — 93005 ELECTROCARDIOGRAM TRACING: CPT

## 2018-07-12 PROCEDURE — 80048 BASIC METABOLIC PNL TOTAL CA: CPT

## 2018-07-12 PROCEDURE — 36415 COLL VENOUS BLD VENIPUNCTURE: CPT

## 2018-07-12 RX ORDER — SODIUM CHLORIDE 0.9 % (FLUSH) 0.9 %
3 SYRINGE (ML) INJECTION EVERY 8 HOURS
Status: DISCONTINUED | OUTPATIENT
Start: 2018-07-12 | End: 2018-07-13 | Stop reason: HOSPADM

## 2018-07-13 ENCOUNTER — TELEPHONE (OUTPATIENT)
Dept: OTHER | Facility: CLINIC | Age: 83
End: 2018-07-13

## 2018-07-13 ENCOUNTER — CARE COORDINATION (OUTPATIENT)
Dept: CASE MANAGEMENT | Age: 83
End: 2018-07-13

## 2018-07-13 VITALS
DIASTOLIC BLOOD PRESSURE: 67 MMHG | BODY MASS INDEX: 27 KG/M2 | WEIGHT: 168 LBS | HEART RATE: 71 BPM | HEIGHT: 66 IN | RESPIRATION RATE: 20 BRPM | OXYGEN SATURATION: 99 % | TEMPERATURE: 98.3 F | SYSTOLIC BLOOD PRESSURE: 120 MMHG

## 2018-07-13 ASSESSMENT — ENCOUNTER SYMPTOMS
WHEEZING: 0
SHORTNESS OF BREATH: 0
ABDOMINAL PAIN: 0
COLOR CHANGE: 0
NAUSEA: 0
ABDOMINAL DISTENTION: 0
TROUBLE SWALLOWING: 0
COUGH: 0
CHEST TIGHTNESS: 0
CONSTIPATION: 0
VOMITING: 0
DIARRHEA: 0
RHINORRHEA: 0

## 2018-07-13 NOTE — ED PROVIDER NOTES
Radiologist below, if available at the time of this note:    No orders to display         ED BEDSIDE ULTRASOUND:   Performed by ED Physician - none    LABS:  Labs Reviewed   BASIC METABOLIC PANEL - Abnormal; Notable for the following:        Result Value    Potassium 5.5 (*)     BUN 30 (*)     CREATININE 1.65 (*)     GFR Non- 39.6 (*)     GFR  47.9 (*)     All other components within normal limits   CBC WITH AUTO DIFFERENTIAL - Abnormal; Notable for the following:     RBC 4.43 (*)     Hemoglobin 13.6 (*)     Hematocrit 39.7 (*)     RDW 15.5 (*)     All other components within normal limits       All other labs were within normal range or not returned as of this dictation. EMERGENCY DEPARTMENT COURSE and DIFFERENTIAL DIAGNOSIS/MDM:   Vitals:    Vitals:    07/12/18 2147 07/12/18 2329   BP: (!) 153/73 121/68   Pulse: 70 66   Resp: 17 18   Temp: 98.3 °F (36.8 °C)    TempSrc: Oral    SpO2: 100% 99%   Weight: 168 lb (76.2 kg)    Height: 5' 6\" (1.676 m)             MDM patient is nontoxic afebrile and in no acute distress. Patient has no complaints at this time. Basic labs and EKG ordered for further evaluation and report of potential high potassium. Potassium level 5.5 is noted on evaluation. Patient remained nonsymptomatic during ER visit. Consult with attending physician Dr. Archana López, instructed patient is safe to return home return to medications as they're ordered by cardiologist.  Patient is encouraged to return to the ER for any new onset or other concerning symptoms chest pain shortness of breath. Patient instructed to contact primary care provider and cardiologist Dr. Shiol Morris tomorrow morning for further instructions and evaluation. Patient verbalizes understanding of all given a shot of education. Standard anticipatory guidance given to patient upon discharge. Have given them a specific time frame in which to follow-up and who to follow-up with.  I have also advised them

## 2018-07-13 NOTE — TELEPHONE ENCOUNTER
F/U scheduled as requested by ED provider. Patient contacted to inform of scheduled follow up appointment with Dr. Casey Jones on 7/17 @ 11:15 [place]  and Dr. Jonathon Sands on 7/17 @ 9 AM, will be seeing NP - Flores Overall. Patient verbalized understanding and all questions were answered.

## 2018-07-13 NOTE — TELEPHONE ENCOUNTER
Writer contacted [facility] EMERGENCY [facility] ED to inform of 30 day readmission risk. Admit or discharge has not been determined. Will continue to follow-up.

## 2018-07-13 NOTE — ED TRIAGE NOTES
Pt reports to ED from home d/t elevated K+ of 6 per Dr Shilo Morris. Pt was seen today and called to come in. Pt denies any chest pain, SOB.  Pt is A/O x 4, skin p/w/d

## 2018-07-15 PROCEDURE — 93010 ELECTROCARDIOGRAM REPORT: CPT | Performed by: INTERNAL MEDICINE

## 2018-07-17 ENCOUNTER — OFFICE VISIT (OUTPATIENT)
Dept: INTERNAL MEDICINE CLINIC | Age: 83
End: 2018-07-17
Payer: MEDICARE

## 2018-07-17 ENCOUNTER — CARE COORDINATION (OUTPATIENT)
Dept: CARE COORDINATION | Age: 83
End: 2018-07-17

## 2018-07-17 VITALS
OXYGEN SATURATION: 99 % | HEIGHT: 66 IN | HEART RATE: 68 BPM | TEMPERATURE: 97.4 F | WEIGHT: 168.8 LBS | BODY MASS INDEX: 27.13 KG/M2 | RESPIRATION RATE: 17 BRPM | DIASTOLIC BLOOD PRESSURE: 66 MMHG | SYSTOLIC BLOOD PRESSURE: 116 MMHG

## 2018-07-17 DIAGNOSIS — I48.91 ATRIAL FIBRILLATION WITH RVR (HCC): ICD-10-CM

## 2018-07-17 DIAGNOSIS — I50.21 ACUTE SYSTOLIC CHF (CONGESTIVE HEART FAILURE), NYHA CLASS 4 (HCC): ICD-10-CM

## 2018-07-17 DIAGNOSIS — I10 ESSENTIAL HYPERTENSION: Primary | ICD-10-CM

## 2018-07-17 DIAGNOSIS — E87.5 HYPERKALEMIA: ICD-10-CM

## 2018-07-17 DIAGNOSIS — E03.9 HYPOTHYROIDISM, UNSPECIFIED TYPE: ICD-10-CM

## 2018-07-17 DIAGNOSIS — R53.1 GENERALIZED WEAKNESS: ICD-10-CM

## 2018-07-17 PROCEDURE — 1111F DSCHRG MED/CURRENT MED MERGE: CPT | Performed by: FAMILY MEDICINE

## 2018-07-17 PROCEDURE — 99213 OFFICE O/P EST LOW 20 MIN: CPT | Performed by: FAMILY MEDICINE

## 2018-07-17 PROCEDURE — G8427 DOCREV CUR MEDS BY ELIG CLIN: HCPCS | Performed by: FAMILY MEDICINE

## 2018-07-17 PROCEDURE — G8417 CALC BMI ABV UP PARAM F/U: HCPCS | Performed by: FAMILY MEDICINE

## 2018-07-17 PROCEDURE — 1123F ACP DISCUSS/DSCN MKR DOCD: CPT | Performed by: FAMILY MEDICINE

## 2018-07-17 PROCEDURE — 1101F PT FALLS ASSESS-DOCD LE1/YR: CPT | Performed by: FAMILY MEDICINE

## 2018-07-17 PROCEDURE — 4040F PNEUMOC VAC/ADMIN/RCVD: CPT | Performed by: FAMILY MEDICINE

## 2018-07-17 PROCEDURE — 1036F TOBACCO NON-USER: CPT | Performed by: FAMILY MEDICINE

## 2018-07-17 RX ORDER — LEVOTHYROXINE SODIUM 0.03 MG/1
25 TABLET ORAL DAILY
Qty: 30 TABLET | Refills: 5 | Status: SHIPPED | OUTPATIENT
Start: 2018-07-17

## 2018-07-17 RX ORDER — LEVOTHYROXINE SODIUM 0.03 MG/1
25 TABLET ORAL
Refills: 5 | COMMUNITY
Start: 2018-07-13 | End: 2018-07-17 | Stop reason: SDUPTHER

## 2018-07-17 ASSESSMENT — ENCOUNTER SYMPTOMS
EYES NEGATIVE: 1
RESPIRATORY NEGATIVE: 1
ALLERGIC/IMMUNOLOGIC NEGATIVE: 1
SHORTNESS OF BREATH: 0
GASTROINTESTINAL NEGATIVE: 1

## 2018-07-17 NOTE — PROGRESS NOTES
Patient is seen in follow up for   Chief Complaint   Patient presents with    Follow-Up from Hospital     Patient present here for a hospital f/u for hyperkelemia,was at  [facility] on 07/12/18. Patient is feeling okay at this moment.  Health Maintenance     Deneis tdap and shingrix. HPIhere for follow up high potasium, feeling well. Past Medical History:   Diagnosis Date    Atrial fibrillation ([address]     BPH (benign prostatic hypertrophy)     Fistula     Glaucoma     Hypertension      Patient Active Problem List    Diagnosis Date Noted    Acute systolic CHF (congestive heart failure), NYHA class 4 ([address] 06/19/2018     Priority: High    Essential hypertension 06/19/2018     Priority: High    History of cholecystectomy 06/25/2018    Glaucoma 06/25/2018    Abnormality of gait and mobility due to Pneumonia and altered cariopulmonary with Generalized Weakness. [facility] admit 06/20/18. 06/25/2018    Pneumonia 06/21/2018    Generalized weakness 06/20/2018    Atrial fibrillation with RVR ([address] 06/11/2018    H/O total knee replacement 12/01/2015    Compression fracture of vertebra with routine healing 08/28/2014    Fistula     Benign prostatic hyperplasia     Hypertension      Past Surgical History:   Procedure Laterality Date    AV FISTULA REPAIR      CHOLECYSTECTOMY      [place] EGD TRANSORAL BIOPSY SINGLE/MULTIPLE N/A 6/28/2018    EGD performed by Michael Molina DO at [facility]     History reviewed. No pertinent family history.   Social History     Social History    Marital status:      Spouse name: N/A    Number of children: N/A    Years of education: N/A     Social History Main Topics    Smoking status: Never Smoker    Smokeless tobacco: Never Used    Alcohol use Yes      Comment: social    Drug use: No    Sexual activity: Not Asked     Other Topics Concern    None     Social History Narrative    None     Current Outpatient Prescriptions   Medication Sig Dispense Refill    triamcinolone (KENALOG) 0.1 % ointment Apply topically 2 times daily for 7 days 2 Tube 6    levothyroxine (SYNTHROID) 25 MCG tablet Take 1 tablet by mouth Daily 30 tablet 5    pantoprazole (PROTONIX) 40 MG tablet Take 1 tablet by mouth every morning (before breakfast) 30 tablet 3    amiodarone (CORDARONE) 200 MG tablet Take 1 tablet by mouth daily 30 tablet 3    apixaban (ELIQUIS) 5 MG TABS tablet Take 1 tablet by mouth 2 times daily 60 tablet 1    furosemide (LASIX) 20 MG tablet Take 1 tablet by mouth daily 60 tablet 3    losartan (COZAAR) 25 MG tablet Take 1 tablet by mouth daily 30 tablet 3    metoprolol succinate (TOPROL XL) 25 MG extended release tablet Take 1 tablet by mouth daily 30 tablet 3    spironolactone (ALDACTONE) 25 MG tablet Take 0.5 tablets by mouth daily 30 tablet 3    oxybutynin (DITROPAN) 5 MG tablet Take 0.5 tablets by mouth 3 times daily 90 tablet 3    Multiple Vitamin (MULTI-VITAMIN DAILY PO) Take 1 tablet by mouth daily      brimonidine-timolol (COMBIGAN) 0.2-0.5 % ophthalmic solution Place 1 drop into both eyes every 12 hours.  vitamin E 400 UNIT capsule Take 400 Units by mouth daily.  potassium chloride (KLOR-CON M) 20 MEQ TBCR extended release tablet TK 1 T PO BID  3    potassium chloride (KLOR-CON M) 20 MEQ extended release tablet Take 1 tablet by mouth 2 times daily 60 tablet 3    tamsulosin (FLOMAX) 0.4 MG capsule Take 2 capsules by mouth daily 180 capsule 3    Glucosamine-Chondroit-Vit C-Mn (GLUCOSAMINE 1500 COMPLEX) CAPS Take  by mouth daily.  travoprost, benzalkonium, (TRAVATAN) 0.004 % ophthalmic solution 1 drop nightly. No current facility-administered medications for this visit.       Current Outpatient Prescriptions on File Prior to Visit   Medication Sig Dispense Refill    pantoprazole (PROTONIX) 40 MG tablet Take 1 tablet by mouth every morning (before breakfast) 30 tablet 3    amiodarone (CORDARONE) 200 MG tablet Take 1 tablet by (KENALOG) 0.1 % ointment     Sig: Apply topically 2 times daily for 7 days     Dispense:  2 Tube     Refill:  6    levothyroxine (SYNTHROID) 25 MCG tablet     Sig: Take 1 tablet by mouth Daily     Dispense:  30 tablet     Refill:  5     Follow up in three months.   Itzel Chung MD

## 2018-07-18 PROCEDURE — 93010 ELECTROCARDIOGRAM REPORT: CPT | Performed by: INTERNAL MEDICINE

## 2018-07-19 ENCOUNTER — CARE COORDINATION (OUTPATIENT)
Dept: CARE COORDINATION | Age: 83
End: 2018-07-19

## 2018-08-01 ENCOUNTER — CARE COORDINATION (OUTPATIENT)
Dept: CARE COORDINATION | Age: 83
End: 2018-08-01

## 2018-09-10 ENCOUNTER — HOSPITAL ENCOUNTER (OUTPATIENT)
Dept: PULMONOLOGY | Age: 83
Discharge: HOME | End: 2018-09-10
Payer: MEDICARE

## 2018-09-10 PROCEDURE — 94060 EVALUATION OF WHEEZING: CPT | Performed by: INTERNAL MEDICINE

## 2018-09-10 PROCEDURE — 94060 EVALUATION OF WHEEZING: CPT

## 2018-09-10 PROCEDURE — 94726 PLETHYSMOGRAPHY LUNG VOLUMES: CPT | Performed by: INTERNAL MEDICINE

## 2018-09-10 PROCEDURE — 6360000002 HC RX W HCPCS: Performed by: NURSE PRACTITIONER

## 2018-09-10 PROCEDURE — 94729 DIFFUSING CAPACITY: CPT

## 2018-09-10 PROCEDURE — 94726 PLETHYSMOGRAPHY LUNG VOLUMES: CPT

## 2018-09-10 PROCEDURE — 94729 DIFFUSING CAPACITY: CPT | Performed by: INTERNAL MEDICINE

## 2018-09-10 RX ORDER — ALBUTEROL SULFATE 2.5 MG/3ML
2.5 SOLUTION RESPIRATORY (INHALATION) ONCE
Status: COMPLETED | OUTPATIENT
Start: 2018-09-10 | End: 2018-09-10

## 2018-09-10 RX ADMIN — ALBUTEROL SULFATE 2.5 MG: 2.5 SOLUTION RESPIRATORY (INHALATION) at 11:31

## 2018-10-08 ENCOUNTER — OFFICE VISIT (OUTPATIENT)
Dept: INTERNAL MEDICINE CLINIC | Age: 83
End: 2018-10-08
Payer: MEDICARE

## 2018-10-08 VITALS
TEMPERATURE: 98.3 F | SYSTOLIC BLOOD PRESSURE: 134 MMHG | BODY MASS INDEX: 28.61 KG/M2 | HEART RATE: 62 BPM | OXYGEN SATURATION: 98 % | HEIGHT: 66 IN | WEIGHT: 178 LBS | RESPIRATION RATE: 18 BRPM | DIASTOLIC BLOOD PRESSURE: 78 MMHG

## 2018-10-08 DIAGNOSIS — G89.29 CHRONIC BILATERAL LOW BACK PAIN WITHOUT SCIATICA: Primary | ICD-10-CM

## 2018-10-08 DIAGNOSIS — I10 ESSENTIAL HYPERTENSION: ICD-10-CM

## 2018-10-08 DIAGNOSIS — M54.50 CHRONIC BILATERAL LOW BACK PAIN WITHOUT SCIATICA: Primary | ICD-10-CM

## 2018-10-08 DIAGNOSIS — I50.21 ACUTE SYSTOLIC CHF (CONGESTIVE HEART FAILURE), NYHA CLASS 4 (HCC): ICD-10-CM

## 2018-10-08 DIAGNOSIS — R26.9 ABNORMALITY OF GAIT AND MOBILITY: ICD-10-CM

## 2018-10-08 DIAGNOSIS — I48.91 ATRIAL FIBRILLATION WITH RVR (HCC): ICD-10-CM

## 2018-10-08 DIAGNOSIS — R53.1 GENERALIZED WEAKNESS: ICD-10-CM

## 2018-10-08 DIAGNOSIS — Z23 NEED FOR INFLUENZA VACCINATION: ICD-10-CM

## 2018-10-08 PROCEDURE — G8427 DOCREV CUR MEDS BY ELIG CLIN: HCPCS | Performed by: FAMILY MEDICINE

## 2018-10-08 PROCEDURE — G8482 FLU IMMUNIZE ORDER/ADMIN: HCPCS | Performed by: FAMILY MEDICINE

## 2018-10-08 PROCEDURE — G0008 ADMIN INFLUENZA VIRUS VAC: HCPCS | Performed by: FAMILY MEDICINE

## 2018-10-08 PROCEDURE — 99213 OFFICE O/P EST LOW 20 MIN: CPT | Performed by: FAMILY MEDICINE

## 2018-10-08 PROCEDURE — 90662 IIV NO PRSV INCREASED AG IM: CPT | Performed by: FAMILY MEDICINE

## 2018-10-08 PROCEDURE — 1036F TOBACCO NON-USER: CPT | Performed by: FAMILY MEDICINE

## 2018-10-08 PROCEDURE — 1123F ACP DISCUSS/DSCN MKR DOCD: CPT | Performed by: FAMILY MEDICINE

## 2018-10-08 PROCEDURE — G8417 CALC BMI ABV UP PARAM F/U: HCPCS | Performed by: FAMILY MEDICINE

## 2018-10-08 PROCEDURE — 1101F PT FALLS ASSESS-DOCD LE1/YR: CPT | Performed by: FAMILY MEDICINE

## 2018-10-08 PROCEDURE — 4040F PNEUMOC VAC/ADMIN/RCVD: CPT | Performed by: FAMILY MEDICINE

## 2018-10-08 ASSESSMENT — ENCOUNTER SYMPTOMS
SHORTNESS OF BREATH: 0
RESPIRATORY NEGATIVE: 1
ALLERGIC/IMMUNOLOGIC NEGATIVE: 1
GASTROINTESTINAL NEGATIVE: 1
EYES NEGATIVE: 1

## 2018-10-08 NOTE — PROGRESS NOTES
Patient is seen in follow up for   Chief Complaint   Patient presents with    Hypertension     3 month follow up on HTN and A fib    Health Maintenance     DECLINES     Flu Vaccine     given today      Hypertension   This is a chronic problem. The current episode started more than 1 year ago. The problem is unchanged. The problem is controlled. Pertinent negatives include no chest pain, palpitations or shortness of breath. There are no associated agents to hypertension. Risk factors for coronary artery disease include male gender and family history. Past treatments include angiotensin blockers. The current treatment provides moderate improvement. Past Medical History:   Diagnosis Date    Atrial fibrillation ([address]     BPH (benign prostatic hypertrophy)     Fistula     Glaucoma     Hypertension      Patient Active Problem List    Diagnosis Date Noted    Acute systolic CHF (congestive heart failure), NYHA class 4 ([address] 06/19/2018     Priority: High    Essential hypertension 06/19/2018     Priority: High    History of cholecystectomy 06/25/2018    Glaucoma 06/25/2018    Abnormality of gait and mobility due to Pneumonia and altered cariopulmonary with Generalized Weakness. [facility] admit 06/20/18. 06/25/2018    Pneumonia 06/21/2018    Generalized weakness 06/20/2018    Atrial fibrillation with RVR ([address] 06/11/2018    H/O total knee replacement 12/01/2015    Compression fracture of vertebra with routine healing 08/28/2014    Fistula     Benign prostatic hyperplasia     Hypertension      Past Surgical History:   Procedure Laterality Date    AV FISTULA REPAIR      CHOLECYSTECTOMY      [place] E[place] TRANSORAL BIOPSY SINGLE/MULTIPLE N/A 6/28/2018    EGD performed by Ayana Carpenter DO at [facility]     No family history on file.   Social History     Social History    Marital status:      Spouse name: N/A    Number of children: N/A    Years of education: N/A     Social History Main Topics   

## 2018-10-16 ENCOUNTER — HOSPITAL ENCOUNTER (OUTPATIENT)
Dept: PHYSICAL THERAPY | Age: 83
Setting detail: THERAPIES SERIES
Discharge: HOME | End: 2018-10-16
Payer: MEDICARE

## 2018-10-16 PROCEDURE — G8978 MOBILITY CURRENT STATUS: HCPCS

## 2018-10-16 PROCEDURE — 97162 PT EVAL MOD COMPLEX 30 MIN: CPT

## 2018-10-16 PROCEDURE — G8979 MOBILITY GOAL STATUS: HCPCS

## 2018-10-16 ASSESSMENT — PAIN DESCRIPTION - LOCATION: LOCATION: KNEE

## 2018-10-16 ASSESSMENT — PAIN DESCRIPTION - ORIENTATION: ORIENTATION: RIGHT;LEFT

## 2018-10-16 ASSESSMENT — PAIN SCALES - GENERAL: PAINLEVEL_OUTOF10: 3

## 2018-10-22 ENCOUNTER — HOSPITAL ENCOUNTER (OUTPATIENT)
Dept: PHYSICAL THERAPY | Age: 83
Setting detail: THERAPIES SERIES
Discharge: HOME | End: 2018-10-22
Payer: MEDICARE

## 2018-10-22 PROCEDURE — 97116 GAIT TRAINING THERAPY: CPT

## 2018-10-22 PROCEDURE — 97112 NEUROMUSCULAR REEDUCATION: CPT

## 2018-10-22 PROCEDURE — 97110 THERAPEUTIC EXERCISES: CPT

## 2018-10-22 ASSESSMENT — PAIN DESCRIPTION - LOCATION: LOCATION: KNEE

## 2018-10-22 ASSESSMENT — PAIN DESCRIPTION - ORIENTATION: ORIENTATION: RIGHT;LEFT

## 2018-10-22 ASSESSMENT — PAIN SCALES - GENERAL: PAINLEVEL_OUTOF10: 5

## 2018-10-25 ENCOUNTER — HOSPITAL ENCOUNTER (OUTPATIENT)
Dept: PHYSICAL THERAPY | Age: 83
Setting detail: THERAPIES SERIES
Discharge: HOME | End: 2018-10-25
Payer: MEDICARE

## 2018-10-25 PROCEDURE — 97112 NEUROMUSCULAR REEDUCATION: CPT

## 2018-10-25 PROCEDURE — 97116 GAIT TRAINING THERAPY: CPT

## 2018-10-25 PROCEDURE — 97110 THERAPEUTIC EXERCISES: CPT

## 2018-10-29 ENCOUNTER — HOSPITAL ENCOUNTER (OUTPATIENT)
Dept: PHYSICAL THERAPY | Age: 83
Setting detail: THERAPIES SERIES
Discharge: HOME | End: 2018-10-29
Payer: MEDICARE

## 2018-10-29 PROCEDURE — 97112 NEUROMUSCULAR REEDUCATION: CPT

## 2018-10-29 PROCEDURE — 97535 SELF CARE MNGMENT TRAINING: CPT

## 2018-10-29 PROCEDURE — 97110 THERAPEUTIC EXERCISES: CPT

## 2018-10-29 ASSESSMENT — PAIN DESCRIPTION - ORIENTATION: ORIENTATION: RIGHT;LEFT;LOWER

## 2018-10-29 ASSESSMENT — PAIN SCALES - GENERAL: PAINLEVEL_OUTOF10: 7

## 2018-10-29 ASSESSMENT — PAIN DESCRIPTION - DESCRIPTORS: DESCRIPTORS: ACHING

## 2018-10-29 ASSESSMENT — PAIN DESCRIPTION - LOCATION: LOCATION: BACK;KNEE

## 2018-10-29 NOTE — PROGRESS NOTES
[address] [address]  Outpatient Physical Therapy    Treatment Note        Date: 10/29/2018  Patient: Taty Darnell  : 3/7/1931  ACCT #: [de-identified]  Referring Practitioner: Dr. 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 HEP Compliance:  [] Good [x] Fair [] Poor [] Reports not doing due to:    Vital Signs  Patient Currently in Pain: Yes   Pain Screening  Patient Currently in Pain: Yes  Pain Assessment  Pain Assessment: 0-10  Pain Level: 7  Pain Location: Back;Knee  Pain Orientation: Right;Left;Lower  Pain Descriptors: Aching    OBJECTIVE:   Exercises  Exercise 1: Doorway str 20\" x 3  Exercise 2: Posture ex x 10 , shrugs, rolls, scap retract 5s x 10  Exercise 5: Rows/lats x 10, standing,OTB  Exercise 7: Single stepping over SC x 10, robbie  Exercise 8: Gait drills  F/R/L/march and , with focus on foot clearance, gait ladder with focus on step length, F/L  Exercise 11: 4\" alt toe taps , 1-2 HHA  Exercise 12: 4\" box in /out x 10  Exercise 13: 5x STS with ue's  Exercise 14: ant pelvic tilts x 5, partial STS x 5 with ue's     Ambulation 1  Surface: carpet  Device: Single point cane  Assistance: Independent, Supervision  Quality of Gait: Significantly FWD flexed,dec'd step length, VC's for inc'd focusing onfoot clearance and step length increase  Distance: 100'       Transfers  Comment: 5 x STS with Robbie ue's, vc's for technique    *Indicates exercise, modality, or manual techniques to be initiated when appropriate    Assessment:         Body structures, Functions, Activity limitations: Decreased functional

## 2018-11-01 ENCOUNTER — HOSPITAL ENCOUNTER (OUTPATIENT)
Dept: PHYSICAL THERAPY | Age: 83
Setting detail: THERAPIES SERIES
Discharge: HOME | End: 2018-11-01
Payer: MEDICARE

## 2018-11-01 PROCEDURE — 97112 NEUROMUSCULAR REEDUCATION: CPT

## 2018-11-01 PROCEDURE — 97535 SELF CARE MNGMENT TRAINING: CPT

## 2018-11-01 PROCEDURE — 97110 THERAPEUTIC EXERCISES: CPT

## 2018-11-01 ASSESSMENT — PAIN SCALES - GENERAL: PAINLEVEL_OUTOF10: 5

## 2018-11-01 ASSESSMENT — PAIN DESCRIPTION - LOCATION: LOCATION: KNEE;BACK

## 2018-11-01 ASSESSMENT — PAIN DESCRIPTION - ORIENTATION: ORIENTATION: RIGHT;LEFT;LOWER

## 2018-11-01 ASSESSMENT — PAIN DESCRIPTION - PAIN TYPE: TYPE: CHRONIC PAIN

## 2018-11-05 ENCOUNTER — HOSPITAL ENCOUNTER (OUTPATIENT)
Dept: PHYSICAL THERAPY | Age: 83
Setting detail: THERAPIES SERIES
Discharge: HOME | End: 2018-11-05
Payer: MEDICARE

## 2018-11-08 ENCOUNTER — HOSPITAL ENCOUNTER (OUTPATIENT)
Dept: PHYSICAL THERAPY | Age: 83
Setting detail: THERAPIES SERIES
Discharge: HOME | End: 2018-11-08
Payer: MEDICARE

## 2018-11-08 PROCEDURE — 97110 THERAPEUTIC EXERCISES: CPT

## 2018-11-08 PROCEDURE — 97112 NEUROMUSCULAR REEDUCATION: CPT

## 2018-11-08 PROCEDURE — 97140 MANUAL THERAPY 1/> REGIONS: CPT

## 2018-11-08 ASSESSMENT — PAIN DESCRIPTION - DESCRIPTORS: DESCRIPTORS: ACHING;SORE

## 2018-11-08 ASSESSMENT — PAIN DESCRIPTION - LOCATION: LOCATION: SHOULDER;NECK;RIB CAGE

## 2018-11-08 ASSESSMENT — PAIN DESCRIPTION - ORIENTATION: ORIENTATION: LEFT

## 2018-11-08 ASSESSMENT — PAIN SCALES - GENERAL: PAINLEVEL_OUTOF10: 5

## 2018-11-08 NOTE — PROGRESS NOTES
[address] [address]  Outpatient Physical Therapy    Treatment Note        Date: 2018  Patient: Taty Darnell  : 3/7/1931  ACCT #: [de-identified]  Referring Practitioner: Dr. 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 Used chair to pull himself up. HEP Compliance:  [x] Good [] Fair [] Poor [] Reports not doing due to:    Vital Signs  Patient Currently in Pain: Yes   Pain Screening  Patient Currently in Pain: Yes  Pain Assessment  Pain Assessment: 0-10  Pain Level: 5  Pain Location: Shoulder;Neck;Rib cage  Pain Orientation: Left  Pain Descriptors: Aching; Sore    OBJECTIVE:   Exercises  Exercise 2: Posture ex x 10 , shrugs, rolls, scap retract 5s x 10, Lt UT str 30\" x 3  Exercise 6: Static standing- partial tandem approx 30\" mayra, FT approx 1 min, no lob  Exercise 8: Gait drills  F/R/L/march and , with focus on foot clearance  Exercise 12: 6\" box in /out x 10, alternating feet  Exercise 17: 90 deg turns, L/R, spotting with turns  Exercise 18: Gait Ladder F/L with 2 ue support  Exercise 20: HEP:1/4 turns, turns with spotting        Manual:   Manual therapy  Soft Tissue Mobalization: STM, TPR Lt UT X 8 min to decrease tightness and pain    Modalities:  Modalities  Moist heat: MH cervical and Thoracic  to decrease pain from fall x      *Indicates exercise, modality, or manual techniques to be initiated when appropriate    Assessment:         Body structures, Functions, Activity limitations: Decreased functional mobility , Decreased ROM, Decreased strength,

## 2018-11-12 ENCOUNTER — HOSPITAL ENCOUNTER (OUTPATIENT)
Dept: PHYSICAL THERAPY | Age: 83
Setting detail: THERAPIES SERIES
Discharge: HOME | End: 2018-11-12
Payer: MEDICARE

## 2018-11-12 PROCEDURE — 97110 THERAPEUTIC EXERCISES: CPT

## 2018-11-12 PROCEDURE — 97112 NEUROMUSCULAR REEDUCATION: CPT

## 2018-11-12 PROCEDURE — 97116 GAIT TRAINING THERAPY: CPT

## 2018-11-12 ASSESSMENT — PAIN DESCRIPTION - PAIN TYPE: TYPE: CHRONIC PAIN

## 2018-11-12 ASSESSMENT — PAIN DESCRIPTION - LOCATION: LOCATION: BACK;KNEE

## 2018-11-12 ASSESSMENT — PAIN DESCRIPTION - ORIENTATION: ORIENTATION: LOWER;RIGHT;LEFT

## 2018-11-12 ASSESSMENT — PAIN SCALES - GENERAL: PAINLEVEL_OUTOF10: 7

## 2018-11-12 NOTE — PROGRESS NOTES
pain in knees and back primarily. Pt reports feeling inc'd confidence with his abilities. MH applied to LB while performing seated ex's to decrease back pain. , with pain reduced reported. Treatment Diagnosis: Abnormality of gait          Goals:       Long term goals  Time Frame for Long term goals : 4-5 weeks  Long term goal 1: Improve mayra LE strength and core strength to >/= 4+/5 to improve standing tolerance and improve upright posture. Long term goal 2: Pt will be able to ambulate with LRD with decreased deviations and improved posture by >/= 50% 200' S/I. Long term goal 3: Rangel >/= 45/56 to reduce risk for further falls. Long term goal 4: Pt will demonstrate improve upright sitting and standing posture to reduce c/o LBP. Long term goal 5: Pt will be independent with all transfers with decreased reliance on UEs. Progress toward goals:balance , postural strength    POST-PAIN       Pain Rating (0-10 pain scale):   3-4/10   Location and pain description same as pre-treatment unless indicated. Action: [] NA   [] Perform HEP  [x] Meds as prescribed  [x] Modalities as prescribed   [] Call Physician     Frequency/Duration:  Plan  Times per week: 2  Plan weeks: 4-5  Current Treatment Recommendations: Strengthening, ROM, Balance Training, Functional Mobility Training, Transfer Training, Gait Training, Stair training, Neuromuscular Re-education, Manual Therapy - Soft Tissue Mobilization, Home Exercise Program, Patient/Caregiver Education & Training, Equipment Evaluation, Education, & procurement, Modalities     Pt to continue current HEP. See objective section for any therapeutic exercise changes, additions or modifications this date.          PT Individual Minutes  Time In: 9346  Time Out: 1400  Minutes: 60   Neuro re ed x 34  Gait x 8  TE x 10  MH with seated exercise x 8 min  Signature:  Electronically signed by Kaz Potter PTA on 11/12/18 at 12:07 PM

## 2018-11-15 ENCOUNTER — HOSPITAL ENCOUNTER (OUTPATIENT)
Dept: PHYSICAL THERAPY | Age: 83
Setting detail: THERAPIES SERIES
Discharge: HOME | End: 2018-11-15
Payer: MEDICARE

## 2018-11-15 NOTE — DISCHARGE SUMMARY
[address]     Ph: [phone]  Fax: [phone]    [] Certification  [] Recertification []  Plan of Care  [] Progress Note [x] Discharge      To:  Referring Practitioner: Dr. 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 NT d/t unexpected D/C [] yes  [] no   Long term goal 2: Pt will be able to ambulate with LRD with decreased deviations and improved posture by >/= 50% 200' S/I. Ambulation 1  Surface: carpet  Device: Single point cane  Assistance: Modified Independent, Supervision  Quality of Gait: Significantly FWD flexed,improving step length, VC's for inc'd focusing on foot clearance and step length increase, as well as heel strike. Shuffling with fatigue towards end. Distance: [de-identified]'  (Measured 18) [] yes  [x] no   Long term goal 3: Ragnel >/= 45/56 to reduce risk for further falls. Rangel/56 (measured 18) Pt w/ 1 fall since beginning PT. [] yes  [x] no   Long term goal 4: Pt will demonstrate improve upright sitting and standing posture to reduce c/o LBP. On 18, pt reporting 7/10 LB and knee pain. Pt cont to require cues for upright posture. [] yes  [x] no   Long term goal 5: Pt will be independent with all transfers with decreased reliance on UEs.  NT d/t unexpected D/C [] yes  [] no        Assessment: Strength and

## 2018-11-15 NOTE — PROGRESS NOTES
[facility]       Physical Therapy  Cancellation/No-show Note  Patient Name:  Saritha Martinez  :  3/7/1931   Date:  11/15/2018  Referring Practitioner: Dr. 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    Visit Information:  PT Visit Information  PT Insurance Information: Medicare  Total # of Visits to Date: 7  Plan of Care/Certification Expiration Date: 18  No Show: 0  Canceled Appointment: 2  Progress Note Counter: 7/10 cx 11/15/18    For today's appointment patient:  [x]  Cancelled  []  Rescheduled appointment  []  No-show   []  Called pt to remind of next appointment     Reason given by patient:  []  Patient ill  []  Conflicting appointment  []  No transportation    []  Conflict with work  []  No reason given  [x]  Other:  Weather/pt would also like to be D/C'd today and attempt HEP on own for now.     Signature: Electronically signed by Gutierrez Boggs PTA on 11/15/18 at 11:00 AM

## 2018-11-27 ENCOUNTER — CARE COORDINATION (OUTPATIENT)
Dept: CARE COORDINATION | Age: 83
End: 2018-11-27

## 2018-12-05 ENCOUNTER — CARE COORDINATION (OUTPATIENT)
Dept: CARE COORDINATION | Age: 83
End: 2018-12-05

## 2018-12-05 NOTE — CARE COORDINATION
[facility]) Tele-Care    Patient: Satya Hawkinsportia Patient : 3/7/1931    PCP: Tyesha Kenyon MD        Spoke with Satya Munoz, introduced [facility] program. Patient is No Interest

## 2019-01-10 ENCOUNTER — OFFICE VISIT (OUTPATIENT)
Dept: INTERNAL MEDICINE CLINIC | Age: 84
End: 2019-01-10
Payer: MEDICARE

## 2019-01-10 VITALS
BODY MASS INDEX: 27.28 KG/M2 | HEART RATE: 69 BPM | WEIGHT: 180 LBS | OXYGEN SATURATION: 97 % | RESPIRATION RATE: 16 BRPM | DIASTOLIC BLOOD PRESSURE: 76 MMHG | TEMPERATURE: 97.7 F | SYSTOLIC BLOOD PRESSURE: 128 MMHG | HEIGHT: 68 IN

## 2019-01-10 DIAGNOSIS — I10 ESSENTIAL HYPERTENSION: ICD-10-CM

## 2019-01-10 DIAGNOSIS — I48.91 ATRIAL FIBRILLATION WITH RVR (HCC): Primary | ICD-10-CM

## 2019-01-10 DIAGNOSIS — I50.21 ACUTE SYSTOLIC CHF (CONGESTIVE HEART FAILURE), NYHA CLASS 4 (HCC): ICD-10-CM

## 2019-01-10 PROCEDURE — G8427 DOCREV CUR MEDS BY ELIG CLIN: HCPCS | Performed by: FAMILY MEDICINE

## 2019-01-10 PROCEDURE — G8482 FLU IMMUNIZE ORDER/ADMIN: HCPCS | Performed by: FAMILY MEDICINE

## 2019-01-10 PROCEDURE — 1101F PT FALLS ASSESS-DOCD LE1/YR: CPT | Performed by: FAMILY MEDICINE

## 2019-01-10 PROCEDURE — 4040F PNEUMOC VAC/ADMIN/RCVD: CPT | Performed by: FAMILY MEDICINE

## 2019-01-10 PROCEDURE — 1036F TOBACCO NON-USER: CPT | Performed by: FAMILY MEDICINE

## 2019-01-10 PROCEDURE — 1123F ACP DISCUSS/DSCN MKR DOCD: CPT | Performed by: FAMILY MEDICINE

## 2019-01-10 PROCEDURE — G8417 CALC BMI ABV UP PARAM F/U: HCPCS | Performed by: FAMILY MEDICINE

## 2019-01-10 PROCEDURE — 99214 OFFICE O/P EST MOD 30 MIN: CPT | Performed by: FAMILY MEDICINE

## 2019-01-10 RX ORDER — BRIMONIDINE TARTRATE/TIMOLOL 0.2%-0.5%
DROPS OPHTHALMIC (EYE)
Refills: 12 | COMMUNITY
Start: 2019-01-02

## 2019-01-10 RX ORDER — LATANOPROST 50 UG/ML
SOLUTION/ DROPS OPHTHALMIC
Refills: 2 | COMMUNITY
Start: 2018-10-18

## 2019-01-10 ASSESSMENT — ENCOUNTER SYMPTOMS
RESPIRATORY NEGATIVE: 1
GASTROINTESTINAL NEGATIVE: 1
SHORTNESS OF BREATH: 0
ALLERGIC/IMMUNOLOGIC NEGATIVE: 1
EYES NEGATIVE: 1

## 2019-03-11 ENCOUNTER — HOSPITAL ENCOUNTER (OUTPATIENT)
Dept: PULMONOLOGY | Age: 84
Discharge: HOME | End: 2019-03-11
Payer: MEDICARE

## 2019-03-11 PROCEDURE — 94729 DIFFUSING CAPACITY: CPT

## 2019-03-11 PROCEDURE — 94726 PLETHYSMOGRAPHY LUNG VOLUMES: CPT

## 2019-03-11 PROCEDURE — 94010 BREATHING CAPACITY TEST: CPT

## 2019-03-11 PROCEDURE — 94726 PLETHYSMOGRAPHY LUNG VOLUMES: CPT | Performed by: INTERNAL MEDICINE

## 2019-03-11 PROCEDURE — 94729 DIFFUSING CAPACITY: CPT | Performed by: INTERNAL MEDICINE

## 2019-03-11 PROCEDURE — 94010 BREATHING CAPACITY TEST: CPT | Performed by: INTERNAL MEDICINE

## 2019-04-08 ENCOUNTER — OFFICE VISIT (OUTPATIENT)
Dept: INTERNAL MEDICINE CLINIC | Age: 84
End: 2019-04-08
Payer: MEDICARE

## 2019-04-08 VITALS
DIASTOLIC BLOOD PRESSURE: 80 MMHG | BODY MASS INDEX: 27.74 KG/M2 | OXYGEN SATURATION: 99 % | TEMPERATURE: 97.3 F | WEIGHT: 183 LBS | RESPIRATION RATE: 14 BRPM | HEIGHT: 68 IN | SYSTOLIC BLOOD PRESSURE: 132 MMHG | HEART RATE: 71 BPM

## 2019-04-08 DIAGNOSIS — N40.1 BENIGN PROSTATIC HYPERPLASIA WITH LOWER URINARY TRACT SYMPTOMS, SYMPTOM DETAILS UNSPECIFIED: Primary | ICD-10-CM

## 2019-04-08 DIAGNOSIS — N40.1 BENIGN PROSTATIC HYPERPLASIA WITH LOWER URINARY TRACT SYMPTOMS, SYMPTOM DETAILS UNSPECIFIED: ICD-10-CM

## 2019-04-08 DIAGNOSIS — I48.91 ATRIAL FIBRILLATION WITH RVR (HCC): ICD-10-CM

## 2019-04-08 LAB
BILIRUBIN, POC: NORMAL
BLOOD URINE, POC: NORMAL
CLARITY, POC: CLEAR
COLOR, POC: YELLOW
GLUCOSE URINE, POC: NORMAL
KETONES, POC: NORMAL
LEUKOCYTE EST, POC: NORMAL
NITRITE, POC: NORMAL
PH, POC: 6
PROTEIN, POC: NORMAL
SPECIFIC GRAVITY, POC: 1.02
UROBILINOGEN, POC: NORMAL

## 2019-04-08 PROCEDURE — 1123F ACP DISCUSS/DSCN MKR DOCD: CPT | Performed by: FAMILY MEDICINE

## 2019-04-08 PROCEDURE — G8427 DOCREV CUR MEDS BY ELIG CLIN: HCPCS | Performed by: FAMILY MEDICINE

## 2019-04-08 PROCEDURE — 99213 OFFICE O/P EST LOW 20 MIN: CPT | Performed by: FAMILY MEDICINE

## 2019-04-08 PROCEDURE — G8417 CALC BMI ABV UP PARAM F/U: HCPCS | Performed by: FAMILY MEDICINE

## 2019-04-08 PROCEDURE — 1036F TOBACCO NON-USER: CPT | Performed by: FAMILY MEDICINE

## 2019-04-08 PROCEDURE — 81003 URINALYSIS AUTO W/O SCOPE: CPT | Performed by: FAMILY MEDICINE

## 2019-04-08 PROCEDURE — 4040F PNEUMOC VAC/ADMIN/RCVD: CPT | Performed by: FAMILY MEDICINE

## 2019-04-08 ASSESSMENT — PATIENT HEALTH QUESTIONNAIRE - PHQ9
SUM OF ALL RESPONSES TO PHQ9 QUESTIONS 1 & 2: 0
2. FEELING DOWN, DEPRESSED OR HOPELESS: 0
SUM OF ALL RESPONSES TO PHQ QUESTIONS 1-9: 0
1. LITTLE INTEREST OR PLEASURE IN DOING THINGS: 0
SUM OF ALL RESPONSES TO PHQ QUESTIONS 1-9: 0

## 2019-04-08 ASSESSMENT — ENCOUNTER SYMPTOMS
SHORTNESS OF BREATH: 0
GASTROINTESTINAL NEGATIVE: 1
RESPIRATORY NEGATIVE: 1
ALLERGIC/IMMUNOLOGIC NEGATIVE: 1
EYES NEGATIVE: 1

## 2019-04-08 NOTE — PROGRESS NOTES
Patient is seen in follow up for   Chief Complaint   Patient presents with    Urinary Retention     Patient here complaining of urnie urgency but when he goes he only goes a little bit. States he is getting up frequently at night but unable to go. HPI here for follow up feeling well except for urine difficulty     Past Medical History:   Diagnosis Date    Atrial fibrillation ([address]     BPH (benign prostatic hypertrophy)     Fistula     Glaucoma     Hypertension      Patient Active Problem List    Diagnosis Date Noted    Acute systolic CHF (congestive heart failure), NYHA class 4 ([address] 06/19/2018     Priority: High    Essential hypertension 06/19/2018     Priority: High    SOB (shortness of breath)     History of cholecystectomy 06/25/2018    Glaucoma 06/25/2018    Abnormality of gait and mobility due to Pneumonia and altered cariopulmonary with Generalized Weakness. [facility] admit 06/20/18. 06/25/2018    Pneumonia 06/21/2018    Generalized weakness 06/20/2018    Atrial fibrillation with RVR ([address] 06/11/2018    H/O total knee replacement 12/01/2015    Compression fracture of vertebra with routine healing 08/28/2014    Fistula     Benign prostatic hyperplasia     Hypertension      Past Surgical History:   Procedure Laterality Date    AV FISTULA REPAIR      CHOLECYSTECTOMY      [place] EGD TRANSORAL BIOPSY SINGLE/MULTIPLE N/A 6/28/2018    EGD performed by Shira Lambert DO at [facility]     No family history on file.   Social History     Socioeconomic History    Marital status:      Spouse name: None    Number of children: None    Years of education: None    Highest education level: None   Occupational History    None   Social Needs    Financial resource strain: None    Food insecurity:     Worry: None     Inability: None    Transportation needs:     Medical: None     Non-medical: None   Tobacco Use    Smoking status: Never Smoker    Smokeless tobacco: Never Used   Substance and Sexual Activity    Alcohol use: Yes     Comment: social    Drug use: No    Sexual activity: None   Lifestyle    Physical activity:     Days per week: None     Minutes per session: None    Stress: None   Relationships    Social connections:     Talks on phone: None     Gets together: None     Attends Yarsani service: None     Active member of club or organization: None     Attends meetings of clubs or organizations: None     Relationship status: None    Intimate partner violence:     Fear of current or ex partner: None     Emotionally abused: None     Physically abused: None     Forced sexual activity: None   Other Topics Concern    None   Social History Narrative    None     Current Outpatient Medications   Medication Sig Dispense Refill    Mirabegron ER (MYRBETRIQ) 50 MG TB24 Take 50 mg by mouth Daily 30 tablet 3    COMBIGAN 0.2-0.5 % ophthalmic solution INT 1 GTT INTO OU BID  12    latanoprost (XALATAN) 0.005 % ophthalmic solution INT 1 GTT IN OU QHS UTD  2    levothyroxine (SYNTHROID) 25 MCG tablet Take 1 tablet by mouth Daily 30 tablet 5    amiodarone (CORDARONE) 200 MG tablet Take 1 tablet by mouth daily 30 tablet 3    apixaban (ELIQUIS) 5 MG TABS tablet Take 1 tablet by mouth 2 times daily 60 tablet 1    losartan (COZAAR) 25 MG tablet Take 1 tablet by mouth daily 30 tablet 3     No current facility-administered medications for this visit.       Current Outpatient Medications on File Prior to Visit   Medication Sig Dispense Refill    COMBIGAN 0.2-0.5 % ophthalmic solution INT 1 GTT INTO OU BID  12    latanoprost (XALATAN) 0.005 % ophthalmic solution INT 1 GTT IN OU QHS UTD  2    levothyroxine (SYNTHROID) 25 MCG tablet Take 1 tablet by mouth Daily 30 tablet 5    amiodarone (CORDARONE) 200 MG tablet Take 1 tablet by mouth daily 30 tablet 3    apixaban (ELIQUIS) 5 MG TABS tablet Take 1 tablet by mouth 2 times daily 60 tablet 1    losartan (COZAAR) 25 MG tablet Take 1 tablet by mouth daily 30 tablet 3     No current facility-administered medications on file prior to visit. Allergies   Allergen Reactions    Sulfa Antibiotics      Health Maintenance   Topic Date Due    DTaP/Tdap/Td vaccine (1 - Tdap) 05/17/2019 (Originally 3/7/1950)    Shingles Vaccine (1 of 2) 05/17/2019 (Originally 3/7/1981)    Pneumococcal 65+ years Vaccine (2 of 2 - PPSV23) 05/17/2019    TSH testing  02/25/2020    Potassium monitoring  02/25/2020    Creatinine monitoring  02/25/2020    Flu vaccine  Completed       Review of Systems     Review of Systems   Constitutional: Negative for activity change, appetite change, chills, fever and unexpected weight change. HENT: Negative. Eyes: Negative. Respiratory: Negative. Negative for shortness of breath. Cardiovascular: Negative. Negative for chest pain and palpitations. Gastrointestinal: Negative. Endocrine: Negative. Genitourinary: Positive for decreased urine volume, difficulty urinating and frequency. Musculoskeletal: Negative. Skin: Negative. Allergic/Immunologic: Negative. Neurological: Negative. Hematological: Negative. Psychiatric/Behavioral: Negative. Physical Exam  Vitals:    04/08/19 1547   BP: 132/80   Site: Right Upper Arm   Position: Sitting   Cuff Size: Large Adult   Pulse: 71   Resp: 14   Temp: 97.3 °F (36.3 °C)   TempSrc: Oral   SpO2: 99%   Weight: 183 lb (83 kg)   Height: 5' 8\" (1.727 m)       Physical Exam   Constitutional: He is oriented to person, place, and time. He appears well-developed and well-nourished. HENT:   Right Ear: External ear normal.   Left Ear: External ear normal.   Eyes: Pupils are equal, round, and reactive to light. Conjunctivae and EOM are normal.   Neck: Normal range of motion. Neck supple. No thyromegaly present. Cardiovascular: Normal rate, regular rhythm, normal heart sounds and intact distal pulses. Exam reveals no gallop and no friction rub. No murmur heard.   Pulmonary/Chest: Effort mg by mouth Daily     Dispense:  30 tablet     Refill:  3     Return in about 3 months (around 7/8/2019).   Tabby Parada MD

## 2019-04-10 LAB — URINE CULTURE, ROUTINE: NORMAL

## 2019-07-06 ENCOUNTER — APPOINTMENT (OUTPATIENT)
Dept: CT IMAGING | Age: 84
End: 2019-07-06
Payer: MEDICARE

## 2019-07-06 ENCOUNTER — HOSPITAL ENCOUNTER (EMERGENCY)
Age: 84
Discharge: HOME | End: 2019-07-06
Attending: FAMILY MEDICINE
Payer: MEDICARE

## 2019-07-06 VITALS
WEIGHT: 170 LBS | DIASTOLIC BLOOD PRESSURE: 72 MMHG | SYSTOLIC BLOOD PRESSURE: 127 MMHG | HEART RATE: 62 BPM | OXYGEN SATURATION: 99 % | BODY MASS INDEX: 24.34 KG/M2 | TEMPERATURE: 97.8 F | HEIGHT: 70 IN | RESPIRATION RATE: 18 BRPM

## 2019-07-06 DIAGNOSIS — K59.00 CONSTIPATION, UNSPECIFIED CONSTIPATION TYPE: Primary | ICD-10-CM

## 2019-07-06 DIAGNOSIS — R33.9 URINARY RETENTION WITH INCOMPLETE BLADDER EMPTYING: ICD-10-CM

## 2019-07-06 LAB
ALBUMIN SERPL-MCNC: 4.4 G/DL (ref 3.5–4.6)
ALP BLD-CCNC: 62 U/L (ref 35–104)
ALT SERPL-CCNC: 13 U/L (ref 0–41)
ANION GAP SERPL CALCULATED.3IONS-SCNC: 11 MEQ/L (ref 9–15)
AST SERPL-CCNC: 34 U/L (ref 0–40)
BASOPHILS ABSOLUTE: 0.1 K/UL (ref 0–0.2)
BASOPHILS RELATIVE PERCENT: 0.8 %
BILIRUB SERPL-MCNC: 0.8 MG/DL (ref 0.2–0.7)
BUN BLDV-MCNC: 21 MG/DL (ref 8–23)
CALCIUM SERPL-MCNC: 9.3 MG/DL (ref 8.5–9.9)
CHLORIDE BLD-SCNC: 106 MEQ/L (ref 95–107)
CO2: 23 MEQ/L (ref 20–31)
CREAT SERPL-MCNC: 0.96 MG/DL (ref 0.7–1.2)
EOSINOPHILS ABSOLUTE: 0.2 K/UL (ref 0–0.7)
EOSINOPHILS RELATIVE PERCENT: 2.9 %
GFR AFRICAN AMERICAN: >60
GFR NON-AFRICAN AMERICAN: >60
GLOBULIN: 2.8 G/DL (ref 2.3–3.5)
GLUCOSE BLD-MCNC: 94 MG/DL (ref 70–99)
HCT VFR BLD CALC: 39.7 % (ref 42–52)
HEMOGLOBIN: 13.6 G/DL (ref 14–18)
LACTIC ACID: 1.1 MMOL/L (ref 0.5–2.2)
LIPASE: 24 U/L (ref 12–95)
LYMPHOCYTES ABSOLUTE: 1 K/UL (ref 1–4.8)
LYMPHOCYTES RELATIVE PERCENT: 14.4 %
MCH RBC QN AUTO: 32.1 PG (ref 27–31.3)
MCHC RBC AUTO-ENTMCNC: 34.4 % (ref 33–37)
MCV RBC AUTO: 93.3 FL (ref 80–100)
MONOCYTES ABSOLUTE: 0.5 K/UL (ref 0.2–0.8)
MONOCYTES RELATIVE PERCENT: 7.3 %
NEUTROPHILS ABSOLUTE: 5.4 K/UL (ref 1.4–6.5)
NEUTROPHILS RELATIVE PERCENT: 74.6 %
PDW BLD-RTO: 13.3 % (ref 11.5–14.5)
PLATELET # BLD: 250 K/UL (ref 130–400)
POC CREATININE WHOLE BLOOD: 1.1
POTASSIUM SERPL-SCNC: 5.1 MEQ/L (ref 3.4–4.9)
RBC # BLD: 4.25 M/UL (ref 4.7–6.1)
SODIUM BLD-SCNC: 140 MEQ/L (ref 135–144)
TOTAL PROTEIN: 7.2 G/DL (ref 6.3–8)
WBC # BLD: 7.3 K/UL (ref 4.8–10.8)

## 2019-07-06 PROCEDURE — 74177 CT ABD & PELVIS W/CONTRAST: CPT

## 2019-07-06 PROCEDURE — 85025 COMPLETE CBC W/AUTO DIFF WBC: CPT

## 2019-07-06 PROCEDURE — 6360000004 HC RX CONTRAST MEDICATION: Performed by: PHYSICIAN ASSISTANT

## 2019-07-06 PROCEDURE — 80053 COMPREHEN METABOLIC PANEL: CPT

## 2019-07-06 PROCEDURE — 36415 COLL VENOUS BLD VENIPUNCTURE: CPT

## 2019-07-06 PROCEDURE — 83690 ASSAY OF LIPASE: CPT

## 2019-07-06 PROCEDURE — 83605 ASSAY OF LACTIC ACID: CPT

## 2019-07-06 PROCEDURE — 99284 EMERGENCY DEPT VISIT MOD MDM: CPT

## 2019-07-06 RX ORDER — DOCUSATE SODIUM 100 MG/1
100 CAPSULE, LIQUID FILLED ORAL 2 TIMES DAILY
Qty: 20 CAPSULE | Refills: 0 | Status: SHIPPED | OUTPATIENT
Start: 2019-07-06 | End: 2019-07-16

## 2019-07-06 RX ADMIN — IOPAMIDOL 100 ML: 755 INJECTION, SOLUTION INTRAVENOUS at 15:31

## 2019-07-06 ASSESSMENT — ENCOUNTER SYMPTOMS
ABDOMINAL PAIN: 1
DIARRHEA: 0
CONSTIPATION: 1
SHORTNESS OF BREATH: 0
TROUBLE SWALLOWING: 0
VOMITING: 0
RECTAL PAIN: 1
EYE PAIN: 0
BLOOD IN STOOL: 0
APNEA: 0
COLOR CHANGE: 0
ALLERGIC/IMMUNOLOGIC NEGATIVE: 1

## 2019-07-06 ASSESSMENT — PAIN - FUNCTIONAL ASSESSMENT: PAIN_FUNCTIONAL_ASSESSMENT: PREVENTS OR INTERFERES SOME ACTIVE ACTIVITIES AND ADLS

## 2019-07-06 ASSESSMENT — PAIN DESCRIPTION - FREQUENCY: FREQUENCY: CONTINUOUS

## 2019-07-06 ASSESSMENT — PAIN DESCRIPTION - PAIN TYPE: TYPE: ACUTE PAIN

## 2019-07-06 ASSESSMENT — PAIN SCALES - WONG BAKER: WONGBAKER_NUMERICALRESPONSE: 2

## 2019-07-06 ASSESSMENT — PAIN SCALES - GENERAL: PAINLEVEL_OUTOF10: 9

## 2019-07-06 ASSESSMENT — PAIN DESCRIPTION - DESCRIPTORS: DESCRIPTORS: CRAMPING

## 2019-07-06 ASSESSMENT — PAIN DESCRIPTION - PROGRESSION: CLINICAL_PROGRESSION: GRADUALLY WORSENING

## 2019-07-06 ASSESSMENT — PAIN DESCRIPTION - ONSET: ONSET: ON-GOING

## 2019-07-06 ASSESSMENT — PAIN DESCRIPTION - LOCATION: LOCATION: ABDOMEN

## 2019-07-06 ASSESSMENT — PAIN DESCRIPTION - ORIENTATION: ORIENTATION: MID

## 2019-07-06 NOTE — ED PROVIDER NOTES
improvement in patient's constipation. Patient was able to urinate and bladder scan shows a 200ml residual, and patient declines catheter. Patient will be treated with colace and PCP follow up. MDM    PROCEDURES:    Procedures      FINAL IMPRESSION      1. Constipation, unspecified constipation type    2.  Urinary retention with incomplete bladder emptying          DISPOSITION/PLAN   DISPOSITION Decision To Discharge 07/06/2019 04:51:28 PM      PATIENT REFERRED TO:  Lili Herbert MD  [address] [place] [address]  [phone]    In 2 days        DISCHARGE MEDICATIONS:  New Prescriptions    DOCUSATE SODIUM (COLACE) 100 MG CAPSULE    Take 1 capsule by mouth 2 times daily for 10 days       (Please note that portions of this note were completed with a voice recognition program.  Efforts were made to edit the dictations but occasionally words are mis-transcribed.)    LIZETTE Palacios PA-C  07/06/19 [id number]

## 2019-07-06 NOTE — ED TRIAGE NOTES
Pt reports that he is constipated and has not used the bathroom in about 3 days pt states he took prune juice this a.m what typically works at this time pt states he has rectum pressure and feels as is he may have movment soon pt alert rr even non labored skin wnl abd

## 2019-07-08 LAB
GFR AFRICAN AMERICAN: >60
GFR NON-AFRICAN AMERICAN: >60
PERFORMED ON: NORMAL
POC CREATININE: 1.1 MG/DL (ref 0.8–1.3)
POC SAMPLE TYPE: NORMAL

## 2019-07-15 ENCOUNTER — OFFICE VISIT (OUTPATIENT)
Dept: FAMILY MEDICINE CLINIC | Age: 84
End: 2019-07-15
Payer: MEDICARE

## 2019-07-15 VITALS
DIASTOLIC BLOOD PRESSURE: 78 MMHG | WEIGHT: 170 LBS | SYSTOLIC BLOOD PRESSURE: 130 MMHG | RESPIRATION RATE: 16 BRPM | BODY MASS INDEX: 24.34 KG/M2 | HEART RATE: 60 BPM | HEIGHT: 70 IN | OXYGEN SATURATION: 96 % | TEMPERATURE: 97.3 F

## 2019-07-15 DIAGNOSIS — R33.9 URINARY RETENTION: Primary | ICD-10-CM

## 2019-07-15 DIAGNOSIS — I48.91 ATRIAL FIBRILLATION WITH RVR (HCC): ICD-10-CM

## 2019-07-15 PROCEDURE — 3288F FALL RISK ASSESSMENT DOCD: CPT | Performed by: FAMILY MEDICINE

## 2019-07-15 PROCEDURE — G8420 CALC BMI NORM PARAMETERS: HCPCS | Performed by: FAMILY MEDICINE

## 2019-07-15 PROCEDURE — 1123F ACP DISCUSS/DSCN MKR DOCD: CPT | Performed by: FAMILY MEDICINE

## 2019-07-15 PROCEDURE — 4040F PNEUMOC VAC/ADMIN/RCVD: CPT | Performed by: FAMILY MEDICINE

## 2019-07-15 PROCEDURE — 99213 OFFICE O/P EST LOW 20 MIN: CPT | Performed by: FAMILY MEDICINE

## 2019-07-15 PROCEDURE — G8427 DOCREV CUR MEDS BY ELIG CLIN: HCPCS | Performed by: FAMILY MEDICINE

## 2019-07-15 PROCEDURE — 1036F TOBACCO NON-USER: CPT | Performed by: FAMILY MEDICINE

## 2019-07-15 RX ORDER — OXYBUTYNIN CHLORIDE 5 MG/1
5 TABLET ORAL 3 TIMES DAILY
COMMUNITY
End: 2019-07-15 | Stop reason: SINTOL

## 2019-07-15 ASSESSMENT — ENCOUNTER SYMPTOMS
ALLERGIC/IMMUNOLOGIC NEGATIVE: 1
SHORTNESS OF BREATH: 0
RESPIRATORY NEGATIVE: 1
GASTROINTESTINAL NEGATIVE: 1
EYES NEGATIVE: 1

## 2019-07-15 NOTE — PROGRESS NOTES
and Sexual Activity    Alcohol use: Yes     Comment: social    Drug use: No    Sexual activity: None   Lifestyle    Physical activity:     Days per week: None     Minutes per session: None    Stress: None   Relationships    Social connections:     Talks on phone: None     Gets together: None     Attends Yazidism service: None     Active member of club or organization: None     Attends meetings of clubs or organizations: None     Relationship status: None    Intimate partner violence:     Fear of current or ex partner: None     Emotionally abused: None     Physically abused: None     Forced sexual activity: None   Other Topics Concern    None   Social History Narrative    None     Current Outpatient Medications   Medication Sig Dispense Refill    docusate sodium (COLACE) 100 MG capsule Take 1 capsule by mouth 2 times daily for 10 days 20 capsule 0    COMBIGAN 0.2-0.5 % ophthalmic solution INT 1 GTT INTO OU BID  12    latanoprost (XALATAN) 0.005 % ophthalmic solution INT 1 GTT IN OU QHS UTD  2    levothyroxine (SYNTHROID) 25 MCG tablet Take 1 tablet by mouth Daily 30 tablet 5    amiodarone (CORDARONE) 200 MG tablet Take 1 tablet by mouth daily 30 tablet 3    apixaban (ELIQUIS) 5 MG TABS tablet Take 1 tablet by mouth 2 times daily 60 tablet 1    losartan (COZAAR) 25 MG tablet Take 1 tablet by mouth daily 30 tablet 3     No current facility-administered medications for this visit.       Current Outpatient Medications on File Prior to Visit   Medication Sig Dispense Refill    docusate sodium (COLACE) 100 MG capsule Take 1 capsule by mouth 2 times daily for 10 days 20 capsule 0    COMBIGAN 0.2-0.5 % ophthalmic solution INT 1 GTT INTO OU BID  12    latanoprost (XALATAN) 0.005 % ophthalmic solution INT 1 GTT IN OU QHS UTD  2    levothyroxine (SYNTHROID) 25 MCG tablet Take 1 tablet by mouth Daily 30 tablet 5    amiodarone (CORDARONE) 200 MG tablet Take 1 tablet by mouth daily 30 tablet 3    apixaban

## 2019-09-12 ENCOUNTER — HOSPITAL ENCOUNTER (OUTPATIENT)
Dept: GENERAL RADIOLOGY | Age: 84
Discharge: HOME | End: 2019-09-14
Payer: MEDICARE

## 2019-09-12 ENCOUNTER — HOSPITAL ENCOUNTER (OUTPATIENT)
Dept: PULMONOLOGY | Age: 84
Discharge: HOME | End: 2019-09-12
Payer: MEDICARE

## 2019-09-12 DIAGNOSIS — I48.19 PERSISTENT ATRIAL FIBRILLATION (HCC): ICD-10-CM

## 2019-09-12 DIAGNOSIS — R06.02 SHORTNESS OF BREATH: ICD-10-CM

## 2019-09-12 PROCEDURE — 94010 BREATHING CAPACITY TEST: CPT

## 2019-09-12 PROCEDURE — 94729 DIFFUSING CAPACITY: CPT

## 2019-09-12 PROCEDURE — 94060 EVALUATION OF WHEEZING: CPT | Performed by: INTERNAL MEDICINE

## 2019-09-12 PROCEDURE — 94726 PLETHYSMOGRAPHY LUNG VOLUMES: CPT | Performed by: INTERNAL MEDICINE

## 2019-09-12 PROCEDURE — 94729 DIFFUSING CAPACITY: CPT | Performed by: INTERNAL MEDICINE

## 2019-09-12 PROCEDURE — 71046 X-RAY EXAM CHEST 2 VIEWS: CPT

## 2019-09-12 PROCEDURE — 94726 PLETHYSMOGRAPHY LUNG VOLUMES: CPT

## 2019-09-12 RX ORDER — ALBUTEROL SULFATE 2.5 MG/3ML
2.5 SOLUTION RESPIRATORY (INHALATION) ONCE
Status: DISCONTINUED | OUTPATIENT
Start: 2019-09-12 | End: 2019-09-13 | Stop reason: HOSPADM

## 2019-10-16 ENCOUNTER — NURSE ONLY (OUTPATIENT)
Dept: FAMILY MEDICINE CLINIC | Age: 84
End: 2019-10-16
Payer: MEDICARE

## 2019-10-16 DIAGNOSIS — Z23 NEED FOR INFLUENZA VACCINATION: Primary | ICD-10-CM

## 2019-10-16 PROCEDURE — G0008 ADMIN INFLUENZA VIRUS VAC: HCPCS | Performed by: FAMILY MEDICINE

## 2019-10-16 PROCEDURE — 90653 IIV ADJUVANT VACCINE IM: CPT | Performed by: FAMILY MEDICINE

## 2019-10-23 ENCOUNTER — APPOINTMENT (OUTPATIENT)
Dept: GENERAL RADIOLOGY | Age: 84
End: 2019-10-23
Payer: MEDICARE

## 2019-10-23 ENCOUNTER — HOSPITAL ENCOUNTER (EMERGENCY)
Age: 84
Discharge: HOME | End: 2019-10-23
Payer: MEDICARE

## 2019-10-23 VITALS
OXYGEN SATURATION: 100 % | RESPIRATION RATE: 20 BRPM | WEIGHT: 186 LBS | HEIGHT: 67 IN | SYSTOLIC BLOOD PRESSURE: 148 MMHG | TEMPERATURE: 97.8 F | HEART RATE: 70 BPM | DIASTOLIC BLOOD PRESSURE: 71 MMHG | BODY MASS INDEX: 29.19 KG/M2

## 2019-10-23 DIAGNOSIS — K59.00 CONSTIPATION, UNSPECIFIED CONSTIPATION TYPE: ICD-10-CM

## 2019-10-23 DIAGNOSIS — K56.41 FECAL IMPACTION IN RECTUM (HCC): Primary | ICD-10-CM

## 2019-10-23 PROCEDURE — 74018 RADEX ABDOMEN 1 VIEW: CPT

## 2019-10-23 PROCEDURE — 99284 EMERGENCY DEPT VISIT MOD MDM: CPT

## 2019-10-23 PROCEDURE — 2580000003 HC RX 258: Performed by: PHYSICIAN ASSISTANT

## 2019-10-23 RX ORDER — DOCUSATE SODIUM 100 MG/1
100 CAPSULE, LIQUID FILLED ORAL 2 TIMES DAILY
Qty: 60 CAPSULE | Refills: 0 | Status: SHIPPED | OUTPATIENT
Start: 2019-10-23 | End: 2019-11-22

## 2019-10-23 RX ORDER — 0.9 % SODIUM CHLORIDE 0.9 %
1000 INTRAVENOUS SOLUTION INTRAVENOUS ONCE
Status: COMPLETED | OUTPATIENT
Start: 2019-10-23 | End: 2019-10-23

## 2019-10-23 RX ADMIN — SODIUM CHLORIDE 1000 ML: 9 INJECTION, SOLUTION INTRAVENOUS at 07:44

## 2019-10-23 ASSESSMENT — ENCOUNTER SYMPTOMS
ABDOMINAL PAIN: 1
NAUSEA: 0
COLOR CHANGE: 0
ABDOMINAL DISTENTION: 0
SHORTNESS OF BREATH: 0
VOMITING: 0
DIARRHEA: 0
RHINORRHEA: 0
SORE THROAT: 0
BACK PAIN: 0
EYE DISCHARGE: 0
CONSTIPATION: 1

## 2019-10-23 ASSESSMENT — PAIN DESCRIPTION - DESCRIPTORS: DESCRIPTORS: CRAMPING

## 2019-10-23 ASSESSMENT — PAIN DESCRIPTION - LOCATION: LOCATION: ABDOMEN

## 2019-10-23 ASSESSMENT — PAIN DESCRIPTION - FREQUENCY: FREQUENCY: CONTINUOUS

## 2019-10-23 ASSESSMENT — PAIN SCALES - GENERAL: PAINLEVEL_OUTOF10: 7

## 2019-10-23 ASSESSMENT — PAIN DESCRIPTION - PAIN TYPE: TYPE: ACUTE PAIN

## 2019-10-23 ASSESSMENT — PAIN DESCRIPTION - ORIENTATION: ORIENTATION: RIGHT;LEFT

## 2019-10-23 ASSESSMENT — PAIN DESCRIPTION - PROGRESSION: CLINICAL_PROGRESSION: GRADUALLY WORSENING

## 2019-10-23 ASSESSMENT — PAIN DESCRIPTION - ONSET: ONSET: ON-GOING

## 2019-11-16 ENCOUNTER — OFFICE VISIT (OUTPATIENT)
Dept: FAMILY MEDICINE CLINIC | Age: 84
End: 2019-11-16
Payer: OTHER GOVERNMENT

## 2019-11-16 VITALS
BODY MASS INDEX: 27.9 KG/M2 | HEART RATE: 80 BPM | DIASTOLIC BLOOD PRESSURE: 78 MMHG | SYSTOLIC BLOOD PRESSURE: 124 MMHG | TEMPERATURE: 98 F | OXYGEN SATURATION: 96 % | HEIGHT: 66 IN | WEIGHT: 173.6 LBS | RESPIRATION RATE: 16 BRPM

## 2019-11-16 DIAGNOSIS — I10 ESSENTIAL HYPERTENSION: ICD-10-CM

## 2019-11-16 DIAGNOSIS — I48.91 ATRIAL FIBRILLATION WITH RVR (HCC): Primary | ICD-10-CM

## 2019-11-16 DIAGNOSIS — K59.00 CONSTIPATION, UNSPECIFIED CONSTIPATION TYPE: ICD-10-CM

## 2019-11-16 DIAGNOSIS — R33.9 URINARY RETENTION: ICD-10-CM

## 2019-11-16 PROCEDURE — 1036F TOBACCO NON-USER: CPT | Performed by: FAMILY MEDICINE

## 2019-11-16 PROCEDURE — 4040F PNEUMOC VAC/ADMIN/RCVD: CPT | Performed by: FAMILY MEDICINE

## 2019-11-16 PROCEDURE — G8417 CALC BMI ABV UP PARAM F/U: HCPCS | Performed by: FAMILY MEDICINE

## 2019-11-16 PROCEDURE — G8427 DOCREV CUR MEDS BY ELIG CLIN: HCPCS | Performed by: FAMILY MEDICINE

## 2019-11-16 PROCEDURE — 99214 OFFICE O/P EST MOD 30 MIN: CPT | Performed by: FAMILY MEDICINE

## 2019-11-16 PROCEDURE — G8482 FLU IMMUNIZE ORDER/ADMIN: HCPCS | Performed by: FAMILY MEDICINE

## 2019-11-16 PROCEDURE — 1123F ACP DISCUSS/DSCN MKR DOCD: CPT | Performed by: FAMILY MEDICINE

## 2019-11-16 RX ORDER — TAMSULOSIN HYDROCHLORIDE 0.4 MG/1
0.8 CAPSULE ORAL DAILY
Qty: 180 CAPSULE | Refills: 3 | Status: SHIPPED | OUTPATIENT
Start: 2019-11-16 | End: 2020-12-07 | Stop reason: SDUPTHER

## 2019-12-27 ENCOUNTER — TELEPHONE (OUTPATIENT)
Dept: FAMILY MEDICINE CLINIC | Age: 84
End: 2019-12-27

## 2020-03-11 ENCOUNTER — HOSPITAL ENCOUNTER (OUTPATIENT)
Dept: GENERAL RADIOLOGY | Age: 85
Discharge: HOME | End: 2020-03-13
Payer: MEDICARE

## 2020-03-11 PROCEDURE — 71046 X-RAY EXAM CHEST 2 VIEWS: CPT

## 2020-11-03 PROBLEM — I10 HYPERTENSION: Status: RESOLVED | Noted: 2020-11-03 | Resolved: 2020-11-03

## 2020-12-07 RX ORDER — TAMSULOSIN HYDROCHLORIDE 0.4 MG/1
0.8 CAPSULE ORAL DAILY
Qty: 180 CAPSULE | Refills: 0 | Status: SHIPPED | OUTPATIENT
Start: 2020-12-07 | End: 2021-02-17 | Stop reason: SDUPTHER

## 2020-12-24 ENCOUNTER — TELEPHONE (OUTPATIENT)
Dept: FAMILY MEDICINE CLINIC | Age: 85
End: 2020-12-24

## 2020-12-24 RX ORDER — CEFDINIR 300 MG/1
300 CAPSULE ORAL 2 TIMES DAILY
Qty: 20 CAPSULE | Refills: 0 | Status: SHIPPED | OUTPATIENT
Start: 2020-12-24 | End: 2021-01-03

## 2021-01-19 ENCOUNTER — TELEPHONE (OUTPATIENT)
Dept: FAMILY MEDICINE CLINIC | Age: 86
End: 2021-01-19

## 2021-01-19 ENCOUNTER — OFFICE VISIT (OUTPATIENT)
Dept: FAMILY MEDICINE CLINIC | Age: 86
End: 2021-01-19
Payer: MEDICARE

## 2021-01-19 VITALS
HEART RATE: 59 BPM | TEMPERATURE: 98 F | DIASTOLIC BLOOD PRESSURE: 72 MMHG | SYSTOLIC BLOOD PRESSURE: 116 MMHG | OXYGEN SATURATION: 95 % | BODY MASS INDEX: 28.02 KG/M2 | HEIGHT: 66 IN

## 2021-01-19 DIAGNOSIS — R53.1 GENERALIZED WEAKNESS: ICD-10-CM

## 2021-01-19 DIAGNOSIS — G89.29 CHRONIC MIDLINE LOW BACK PAIN WITHOUT SCIATICA: Primary | ICD-10-CM

## 2021-01-19 DIAGNOSIS — Z91.81 AT HIGH RISK FOR FALLS: ICD-10-CM

## 2021-01-19 DIAGNOSIS — R26.9 ABNORMALITY OF GAIT AND MOBILITY: ICD-10-CM

## 2021-01-19 DIAGNOSIS — M54.50 CHRONIC MIDLINE LOW BACK PAIN WITHOUT SCIATICA: Primary | ICD-10-CM

## 2021-01-19 PROCEDURE — 1036F TOBACCO NON-USER: CPT | Performed by: FAMILY MEDICINE

## 2021-01-19 PROCEDURE — G8417 CALC BMI ABV UP PARAM F/U: HCPCS | Performed by: FAMILY MEDICINE

## 2021-01-19 PROCEDURE — 1123F ACP DISCUSS/DSCN MKR DOCD: CPT | Performed by: FAMILY MEDICINE

## 2021-01-19 PROCEDURE — G8482 FLU IMMUNIZE ORDER/ADMIN: HCPCS | Performed by: FAMILY MEDICINE

## 2021-01-19 PROCEDURE — 99213 OFFICE O/P EST LOW 20 MIN: CPT | Performed by: FAMILY MEDICINE

## 2021-01-19 PROCEDURE — 4040F PNEUMOC VAC/ADMIN/RCVD: CPT | Performed by: FAMILY MEDICINE

## 2021-01-19 PROCEDURE — G8427 DOCREV CUR MEDS BY ELIG CLIN: HCPCS | Performed by: FAMILY MEDICINE

## 2021-01-19 RX ORDER — TRAMADOL HYDROCHLORIDE 50 MG/1
50 TABLET ORAL EVERY 6 HOURS PRN
Qty: 28 TABLET | Refills: 0 | Status: SHIPPED | OUTPATIENT
Start: 2021-01-19 | End: 2021-01-26

## 2021-01-19 SDOH — ECONOMIC STABILITY: FOOD INSECURITY: WITHIN THE PAST 12 MONTHS, THE FOOD YOU BOUGHT JUST DIDN'T LAST AND YOU DIDN'T HAVE MONEY TO GET MORE.: NEVER TRUE

## 2021-01-19 SDOH — ECONOMIC STABILITY: FOOD INSECURITY: WITHIN THE PAST 12 MONTHS, YOU WORRIED THAT YOUR FOOD WOULD RUN OUT BEFORE YOU GOT MONEY TO BUY MORE.: NEVER TRUE

## 2021-01-19 SDOH — ECONOMIC STABILITY: INCOME INSECURITY: HOW HARD IS IT FOR YOU TO PAY FOR THE VERY BASICS LIKE FOOD, HOUSING, MEDICAL CARE, AND HEATING?: NOT HARD AT ALL

## 2021-01-19 SDOH — ECONOMIC STABILITY: TRANSPORTATION INSECURITY
IN THE PAST 12 MONTHS, HAS THE LACK OF TRANSPORTATION KEPT YOU FROM MEDICAL APPOINTMENTS OR FROM GETTING MEDICATIONS?: NO

## 2021-01-19 SDOH — ECONOMIC STABILITY: TRANSPORTATION INSECURITY
IN THE PAST 12 MONTHS, HAS LACK OF TRANSPORTATION KEPT YOU FROM MEETINGS, WORK, OR FROM GETTING THINGS NEEDED FOR DAILY LIVING?: NO

## 2021-01-19 ASSESSMENT — PATIENT HEALTH QUESTIONNAIRE - PHQ9
2. FEELING DOWN, DEPRESSED OR HOPELESS: 1
SUM OF ALL RESPONSES TO PHQ QUESTIONS 1-9: 1
SUM OF ALL RESPONSES TO PHQ9 QUESTIONS 1 & 2: 1
SUM OF ALL RESPONSES TO PHQ QUESTIONS 1-9: 1
1. LITTLE INTEREST OR PLEASURE IN DOING THINGS: 0

## 2021-01-19 ASSESSMENT — ENCOUNTER SYMPTOMS
SHORTNESS OF BREATH: 0
RESPIRATORY NEGATIVE: 1
GASTROINTESTINAL NEGATIVE: 1
EYES NEGATIVE: 1
ALLERGIC/IMMUNOLOGIC NEGATIVE: 1

## 2021-01-19 NOTE — TELEPHONE ENCOUNTER
I am really not able to do that as I did on my own computer for LC. She did have to check with Dr. Velia Howard or one of his people if they have away to get that done for Mr. Cristela Oleary

## 2021-01-19 NOTE — PROGRESS NOTES
Patient is seen in follow up for   Chief Complaint   Patient presents with    Back Pain     John Man a couple months ago. Hurting him when he gets up and walks. No pain when sitting.  Hip Pain     Did not go to hopsital to have XR's done. States that he's pretty stiff. HPIhere for follow up on fall lost his balance. Fell two weeks ago. He also noted frequent urination for the last couple days. Past Medical History:   Diagnosis Date    Atrial fibrillation ([address]     BPH (benign prostatic hypertrophy)     Fistula     Glaucoma     Hypertension      Patient Active Problem List    Diagnosis Date Noted    Acute systolic CHF (congestive heart failure), NYHA class 4 ([address] 06/19/2018     Priority: High    Essential hypertension 06/19/2018     Priority: High    SOB (shortness of breath)     History of cholecystectomy 06/25/2018    Glaucoma 06/25/2018    Abnormality of gait and mobility due to Pneumonia and altered cariopulmonary with Generalized Weakness. [facility] admit 06/20/18. 06/25/2018    Pneumonia 06/21/2018    Generalized weakness 06/20/2018    Atrial fibrillation with RVR ([address] 06/11/2018    H/O total knee replacement 12/01/2015    Compression fracture of vertebra with routine healing 08/28/2014    Fistula     Benign prostatic hyperplasia      Past Surgical History:   Procedure Laterality Date    AV FISTULA REPAIR      CHOLECYSTECTOMY      [place] EGD TRANSORAL BIOPSY SINGLE/MULTIPLE N/A 6/28/2018    EGD performed by Amelia Multani DO at [facility]     No family history on file.   Social History     Socioeconomic History    Marital status:      Spouse name: None    Number of children: None    Years of education: None    Highest education level: None   Occupational History    None   Social Needs    Financial resource strain: Not hard at all   [address] insecurity     Worry: Never true     Inability: Never true   [employer] needs     Medical: No     Non-medical: No   Tobacco Use  Smoking status: Never Smoker    Smokeless tobacco: Never Used   Substance and Sexual Activity    Alcohol use: Yes     Comment: social    Drug use: No    Sexual activity: None   Lifestyle    Physical activity     Days per week: None     Minutes per session: None    Stress: None   Relationships    Social connections     Talks on phone: None     Gets together: None     Attends Yazidi service: None     Active member of club or organization: None     Attends meetings of clubs or organizations: None     Relationship status: None    Intimate partner violence     Fear of current or ex partner: None     Emotionally abused: None     Physically abused: None     Forced sexual activity: None   Other Topics Concern    None   Social History Narrative    None     Current Outpatient Medications   Medication Sig Dispense Refill    traMADol (ULTRAM) 50 MG tablet Take 1 tablet by mouth every 6 hours as needed for Pain for up to 7 days. Intended supply: 7 days. Take lowest dose possible to manage pain 28 tablet 0    tamsulosin (FLOMAX) 0.4 MG capsule Take 2 capsules by mouth daily 180 capsule 0    Handicap Placard MISC by Does not apply route Good for five years 1 each 0    linaclotide (LINZESS) 145 MCG capsule Take 1 capsule by mouth every morning (before breakfast) 30 capsule 5    COMBIGAN 0.2-0.5 % ophthalmic solution INT 1 GTT INTO OU BID  12    latanoprost (XALATAN) 0.005 % ophthalmic solution INT 1 GTT IN OU QHS UTD  2    levothyroxine (SYNTHROID) 25 MCG tablet Take 1 tablet by mouth Daily 30 tablet 5    amiodarone (CORDARONE) 200 MG tablet Take 1 tablet by mouth daily 30 tablet 3    apixaban (ELIQUIS) 5 MG TABS tablet Take 1 tablet by mouth 2 times daily 60 tablet 1    losartan (COZAAR) 25 MG tablet Take 1 tablet by mouth daily 30 tablet 3     No current facility-administered medications for this visit.       Current Outpatient Medications on File Prior to Visit   Medication Sig Dispense Refill    Physical Exam  Vitals:    01/19/21 1136   BP: 116/72   Site: Left Upper Arm   Position: Sitting   Cuff Size: Medium Adult   Pulse: 59   Temp: 98 °F (36.7 °C)   TempSrc: Oral   SpO2: 95%   Height: 5' 6\" (1.676 m)       Physical Exam  Constitutional:       Appearance: He is well-developed. HENT:      Right Ear: External ear normal.      Left Ear: External ear normal.   Eyes:      Conjunctiva/sclera: Conjunctivae normal.      Pupils: Pupils are equal, round, and reactive to light. Neck:      Musculoskeletal: Normal range of motion and neck supple. Thyroid: No thyromegaly. Cardiovascular:      Rate and Rhythm: Normal rate and regular rhythm. Heart sounds: Normal heart sounds. No murmur. No friction rub. No gallop. Pulmonary:      Effort: Pulmonary effort is normal. No respiratory distress. Breath sounds: Normal breath sounds. No wheezing. Abdominal:      General: Bowel sounds are normal. There is no distension. Palpations: Abdomen is soft. There is no mass. Tenderness: There is no abdominal tenderness. There is no guarding or rebound. Hernia: No hernia is present. Genitourinary:     Penis: Normal.    Musculoskeletal: Normal range of motion. General: No tenderness. Lymphadenopathy:      Cervical: No cervical adenopathy. Skin:     General: Skin is warm and dry. Neurological:      Mental Status: He is alert and oriented to person, place, and time. Cranial Nerves: No cranial nerve deficit. Coordination: Coordination normal.     sitting in a wheel chair mild edema noted and weakness noted. Assessment   Diagnosis Orders   1. Chronic midline low back pain without sciatica  traMADol (ULTRAM) 50 MG tablet   2. Generalized weakness     3. Abnormality of gait and mobility due to Pneumonia and altered cariopulmonary with Generalized Weakness. [facility] admit 06/20/18.      4. At high risk for falls       Problem List     Generalized weakness Abnormality of gait and mobility due to Pneumonia and altered cariopulmonary with Generalized Weakness. [facility] admit 06/20/18. Plan  No orders of the defined types were placed in this encounter. Orders Placed This Encounter   Medications    traMADol (ULTRAM) 50 MG tablet     Sig: Take 1 tablet by mouth every 6 hours as needed for Pain for up to 7 days. Intended supply: 7 days. Take lowest dose possible to manage pain     Dispense:  28 tablet     Refill:  0     Reduce doses taken as pain becomes manageable     Return in about 3 months (around 4/19/2021), or if symptoms worsen or fail to improve. Natasha Carty MD          On the basis of positive falls risk screening, assessment and plan is as follows: home safety tips provided.

## 2021-01-20 ENCOUNTER — TELEPHONE (OUTPATIENT)
Dept: FAMILY MEDICINE CLINIC | Age: 86
End: 2021-01-20

## 2021-01-20 NOTE — TELEPHONE ENCOUNTER
I called and registered this patient as a favor to his wife Lakisha Resendiz for the [facility] with [facility] on [place] in [place].

## 2021-01-21 ENCOUNTER — TELEPHONE (OUTPATIENT)
Dept: PRIMARY CARE CLINIC | Age: 86
End: 2021-01-21

## 2021-01-21 NOTE — TELEPHONE ENCOUNTER
Was going to contact patient for Covid vaccine, but patient already scheduled at [facility] per [id number].

## 2021-02-17 RX ORDER — TAMSULOSIN HYDROCHLORIDE 0.4 MG/1
0.8 CAPSULE ORAL DAILY
Qty: 180 CAPSULE | Refills: 3 | Status: SHIPPED | OUTPATIENT
Start: 2021-02-17 | End: 2022-03-11 | Stop reason: SDUPTHER

## 2021-12-02 LAB
ALBUMIN SERPL-MCNC: 4.1 G/DL (ref 3.5–4.6)
ALP BLD-CCNC: 79 U/L (ref 35–104)
ALT SERPL-CCNC: 9 U/L (ref 0–41)
ANION GAP SERPL CALCULATED.3IONS-SCNC: 13 MEQ/L (ref 9–15)
AST SERPL-CCNC: 17 U/L (ref 0–40)
BILIRUB SERPL-MCNC: 0.9 MG/DL (ref 0.2–0.7)
BUN BLDV-MCNC: 20 MG/DL (ref 8–23)
CALCIUM SERPL-MCNC: 9.2 MG/DL (ref 8.5–9.9)
CHLORIDE BLD-SCNC: 102 MEQ/L (ref 95–107)
CO2: 25 MEQ/L (ref 20–31)
CREAT SERPL-MCNC: 1 MG/DL (ref 0.7–1.2)
GFR AFRICAN AMERICAN: >60
GFR NON-AFRICAN AMERICAN: >60
GLOBULIN: 2.9 G/DL (ref 2.3–3.5)
GLUCOSE BLD-MCNC: 81 MG/DL (ref 70–99)
HCT VFR BLD CALC: 37.3 % (ref 42–52)
HEMOGLOBIN: 12.3 G/DL (ref 14–18)
MCH RBC QN AUTO: 30.1 PG (ref 27–31.3)
MCHC RBC AUTO-ENTMCNC: 32.9 % (ref 33–37)
MCV RBC AUTO: 91.4 FL (ref 80–100)
PDW BLD-RTO: 15.3 % (ref 11.5–14.5)
PLATELET # BLD: 268 K/UL (ref 130–400)
POTASSIUM SERPL-SCNC: 4.2 MEQ/L (ref 3.4–4.9)
RBC # BLD: 4.08 M/UL (ref 4.7–6.1)
SODIUM BLD-SCNC: 140 MEQ/L (ref 135–144)
TOTAL PROTEIN: 7 G/DL (ref 6.3–8)
TSH SERPL DL<=0.05 MIU/L-ACNC: 4.18 UIU/ML (ref 0.44–3.86)
WBC # BLD: 6 K/UL (ref 4.8–10.8)

## 2022-03-04 ENCOUNTER — OFFICE VISIT (OUTPATIENT)
Dept: FAMILY MEDICINE CLINIC | Age: 87
End: 2022-03-04
Payer: MEDICARE

## 2022-03-04 VITALS
WEIGHT: 171.8 LBS | SYSTOLIC BLOOD PRESSURE: 118 MMHG | BODY MASS INDEX: 27.61 KG/M2 | DIASTOLIC BLOOD PRESSURE: 72 MMHG | OXYGEN SATURATION: 98 % | TEMPERATURE: 97.2 F | HEIGHT: 66 IN | HEART RATE: 78 BPM

## 2022-03-04 DIAGNOSIS — E03.9 HYPOTHYROIDISM, UNSPECIFIED TYPE: ICD-10-CM

## 2022-03-04 DIAGNOSIS — I10 ESSENTIAL HYPERTENSION: ICD-10-CM

## 2022-03-04 DIAGNOSIS — Z00.00 INITIAL MEDICARE ANNUAL WELLNESS VISIT: Primary | ICD-10-CM

## 2022-03-04 DIAGNOSIS — I87.2 VENOUS INSUFFICIENCY OF BOTH LOWER EXTREMITIES: ICD-10-CM

## 2022-03-04 DIAGNOSIS — I48.91 ATRIAL FIBRILLATION WITH RVR (HCC): Primary | ICD-10-CM

## 2022-03-04 DIAGNOSIS — N40.1 BENIGN PROSTATIC HYPERPLASIA WITH LOWER URINARY TRACT SYMPTOMS, SYMPTOM DETAILS UNSPECIFIED: ICD-10-CM

## 2022-03-04 DIAGNOSIS — H61.23 BILATERAL IMPACTED CERUMEN: ICD-10-CM

## 2022-03-04 DIAGNOSIS — I50.21 ACUTE SYSTOLIC CHF (CONGESTIVE HEART FAILURE), NYHA CLASS 4 (HCC): ICD-10-CM

## 2022-03-04 PROBLEM — I48.19 PERSISTENT ATRIAL FIBRILLATION (HCC): Status: ACTIVE | Noted: 2018-06-11

## 2022-03-04 PROCEDURE — 1036F TOBACCO NON-USER: CPT | Performed by: NURSE PRACTITIONER

## 2022-03-04 PROCEDURE — G8419 CALC BMI OUT NRM PARAM NOF/U: HCPCS | Performed by: NURSE PRACTITIONER

## 2022-03-04 PROCEDURE — G8484 FLU IMMUNIZE NO ADMIN: HCPCS | Performed by: NURSE PRACTITIONER

## 2022-03-04 PROCEDURE — G8427 DOCREV CUR MEDS BY ELIG CLIN: HCPCS | Performed by: NURSE PRACTITIONER

## 2022-03-04 PROCEDURE — 99214 OFFICE O/P EST MOD 30 MIN: CPT | Performed by: NURSE PRACTITIONER

## 2022-03-04 PROCEDURE — G0438 PPPS, INITIAL VISIT: HCPCS | Performed by: NURSE PRACTITIONER

## 2022-03-04 PROCEDURE — 1123F ACP DISCUSS/DSCN MKR DOCD: CPT | Performed by: NURSE PRACTITIONER

## 2022-03-04 PROCEDURE — 4040F PNEUMOC VAC/ADMIN/RCVD: CPT | Performed by: NURSE PRACTITIONER

## 2022-03-04 RX ORDER — AMIODARONE HYDROCHLORIDE 200 MG/1
100 TABLET ORAL DAILY
Qty: 15 TABLET | Refills: 0 | Status: SHIPPED
Start: 2022-03-04

## 2022-03-04 RX ORDER — LOSARTAN POTASSIUM 100 MG/1
TABLET ORAL
COMMUNITY
Start: 2022-02-16 | End: 2022-03-04 | Stop reason: SDUPTHER

## 2022-03-04 RX ORDER — LOSARTAN POTASSIUM 100 MG/1
50 TABLET ORAL DAILY
Qty: 15 TABLET | Refills: 0
Start: 2022-03-04

## 2022-03-04 SDOH — ECONOMIC STABILITY: FOOD INSECURITY: WITHIN THE PAST 12 MONTHS, YOU WORRIED THAT YOUR FOOD WOULD RUN OUT BEFORE YOU GOT MONEY TO BUY MORE.: NEVER TRUE

## 2022-03-04 SDOH — ECONOMIC STABILITY: FOOD INSECURITY: WITHIN THE PAST 12 MONTHS, THE FOOD YOU BOUGHT JUST DIDN'T LAST AND YOU DIDN'T HAVE MONEY TO GET MORE.: NEVER TRUE

## 2022-03-04 ASSESSMENT — PATIENT HEALTH QUESTIONNAIRE - PHQ9
1. LITTLE INTEREST OR PLEASURE IN DOING THINGS: 0
SUM OF ALL RESPONSES TO PHQ QUESTIONS 1-9: 0
1. LITTLE INTEREST OR PLEASURE IN DOING THINGS: 0
SUM OF ALL RESPONSES TO PHQ QUESTIONS 1-9: 0
SUM OF ALL RESPONSES TO PHQ9 QUESTIONS 1 & 2: 0
SUM OF ALL RESPONSES TO PHQ QUESTIONS 1-9: 0
SUM OF ALL RESPONSES TO PHQ9 QUESTIONS 1 & 2: 0
SUM OF ALL RESPONSES TO PHQ QUESTIONS 1-9: 0
2. FEELING DOWN, DEPRESSED OR HOPELESS: 0
2. FEELING DOWN, DEPRESSED OR HOPELESS: 0
SUM OF ALL RESPONSES TO PHQ QUESTIONS 1-9: 0
SUM OF ALL RESPONSES TO PHQ QUESTIONS 1-9: 0

## 2022-03-04 ASSESSMENT — LIFESTYLE VARIABLES: HOW OFTEN DO YOU HAVE A DRINK CONTAINING ALCOHOL: NEVER

## 2022-03-04 ASSESSMENT — ENCOUNTER SYMPTOMS
SHORTNESS OF BREATH: 0
CHEST TIGHTNESS: 0
COLOR CHANGE: 1
BACK PAIN: 0
EYES NEGATIVE: 1
GASTROINTESTINAL NEGATIVE: 1
WHEEZING: 0
COUGH: 0

## 2022-03-04 ASSESSMENT — SOCIAL DETERMINANTS OF HEALTH (SDOH): HOW HARD IS IT FOR YOU TO PAY FOR THE VERY BASICS LIKE FOOD, HOUSING, MEDICAL CARE, AND HEATING?: NOT HARD AT ALL

## 2022-03-04 NOTE — PATIENT INSTRUCTIONS
Personalized Preventive Plan for [facility] - 3/4/2022  Medicare offers a range of preventive health benefits. Some of the tests and screenings are paid in full while other may be subject to a deductible, co-insurance, and/or copay. Some of these benefits include a comprehensive review of your medical history including lifestyle, illnesses that may run in your family, and various assessments and screenings as appropriate. After reviewing your medical record and screening and assessments performed today your provider may have ordered immunizations, labs, imaging, and/or referrals for you. A list of these orders (if applicable) as well as your Preventive Care list are included within your After Visit Summary for your review. Other Preventive Recommendations:    · A preventive eye exam performed by an eye specialist is recommended every 1-2 years to screen for glaucoma; cataracts, macular degeneration, and other eye disorders. · A preventive dental visit is recommended every 6 months. · Try to get at least 150 minutes of exercise per week or 10,000 steps per day on a pedometer . · Order or download the FREE \"Exercise & Physical Activity: Your Everyday Guide\" from [employer]. Call 2-191.875.4303 or search [employer] online. · You need 8340-3233 mg of calcium and 1772-4064 IU of vitamin D per day. It is possible to meet your calcium requirement with diet alone, but a vitamin D supplement is usually necessary to meet this goal.  · When exposed to the sun, use a sunscreen that protects against both UVA and UVB radiation with an SPF of 30 or greater. Reapply every 2 to 3 hours or after sweating, drying off with a towel, or swimming. · Always wear a seat belt when traveling in a car. Always wear a helmet when riding a bicycle or motorcycle.

## 2022-03-04 NOTE — PROGRESS NOTES
Medicare Annual Wellness Visit    Patira Carias is here for Medicare AWV    Arun Barnes was seen today for medicare awv. Diagnoses and all orders for this visit:    Initial Medicare annual wellness visit         Recommendations for Preventive Services Due: see orders and patient instructions/AVS.  Recommended screening schedule for the next 5-10 years is provided to the patient in written form: see Patient Instructions/AVS.     Return for Medicare Annual Wellness Visit in 1 year. Subjective       Patient's complete Health Risk Assessment and screening values have been reviewed and are found in Flowsheets. The following problems were reviewed today and where indicated follow up appointments were made and/or referrals ordered.     Positive Risk Factor Screenings with Interventions:    Fall Risk:  Do you feel unsteady or are you worried about falling? : (!) yes  2 or more falls in past year?: no  Fall with injury in past year?: no     Fall Risk Interventions:    · Patient moving into [place] today             Health Habits/Nutrition:     Physical Activity: Inactive    Days of Exercise per Week: 0 days    Minutes of Exercise per Session: 0 min     Have you lost any weight without trying in the past 3 months?: No     Have you seen the dentist within the past year?: Yes    Health Habits/Nutrition Interventions:  · Inadequate physical activity:  patient is not ready to increase his/her physical activity level at this time      ADLs:  In the past 7 days, did you need help from others to perform any of the following everyday activities: Eating, dressing, grooming, bathing, toileting, or walking/balance?: (!) Yes  Select all that apply: (!) Bathing  In the past 7 days, did you need help from others to take care of any of the following: Laundry, housekeeping, banking/finances, shopping, telephone use, food preparation, transportation, or taking medications?: (!) Yes  Select all that apply: Alexx Toussaint Medications    ADL Interventions:  · patient moving into [place] today          Objective   There were no vitals filed for this visit. There is no height or weight on file to calculate BMI. Allergies   Allergen Reactions    Clindamycin Hives    Sulfa Antibiotics     Other Rash     Prior to Visit Medications    Medication Sig Taking?  Authorizing Provider   amiodarone (CORDARONE) 200 MG tablet Take 0.5 tablets by mouth daily  Shila Hollow, APRN - CNP   losartan (COZAAR) 100 MG tablet Take 0.5 tablets by mouth daily  Shila Hollow, APRN - CNP   Apoaequorin (PREVAGEN) 10 MG CAPS Take 10 mg by mouth daily  Shila Hollow, APRN - CNP   tamsulosin (FLOMAX) 0.4 MG capsule TAKE 2 CAPSULES BY MOUTH DAILY  Shila Hollow, APRN - CNP   COMBIGAN 0.2-0.5 % ophthalmic solution INT 1 GTT INTO OU BID  Historical Provider, MD   latanoprost (XALATAN) 0.005 % ophthalmic solution INT 1 GTT IN OU QHS UTD  Historical Provider, MD   levothyroxine (SYNTHROID) 25 MCG tablet Take 1 tablet by mouth Daily  Lorraine Wren MD   apixaban (ELIQUIS) 5 MG TABS tablet Take 1 tablet by mouth 2 times daily  Ran Jacques MD       [facility] (Including outside providers/suppliers regularly involved in providing care):   Patient Care Team:  Lorraine Wren MD as PCP - General (Family Medicine)  Lorraine Wren MD as PCP - [facility] Empaneled Provider    Reviewed and updated this visit:

## 2022-03-04 NOTE — PROGRESS NOTES
Subjective:     Patient ID: Yuly Ramírez is a 80 y.o. 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 HPI   Patient for chronic disease management  Needs paperwork filled out for [place]  Moving there with wife today    Past Medical History:   Diagnosis Date    Atrial [address]     BPH (benign prostatic hypertrophy)     Fistula     Glaucoma     Hypertension      Current Outpatient Medications on File Prior to Visit   Medication Sig Dispense Refill    tamsulosin (FLOMAX) 0.4 MG capsule TAKE 2 CAPSULES BY MOUTH DAILY 180 capsule 3    COMBIGAN 0.2-0.5 % ophthalmic solution INT 1 GTT INTO OU BID  12    latanoprost (XALATAN) 0.005 % ophthalmic solution INT 1 GTT IN OU QHS UTD  2    levothyroxine (SYNTHROID) 25 MCG tablet Take 1 tablet by mouth Daily 30 tablet 5    apixaban (ELIQUIS) 5 MG TABS tablet Take 1 tablet by mouth 2 times daily 60 tablet 1     No current facility-administered medications on file prior to visit. Past Surgical History:   Procedure Laterality Date    AV FISTULA REPAIR      CHOLECYSTECTOMY      [place] EGD TRANSORAL BIOPSY SINGLE/MULTIPLE N/A 6/28/2018    EGD performed by Jessi [facility]uez DO at [facility]      No family history on file.   Social History     Socioeconomic History    Marital status:      Spouse name: Not on file    Number of children: Not on file    Years of education: Not on file    Highest education level: Not on file   Occupational History    Not on file   Tobacco Use    Smoking status: Never Smoker    Smokeless tobacco: Never Used   Substance and Sexual Activity    Alcohol use: Yes     Comment: social    Drug use: No    Sexual activity: Not on file   Other Topics Concern    Not on file   Social History Narrative    Not on file     Social Determinants of Health     Financial Resource Strain: Low Risk     Difficulty of Paying Living Expenses: Not hard at all   Food Insecurity: No Food Insecurity    Worried About [facility] in the Last Year: Never true    Janak of Food in the Last Year: Never true   Transportation Needs:     Lack of Transportation (Medical): Not on file    Lack of Transportation (Non-Medical): Not on file   Physical Activity:     Days of Exercise per Week: Not on file    Minutes of Exercise per Session: Not on file   Stress:     Feeling of Stress : Not on file   Social Connections:     Frequency of Communication with Friends and Family: Not on file    Frequency of Social Gatherings with Friends and Family: Not on file    Attends Pentecostalism Services: Not on file    Active Member of [address] [place] [address] or Organizations: Not on file    Attends Club or Organization Meetings: Not on file    Marital Status: Not on file   Intimate Partner Violence:     Fear of Current or Ex-Partner: Not on file    Emotionally Abused: Not on file    Physically Abused: Not on file    Sexually Abused: Not on file   Housing Stability:     Unable to Pay for Housing in the Last Year: Not on file    Number of [place] in the Last Year: Not on file    Unstable Housing in the Last Year: Not on file     Allergies:  Clindamycin, Sulfa antibiotics, and Other    Review of Systems   Constitutional: Negative. Negative for chills and diaphoresis. HENT: Negative. Eyes: Negative. Respiratory: Negative for cough, chest tightness, shortness of breath and wheezing. Cardiovascular: Positive for leg swelling (chronic). Negative for chest pain and palpitations. Gastrointestinal: Negative. Genitourinary: Negative. Musculoskeletal: Positive for gait problem (ambulated with cane). Negative for arthralgias, back pain and myalgias. Skin: Positive for color change (chronic BLE). Negative for rash and wound. Neurological: Negative for dizziness, syncope, weakness, light-headedness and headaches. Psychiatric/Behavioral: Negative.         Objective: /72 (Site: Left Upper Arm, Position: Sitting, Cuff Size: Medium Adult)   Pulse 78   Temp 97.2 °F (36.2 °C) (Temporal)   Ht 5' 6\" (1.676 m)   Wt 171 lb 12.8 oz (77.9 kg)   SpO2 98%   BMI 27.73 kg/m²     Physical Exam  Constitutional:       General: He is not in acute distress. Appearance: Normal appearance. He is not toxic-appearing. HENT:      Head: Normocephalic and atraumatic. Right Ear: There is impacted cerumen. Left Ear: There is impacted cerumen. Nose: Nose normal.      Mouth/Throat:      Mouth: Mucous membranes are moist.      Pharynx: Oropharynx is clear. No oropharyngeal exudate or posterior oropharyngeal erythema. Eyes:      Extraocular Movements: Extraocular movements intact. Conjunctiva/sclera: Conjunctivae normal.      Pupils: Pupils are equal, round, and reactive to light. Cardiovascular:      Rate and Rhythm: Normal rate and regular rhythm. Heart sounds: Normal heart sounds. Pulmonary:      Effort: Pulmonary effort is normal. No respiratory distress. Breath sounds: Normal breath sounds. No stridor. No wheezing, rhonchi or rales. Chest:      Chest wall: No tenderness. Abdominal:      General: Bowel sounds are normal. There is no distension. Palpations: Abdomen is soft. Tenderness: There is no abdominal tenderness. There is no guarding or rebound. Musculoskeletal:         General: No swelling or tenderness. Normal range of motion. Cervical back: Normal range of motion and neck supple. No tenderness. Right lower leg: Edema present. Left lower leg: Edema present. Comments: 1+ edema to BLE with discoloration   Lymphadenopathy:      Cervical: No cervical adenopathy. Skin:     General: Skin is warm and dry. Coloration: Skin is not pale. Findings: No erythema. Neurological:      General: No focal deficit present. Mental Status: He is alert and oriented to person, place, and time.       Cranial Nerves: evaluate treatment resultsand for coordination of care. I have reviewed the patient's medical historyin detail and updated the computerized patient record.     MATT Rose - CNP

## 2022-03-10 ENCOUNTER — TELEPHONE (OUTPATIENT)
Dept: FAMILY MEDICINE CLINIC | Age: 87
End: 2022-03-10

## 2022-03-10 NOTE — TELEPHONE ENCOUNTER
[address] [place] [address] called inquiring about forms that were faxed that need to be filled out on behalf of the patient. She is re-faxing them today.

## 2022-03-10 NOTE — TELEPHONE ENCOUNTER
I do not need them refaxed I have received all of them and they are on his desk just waiting for him to sign them.

## 2022-03-11 ENCOUNTER — TELEPHONE (OUTPATIENT)
Dept: FAMILY MEDICINE CLINIC | Age: 87
End: 2022-03-11

## 2022-03-11 RX ORDER — TAMSULOSIN HYDROCHLORIDE 0.4 MG/1
0.8 CAPSULE ORAL DAILY
Qty: 180 CAPSULE | Refills: 3 | Status: SHIPPED | OUTPATIENT
Start: 2022-03-11 | End: 2022-07-11

## 2022-03-11 NOTE — TELEPHONE ENCOUNTER
[facility] is requesting the most recent prescription for the following medication.     Tamsulosin [facility])    Patient is a new resident at [facility] in [place], [place] [phone]

## 2022-03-29 RX ORDER — NYSTATIN 100000 [USP'U]/G
POWDER TOPICAL
Qty: 1 EACH | Refills: 5 | Status: SHIPPED | OUTPATIENT
Start: 2022-03-29

## 2022-03-30 ENCOUNTER — TELEPHONE (OUTPATIENT)
Dept: FAMILY MEDICINE CLINIC | Age: 87
End: 2022-03-30

## 2022-03-30 NOTE — TELEPHONE ENCOUNTER
Ute from [facility] called to report that patient has been drinking liquor heavily and has been non-compliant with his medications. Also stated patient is having recurring nosebleeds. Thank you.

## 2022-03-31 NOTE — TELEPHONE ENCOUNTER
Spoke with Ute from [facility] she wants you to know Suzi Chau is drinking 1.75 liter bottle of scotch in a 4 day period. She would also like to know how many sprays of the saline nasal spray to do.

## 2022-04-12 ENCOUNTER — TELEPHONE (OUTPATIENT)
Dept: FAMILY MEDICINE CLINIC | Age: 87
End: 2022-04-12

## 2022-07-11 RX ORDER — TAMSULOSIN HYDROCHLORIDE 0.4 MG/1
0.8 CAPSULE ORAL DAILY
Qty: 180 CAPSULE | Refills: 3 | Status: SHIPPED | OUTPATIENT
Start: 2022-07-11 | End: 2023-07-11

## 2023-04-12 PROBLEM — E03.9 ADULT HYPOTHYROIDISM: Status: ACTIVE | Noted: 2023-04-12

## 2023-04-12 PROBLEM — R39.11 URINARY HESITANCY: Status: RESOLVED | Noted: 2023-04-12 | Resolved: 2023-04-12

## 2023-04-12 PROBLEM — R60.0 EDEMA OF BOTH LEGS: Status: RESOLVED | Noted: 2023-04-12 | Resolved: 2023-04-12

## 2023-04-12 PROBLEM — R06.02 SOB (SHORTNESS OF BREATH) ON EXERTION: Status: RESOLVED | Noted: 2023-04-12 | Resolved: 2023-04-12

## 2023-04-12 PROBLEM — N40.0 BPH (BENIGN PROSTATIC HYPERPLASIA): Status: ACTIVE | Noted: 2023-04-12

## 2023-04-12 PROBLEM — I10 BENIGN ESSENTIAL HTN: Status: ACTIVE | Noted: 2023-04-12

## 2023-04-12 PROBLEM — I51.89 ECHOCARDIOGRAM SHOWS LEFT VENTRICULAR DIASTOLIC DYSFUNCTION: Status: RESOLVED | Noted: 2023-04-12 | Resolved: 2023-04-12

## 2023-04-12 PROBLEM — I87.2 VENOUS INSUFFICIENCY: Status: RESOLVED | Noted: 2023-04-12 | Resolved: 2023-04-12

## 2023-04-12 PROBLEM — I48.91 A-FIB (MULTI): Status: ACTIVE | Noted: 2023-04-12

## 2023-04-12 PROBLEM — K44.9 HIATAL HERNIA: Status: ACTIVE | Noted: 2023-04-12

## 2023-04-12 PROBLEM — R53.83 FATIGUE: Status: RESOLVED | Noted: 2023-04-12 | Resolved: 2023-04-12

## 2023-04-12 PROBLEM — R93.89 ABNORMAL CXR: Status: RESOLVED | Noted: 2023-04-12 | Resolved: 2023-04-12

## 2023-04-12 PROBLEM — I50.30 DIASTOLIC HEART FAILURE (MULTI): Status: ACTIVE | Noted: 2023-04-12

## 2023-04-12 PROBLEM — M19.90 ARTHRITIS: Status: ACTIVE | Noted: 2023-04-12

## 2023-04-12 PROBLEM — I51.7 CARDIOMEGALY: Status: ACTIVE | Noted: 2023-04-12

## 2023-04-12 PROBLEM — N18.30 CRF (CHRONIC RENAL FAILURE), STAGE 3 (MODERATE) (MULTI): Status: ACTIVE | Noted: 2023-04-12

## 2023-04-12 PROBLEM — F32.A MODERATE DEPRESSIVE DISORDER: Status: ACTIVE | Noted: 2023-04-12

## 2023-04-12 PROBLEM — K59.09 CHRONIC CONSTIPATION: Status: ACTIVE | Noted: 2023-04-12

## 2023-04-12 PROBLEM — D50.9 IRON DEFICIENCY ANEMIA: Status: ACTIVE | Noted: 2023-04-12

## 2023-04-12 PROBLEM — R79.89 ABNORMAL TSH: Status: RESOLVED | Noted: 2023-04-12 | Resolved: 2023-04-12

## 2023-04-12 RX ORDER — PAROXETINE 10 MG/1
1 TABLET, FILM COATED ORAL DAILY
COMMUNITY
Start: 2022-08-24

## 2023-04-12 RX ORDER — AMIODARONE HYDROCHLORIDE 100 MG/1
1 TABLET ORAL DAILY
COMMUNITY

## 2023-04-12 RX ORDER — TAMSULOSIN HYDROCHLORIDE 0.4 MG/1
2 CAPSULE ORAL DAILY
COMMUNITY
Start: 2021-02-17

## 2023-04-12 RX ORDER — LEVOTHYROXINE SODIUM 25 UG/1
1 TABLET ORAL DAILY
COMMUNITY
Start: 2021-04-16 | End: 2023-06-26 | Stop reason: SDUPTHER

## 2023-04-12 RX ORDER — LATANOPROST 50 UG/ML
1 SOLUTION/ DROPS OPHTHALMIC NIGHTLY
COMMUNITY
Start: 2021-06-28

## 2023-04-12 RX ORDER — ASPIRIN 81 MG
1 TABLET, DELAYED RELEASE (ENTERIC COATED) ORAL 2 TIMES DAILY
COMMUNITY
Start: 2022-05-17

## 2023-04-12 RX ORDER — ACETAMINOPHEN 500 MG
TABLET ORAL
COMMUNITY

## 2023-04-12 RX ORDER — BRIMONIDINE TARTRATE AND TIMOLOL MALEATE 2; 5 MG/ML; MG/ML
1 SOLUTION OPHTHALMIC EVERY 12 HOURS SCHEDULED
COMMUNITY
Start: 2021-06-04

## 2023-04-12 RX ORDER — LOSARTAN POTASSIUM 100 MG/1
1 TABLET ORAL DAILY
COMMUNITY

## 2023-04-12 RX ORDER — ALBUTEROL SULFATE 90 UG/1
2 AEROSOL, METERED RESPIRATORY (INHALATION) EVERY 4 HOURS PRN
COMMUNITY
Start: 2022-08-24

## 2023-04-12 RX ORDER — FUROSEMIDE 20 MG/1
1 TABLET ORAL DAILY
COMMUNITY
Start: 2022-05-17 | End: 2023-07-27 | Stop reason: SDUPTHER

## 2023-04-12 RX ORDER — NYSTATIN 100000 [USP'U]/G
POWDER TOPICAL
COMMUNITY
Start: 2022-06-14

## 2023-04-19 ENCOUNTER — OFFICE VISIT (OUTPATIENT)
Dept: PRIMARY CARE | Facility: CLINIC | Age: 88
End: 2023-04-19
Payer: MEDICARE

## 2023-04-19 ENCOUNTER — APPOINTMENT (OUTPATIENT)
Dept: PRIMARY CARE | Facility: CLINIC | Age: 88
End: 2023-04-19
Payer: MEDICARE

## 2023-04-19 VITALS
HEART RATE: 62 BPM | HEIGHT: 66 IN | DIASTOLIC BLOOD PRESSURE: 64 MMHG | WEIGHT: 168 LBS | SYSTOLIC BLOOD PRESSURE: 118 MMHG | BODY MASS INDEX: 27 KG/M2

## 2023-04-19 DIAGNOSIS — J42 CHRONIC BRONCHITIS, UNSPECIFIED CHRONIC BRONCHITIS TYPE (MULTI): ICD-10-CM

## 2023-04-19 DIAGNOSIS — I10 BENIGN ESSENTIAL HTN: Primary | ICD-10-CM

## 2023-04-19 DIAGNOSIS — I50.32 CHRONIC DIASTOLIC HEART FAILURE (MULTI): ICD-10-CM

## 2023-04-19 DIAGNOSIS — H35.031 BLIND HYPERTENSIVE RIGHT EYE: ICD-10-CM

## 2023-04-19 DIAGNOSIS — R73.01 IMPAIRED FASTING GLUCOSE: ICD-10-CM

## 2023-04-19 DIAGNOSIS — K59.09 CHRONIC CONSTIPATION: ICD-10-CM

## 2023-04-19 DIAGNOSIS — N18.30 CRF (CHRONIC RENAL FAILURE), STAGE 3 (MODERATE) (MULTI): ICD-10-CM

## 2023-04-19 DIAGNOSIS — R79.89 ABNORMAL TSH: ICD-10-CM

## 2023-04-19 DIAGNOSIS — D64.9 ANEMIA, UNSPECIFIED TYPE: ICD-10-CM

## 2023-04-19 DIAGNOSIS — I48.0 PAROXYSMAL ATRIAL FIBRILLATION (MULTI): ICD-10-CM

## 2023-04-19 DIAGNOSIS — F33.9 DEPRESSION, RECURRENT (CMS-HCC): ICD-10-CM

## 2023-04-19 DIAGNOSIS — M19.90 ARTHRITIS: ICD-10-CM

## 2023-04-19 DIAGNOSIS — Z13.220 SCREENING FOR HYPERLIPIDEMIA: ICD-10-CM

## 2023-04-19 PROCEDURE — 99214 OFFICE O/P EST MOD 30 MIN: CPT | Performed by: INTERNAL MEDICINE

## 2023-04-19 PROCEDURE — 3074F SYST BP LT 130 MM HG: CPT | Performed by: INTERNAL MEDICINE

## 2023-04-19 PROCEDURE — 1159F MED LIST DOCD IN RCRD: CPT | Performed by: INTERNAL MEDICINE

## 2023-04-19 PROCEDURE — 1160F RVW MEDS BY RX/DR IN RCRD: CPT | Performed by: INTERNAL MEDICINE

## 2023-04-19 PROCEDURE — 3078F DIAST BP <80 MM HG: CPT | Performed by: INTERNAL MEDICINE

## 2023-04-19 RX ORDER — LOSARTAN POTASSIUM 50 MG/1
100 TABLET ORAL DAILY
COMMUNITY
Start: 2022-08-26 | End: 2023-06-26 | Stop reason: SDUPTHER

## 2023-04-19 RX ORDER — FINASTERIDE 5 MG/1
5 TABLET, FILM COATED ORAL DAILY
COMMUNITY
Start: 2023-04-08

## 2023-04-19 ASSESSMENT — ENCOUNTER SYMPTOMS
NAUSEA: 0
PALPITATIONS: 0
WEAKNESS: 0
CONSTIPATION: 1
SHORTNESS OF BREATH: 0
WHEEZING: 0
CARDIOVASCULAR NEGATIVE: 1
HEADACHES: 0
BACK PAIN: 0
ABDOMINAL PAIN: 0
CHILLS: 0
DIZZINESS: 0
MUSCULOSKELETAL NEGATIVE: 1
ABDOMINAL DISTENTION: 0
VOMITING: 0
FEVER: 0
DYSURIA: 0
PSYCHIATRIC NEGATIVE: 1
RHINORRHEA: 0
LIGHT-HEADEDNESS: 0
NEUROLOGICAL NEGATIVE: 1
COUGH: 0
EYES NEGATIVE: 1
DIARRHEA: 0
ENDOCRINE NEGATIVE: 1
AGITATION: 0

## 2023-04-19 NOTE — PROGRESS NOTES
Subjective   Patient ID: Fortino Bahena is a 89 y.o. male who presents for Follow-up (NEEDS H+ P).  HPI  LAB F/U (4 MON AGO). PT IS [facility] RESIDENT , IS HERE WITH SON .YEARLY PHYSICAL FORM OF [facility] TO BE DONE. HAS CHRONIC CONSTIPATION, WHICH IS IMPROVING WITH COLACE AND PRN METAMUCIL.  HAD SUPERFICIAL  SKIN CUT OF R FOREARM 1 WEEK AGO WHICH IS HEALED.  Review of Systems   Constitutional:  Negative for chills and fever.   HENT: Negative.  Negative for congestion, postnasal drip and rhinorrhea.    Eyes: Negative.  Negative for visual disturbance.   Respiratory:  Negative for cough, shortness of breath and wheezing.    Cardiovascular: Negative.  Negative for chest pain, palpitations and leg swelling.   Gastrointestinal:  Positive for constipation. Negative for abdominal distention, abdominal pain, diarrhea, nausea and vomiting.   Endocrine: Negative.    Genitourinary:  Negative for dysuria and urgency.   Musculoskeletal: Negative.  Negative for back pain.   Skin: Negative.  Negative for rash.   Allergic/Immunologic: Negative for immunocompromised state.   Neurological: Negative.  Negative for dizziness, weakness, light-headedness and headaches.   Psychiatric/Behavioral: Negative.  Negative for agitation.        Objective   Physical Exam  Constitutional:       General: He is not in acute distress.  HENT:      Head: Normocephalic.      Nose: Nose normal.      Mouth/Throat:      Mouth: Mucous membranes are moist.   Eyes:      Conjunctiva/sclera: Conjunctivae normal.      Pupils: Pupils are equal, round, and reactive to light.   Cardiovascular:      Rate and Rhythm: Normal rate and regular rhythm.      Pulses: Normal pulses.      Heart sounds: Normal heart sounds.   Pulmonary:      Effort: No respiratory distress.      Breath sounds: No wheezing.   Chest:      Chest wall: No tenderness.   Abdominal:      General: Abdomen is flat. Bowel sounds are normal.      Palpations: Abdomen is soft.      Tenderness:  There is abdominal tenderness.      Comments: RLQ TENDERNESS   Musculoskeletal:         General: No tenderness. Normal range of motion.      Cervical back: Normal range of motion.   Lymphadenopathy:      Cervical: No cervical adenopathy.   Skin:     General: Skin is warm and dry.      Findings: No rash.   Neurological:      General: No focal deficit present.      Mental Status: He is alert. Mental status is at baseline.   Psychiatric:         Mood and Affect: Mood normal.         Behavior: Behavior normal.         Assessment/Plan   1. Benign essential HTN  Comprehensive Metabolic Panel      2. Arthritis        3. Anemia, unspecified type  CBC and Auto Differential    Vitamin B12    Methylmalonic Acid    Folate    Iron and TIBC    Ferritin      4. Impaired fasting glucose  Hemoglobin A1C    Comprehensive Metabolic Panel      5. Screening for hyperlipidemia  Lipid Panel      6. Abnormal TSH  TSH with reflex to Free T4 if abnormal      7. Chronic constipation  TSH with reflex to Free T4 if abnormal      8. Chronic bronchitis, unspecified chronic bronchitis type (CMS/HCC)        9. Depression, recurrent (CMS/HCC)        10. Chronic diastolic heart failure (CMS/HCC)        11. Paroxysmal atrial fibrillation (CMS/HCC)        12. 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   TEST RESULTS WERE DISCUSSED.  ALL ASSESSED CHRONIC CONDITIONS ARE STABLE OR ADDRESSED.  ADVISED TO HAVE LOW FAT AND LOW CALORIE DIET AND TO LOOSE WEIGHT, DAILY EXERCISE.  ADVISED TO ADD MORE FIBER TO DIET.  Advised TO DO ABDOMINAL U/S , PT AND SON REFUSED. ADVISED TO GO TO ER IF PAIN GETS WORSE.  ADVISED FOR FALL PRECAUTION.    MDM    1) COMPLEXITY: 1 UNDIAGNOSED NEW PROBLEM WITH UNCERTAIN PROGNOSIS  2)DATA: TESTS INTERPRETED AND OR ORDERED, TOOK INDEPENDENT HISTORY OR RECORDS REVIEWED  3)RISK: MODERATE RISK DUE TO NATURE OF MEDICAL CONDITIONS/COMORBIDITY OR  MEDICATIONS ORDERED OR SURGICAL OR PROCEDURE REFERRAL, .      6 WEEKS

## 2023-06-06 ENCOUNTER — APPOINTMENT (OUTPATIENT)
Dept: PRIMARY CARE | Facility: CLINIC | Age: 88
End: 2023-06-06
Payer: MEDICARE

## 2023-06-12 ENCOUNTER — TELEPHONE (OUTPATIENT)
Dept: PRIMARY CARE | Facility: CLINIC | Age: 88
End: 2023-06-12
Payer: MEDICARE

## 2023-06-19 NOTE — TELEPHONE ENCOUNTER
I have called and patient's son will not  phone, at first he stated that he does not find it necessary to make an appt for his father the patient. Patient's son is responsible for all transport and who we talk to.

## 2023-06-22 NOTE — TELEPHONE ENCOUNTER
Spoke with jose daniel at [facility] and she is going to try contacting son/POA bliar for us. I will try and contact her tomorrow.

## 2023-06-22 NOTE — TELEPHONE ENCOUNTER
Ramila with [facility] was able to get a hold of son blair and he was ok with a virtual appt. Scheduled for 6/26/23 at 930am

## 2023-06-26 ENCOUNTER — TELEMEDICINE (OUTPATIENT)
Dept: PRIMARY CARE | Facility: CLINIC | Age: 88
End: 2023-06-26
Payer: MEDICARE

## 2023-06-26 DIAGNOSIS — R53.82 CHRONIC FATIGUE: ICD-10-CM

## 2023-06-26 DIAGNOSIS — E87.1 CHRONIC HYPONATREMIA: ICD-10-CM

## 2023-06-26 DIAGNOSIS — R79.89 ABNORMAL TSH: ICD-10-CM

## 2023-06-26 DIAGNOSIS — N18.30 CRF (CHRONIC RENAL FAILURE), STAGE 3 (MODERATE) (MULTI): ICD-10-CM

## 2023-06-26 DIAGNOSIS — E03.9 ADULT HYPOTHYROIDISM: Primary | ICD-10-CM

## 2023-06-26 PROCEDURE — 99443 PR PHYS/QHP TELEPHONE EVALUATION 21-30 MIN: CPT | Performed by: INTERNAL MEDICINE

## 2023-06-26 RX ORDER — LEVOTHYROXINE SODIUM 50 UG/1
50 TABLET ORAL DAILY
Qty: 90 TABLET | Refills: 3 | Status: SHIPPED | OUTPATIENT
Start: 2023-06-26 | End: 2023-07-27 | Stop reason: SDUPTHER

## 2023-06-26 ASSESSMENT — ENCOUNTER SYMPTOMS
WHEEZING: 0
FATIGUE: 1
NAUSEA: 0
ABDOMINAL DISTENTION: 0
PALPITATIONS: 0
CARDIOVASCULAR NEGATIVE: 1
SHORTNESS OF BREATH: 0
HEADACHES: 0
CHILLS: 0
WEAKNESS: 0
PSYCHIATRIC NEGATIVE: 1
AGITATION: 0
DIZZINESS: 0
BACK PAIN: 0
MUSCULOSKELETAL NEGATIVE: 1
ABDOMINAL PAIN: 0
NEUROLOGICAL NEGATIVE: 1
LIGHT-HEADEDNESS: 0
DYSURIA: 0
COUGH: 0
RHINORRHEA: 0
DIARRHEA: 0
ENDOCRINE NEGATIVE: 1
CONSTIPATION: 0
VOMITING: 0
FEVER: 0
EYES NEGATIVE: 1
GASTROINTESTINAL NEGATIVE: 1

## 2023-06-26 ASSESSMENT — PATIENT HEALTH QUESTIONNAIRE - PHQ9
SUM OF ALL RESPONSES TO PHQ9 QUESTIONS 1 AND 2: 0
1. LITTLE INTEREST OR PLEASURE IN DOING THINGS: NOT AT ALL
2. FEELING DOWN, DEPRESSED OR HOPELESS: NOT AT ALL

## 2023-06-26 NOTE — PROGRESS NOTES
Subjective   Patient ID: Fortino Bahena is a 89 y.o. male who presents for Follow-up (Lab results).  HPI  TELEPHONE VISIT  LAB F/U . COMPLAINS OF FATIGUE.  Review of Systems   Constitutional:  Positive for fatigue. Negative for chills and fever.   HENT: Negative.  Negative for congestion, postnasal drip and rhinorrhea.    Eyes: Negative.  Negative for visual disturbance.   Respiratory:  Negative for cough, shortness of breath and wheezing.    Cardiovascular: Negative.  Negative for chest pain, palpitations and leg swelling.   Gastrointestinal: Negative.  Negative for abdominal distention, abdominal pain, constipation, diarrhea, nausea and vomiting.   Endocrine: Negative.    Genitourinary:  Negative for dysuria and urgency.   Musculoskeletal: Negative.  Negative for back pain.   Skin: Negative.  Negative for rash.   Allergic/Immunologic: Negative for immunocompromised state.   Neurological: Negative.  Negative for dizziness, weakness, light-headedness and headaches.   Psychiatric/Behavioral: Negative.  Negative for agitation.        Objective   Physical Exam    Assessment/Plan   1. Adult hypothyroidism  levothyroxine (Synthroid, Levoxyl) 50 mcg tablet      2. Chronic fatigue        3. Abnormal TSH        4. CRF (chronic renal failure), stage 3 (moderate) (CMS/HCC)        5. Chronic hyponatremia        ADVISED TO ADD MORE SALT TO DIET WITH HAVING HYPONATREMIA AND TO MONITOR THE BP.  TEST RESULTS WERE DISCUSSED.  ADVISED TO HAVE LOW FAT AND LOW CALORIE DIET AND TO LOOSE WEIGHT, DAILY EXERCISE.  ADVISED FOR FALL PRECAUTION.  OFFERED TO REPEAT THE TSH IN 2 MONTHS , FAMILY WANTS TO WAIT 6 MON FOR INSURANCE COVERAGE.     SPENT 30 MINUTES WITH PT ADDRESSING THE ABOVE.    2 mon IN PERSON  FAX THE NOTE TO [facility] THAT THE DOSAGE OF LEVOTHROXINE IS INCREASED TO 50 MCG DAILY.  CONFIRM THE NAME OF PHARMACY (THE ORDER [facility])

## 2023-07-26 PROBLEM — R60.9 EDEMA, UNSPECIFIED TYPE: Status: ACTIVE | Noted: 2023-07-26

## 2023-07-26 PROBLEM — R60.9 EDEMA: Status: ACTIVE | Noted: 2023-07-26

## 2023-07-26 RX ORDER — POLYETHYLENE GLYCOL 3350 17 G/17G
17 POWDER, FOR SOLUTION ORAL
COMMUNITY

## 2023-07-26 RX ORDER — SENNOSIDES 25 MG/1
TABLET, FILM COATED ORAL EVERY 8 HOURS PRN
COMMUNITY

## 2023-07-27 ENCOUNTER — OFFICE VISIT (OUTPATIENT)
Dept: PRIMARY CARE | Facility: CLINIC | Age: 88
End: 2023-07-27
Payer: MEDICARE

## 2023-07-27 VITALS
HEART RATE: 78 BPM | OXYGEN SATURATION: 95 % | DIASTOLIC BLOOD PRESSURE: 68 MMHG | BODY MASS INDEX: 26.36 KG/M2 | HEIGHT: 66 IN | SYSTOLIC BLOOD PRESSURE: 118 MMHG | WEIGHT: 164 LBS

## 2023-07-27 DIAGNOSIS — R60.0 EDEMA LEG: ICD-10-CM

## 2023-07-27 DIAGNOSIS — R53.82 CHRONIC FATIGUE: ICD-10-CM

## 2023-07-27 DIAGNOSIS — I10 BENIGN ESSENTIAL HTN: ICD-10-CM

## 2023-07-27 DIAGNOSIS — D64.9 ANEMIA, UNSPECIFIED TYPE: ICD-10-CM

## 2023-07-27 DIAGNOSIS — E78.00 HYPERCHOLESTEROLEMIA: ICD-10-CM

## 2023-07-27 DIAGNOSIS — I70.90 ARTERIAL ATHEROSCLEROSIS: Primary | ICD-10-CM

## 2023-07-27 DIAGNOSIS — E03.9 ADULT HYPOTHYROIDISM: ICD-10-CM

## 2023-07-27 PROBLEM — R60.9 EDEMA, UNSPECIFIED TYPE: Status: RESOLVED | Noted: 2023-07-26 | Resolved: 2023-07-27

## 2023-07-27 PROBLEM — R60.9 EDEMA: Status: RESOLVED | Noted: 2023-07-26 | Resolved: 2023-07-27

## 2023-07-27 PROCEDURE — 3074F SYST BP LT 130 MM HG: CPT | Performed by: INTERNAL MEDICINE

## 2023-07-27 PROCEDURE — 3078F DIAST BP <80 MM HG: CPT | Performed by: INTERNAL MEDICINE

## 2023-07-27 PROCEDURE — 1160F RVW MEDS BY RX/DR IN RCRD: CPT | Performed by: INTERNAL MEDICINE

## 2023-07-27 PROCEDURE — 99214 OFFICE O/P EST MOD 30 MIN: CPT | Performed by: INTERNAL MEDICINE

## 2023-07-27 PROCEDURE — 1159F MED LIST DOCD IN RCRD: CPT | Performed by: INTERNAL MEDICINE

## 2023-07-27 RX ORDER — ATORVASTATIN CALCIUM 10 MG/1
10 TABLET, FILM COATED ORAL DAILY
Qty: 90 TABLET | Refills: 3 | Status: SHIPPED | OUTPATIENT
Start: 2023-07-27 | End: 2023-07-28 | Stop reason: SDUPTHER

## 2023-07-27 RX ORDER — LEVOTHYROXINE SODIUM 50 UG/1
50 TABLET ORAL DAILY
Qty: 90 TABLET | Refills: 3 | Status: SHIPPED | OUTPATIENT
Start: 2023-07-27 | End: 2023-07-28 | Stop reason: SDUPTHER

## 2023-07-27 RX ORDER — FUROSEMIDE 20 MG/1
20 TABLET ORAL 2 TIMES DAILY
Qty: 180 TABLET | Refills: 3 | Status: SHIPPED | OUTPATIENT
Start: 2023-07-27 | End: 2023-07-28 | Stop reason: SDUPTHER

## 2023-07-27 ASSESSMENT — ENCOUNTER SYMPTOMS
ENDOCRINE NEGATIVE: 1
ABDOMINAL DISTENTION: 0
SHORTNESS OF BREATH: 0
GASTROINTESTINAL NEGATIVE: 1
WEAKNESS: 0
NAUSEA: 0
PALPITATIONS: 0
COUGH: 0
BACK PAIN: 0
MUSCULOSKELETAL NEGATIVE: 1
VOMITING: 0
WHEEZING: 0
DIZZINESS: 0
DYSURIA: 0
FATIGUE: 1
NEUROLOGICAL NEGATIVE: 1
CARDIOVASCULAR NEGATIVE: 1
ABDOMINAL PAIN: 0
DIARRHEA: 0
LIGHT-HEADEDNESS: 0
PSYCHIATRIC NEGATIVE: 1
CONSTIPATION: 0
FEVER: 0
CHILLS: 0
AGITATION: 0
RHINORRHEA: 0
EYES NEGATIVE: 1
HEADACHES: 0

## 2023-07-27 ASSESSMENT — PATIENT HEALTH QUESTIONNAIRE - PHQ9
SUM OF ALL RESPONSES TO PHQ9 QUESTIONS 1 AND 2: 2
1. LITTLE INTEREST OR PLEASURE IN DOING THINGS: SEVERAL DAYS
2. FEELING DOWN, DEPRESSED OR HOPELESS: SEVERAL DAYS
10. IF YOU CHECKED OFF ANY PROBLEMS, HOW DIFFICULT HAVE THESE PROBLEMS MADE IT FOR YOU TO DO YOUR WORK, TAKE CARE OF THINGS AT HOME, OR GET ALONG WITH OTHER PEOPLE: NOT DIFFICULT AT ALL

## 2023-07-27 NOTE — PROGRESS NOTES
Subjective   Patient ID: Fortino Bahena is a 89 y.o. male who presents for Follow-up (ED more swelling than normal).  HPI  F/U AFTER ER VISIT FOR WORSENING B/L LEG EDEMA WITH SOB. GOIT IV LASIX SYMPTOMS IMPROVED. HAD LABS AND CXR, UA DONE AT ER. LEG EDEMA IS STILL PRESENT BUT LESS SEVERE THAN ER VISIT. [facility] CHRONIC FATIGUE.  Review of Systems   Constitutional:  Positive for fatigue. Negative for chills and fever.   HENT: Negative.  Negative for congestion, postnasal drip and rhinorrhea.    Eyes: Negative.  Negative for visual disturbance.   Respiratory:  Negative for cough, shortness of breath and wheezing.    Cardiovascular: Negative.  Negative for chest pain, palpitations and leg swelling.   Gastrointestinal: Negative.  Negative for abdominal distention, abdominal pain, constipation, diarrhea, nausea and vomiting.   Endocrine: Negative.    Genitourinary:  Negative for dysuria and urgency.   Musculoskeletal: Negative.  Negative for back pain.        LEG EDEMA   Skin: Negative.  Negative for rash.   Allergic/Immunologic: Negative for immunocompromised state.   Neurological: Negative.  Negative for dizziness, weakness, light-headedness and headaches.   Psychiatric/Behavioral: Negative.  Negative for agitation.        Objective   Physical Exam  Constitutional:       General: He is not in acute distress.  HENT:      Head: Normocephalic.      Nose: Nose normal.      Mouth/Throat:      Mouth: Mucous membranes are moist.   Eyes:      Conjunctiva/sclera: Conjunctivae normal.      Pupils: Pupils are equal, round, and reactive to light.   Cardiovascular:      Rate and Rhythm: Normal rate and regular rhythm.      Pulses: Normal pulses.      Heart sounds: Normal heart sounds.   Pulmonary:      Effort: No respiratory distress.      Breath sounds: No wheezing.   Chest:      Chest wall: No tenderness.   Abdominal:      General: Abdomen is flat. Bowel sounds are normal.      Palpations: Abdomen is soft.      Tenderness: There is  no abdominal tenderness.   Musculoskeletal:         General: No tenderness. Normal range of motion.      Cervical back: Normal range of motion.   Lymphadenopathy:      Cervical: No cervical adenopathy.   Skin:     General: Skin is warm and dry.      Findings: No rash.   Neurological:      General: No focal deficit present.      Mental Status: He is alert. Mental status is at baseline.   Psychiatric:         Mood and Affect: Mood normal.         Behavior: Behavior normal.         Assessment/Plan   1. Arterial atherosclerosis  atorvastatin (Lipitor) 10 mg tablet      2. Hypercholesterolemia  atorvastatin (Lipitor) 10 mg tablet      3. Adult hypothyroidism  levothyroxine (Synthroid, Levoxyl) 50 mcg tablet      4. Edema leg  furosemide (Lasix) 20 mg tablet    Comprehensive Metabolic Panel      5. Benign essential HTN  Comprehensive Metabolic Panel      6. Chronic fatigue  CBC and Auto Differential    Vitamin B12    Methylmalonic Acid    Folate    Iron and TIBC    Ferritin      7. Anemia, unspecified type  CBC and Auto Differential    Vitamin B12    Methylmalonic Acid    Folate    Iron and TIBC    Ferritin        TEST RESULTS WERE DISCUSSED.  ADVISED TO HAVE LOW FAT AND LOW CALORIE DIET AND TO LOOSE WEIGHT, DAILY EXERCISE.  ADVISED FOR B/L LEG ELEVATION PRN.      MDM    1) COMPLEXITY: 1 UNDIAGNOSED NEW PROBLEM WITH UNCERTAIN PROGNOSIS  2)DATA: TESTS INTERPRETED AND OR ORDERED, TOOK INDEPENDENT HISTORY OR RECORDS REVIEWED  3)RISK: MODERATE RISK DUE TO NATURE OF MEDICAL CONDITIONS/COMORBIDITY OR MEDICATIONS ORDERED OR SURGICAL OR PROCEDURE REFERRAL, .       6 WEEKS .  LABS TO BE DONE AT [facility] (1 WEEK BEFORE VISIT).

## 2023-07-28 DIAGNOSIS — E78.00 HYPERCHOLESTEROLEMIA: ICD-10-CM

## 2023-07-28 DIAGNOSIS — R60.0 EDEMA LEG: ICD-10-CM

## 2023-07-28 DIAGNOSIS — E03.9 ADULT HYPOTHYROIDISM: ICD-10-CM

## 2023-07-28 DIAGNOSIS — I70.90 ARTERIAL ATHEROSCLEROSIS: ICD-10-CM

## 2023-08-01 RX ORDER — LEVOTHYROXINE SODIUM 50 UG/1
50 TABLET ORAL DAILY
Qty: 90 TABLET | Refills: 3 | Status: SHIPPED | OUTPATIENT
Start: 2023-08-01 | End: 2023-10-31 | Stop reason: CLARIF

## 2023-08-01 RX ORDER — ATORVASTATIN CALCIUM 10 MG/1
10 TABLET, FILM COATED ORAL DAILY
Qty: 90 TABLET | Refills: 3 | Status: SHIPPED | OUTPATIENT
Start: 2023-08-01 | End: 2023-10-31 | Stop reason: CLARIF

## 2023-08-01 RX ORDER — FUROSEMIDE 20 MG/1
20 TABLET ORAL 2 TIMES DAILY
Qty: 180 TABLET | Refills: 3 | Status: SHIPPED | OUTPATIENT
Start: 2023-08-01 | End: 2023-10-31 | Stop reason: CLARIF

## 2023-08-24 ENCOUNTER — TELEPHONE (OUTPATIENT)
Dept: PRIMARY CARE | Facility: CLINIC | Age: 88
End: 2023-08-24
Payer: MEDICARE

## 2023-08-24 DIAGNOSIS — M54.6 ACUTE BILATERAL THORACIC BACK PAIN: Primary | ICD-10-CM

## 2023-08-24 RX ORDER — LIDOCAINE 50 MG/G
1 PATCH TOPICAL DAILY PRN
Qty: 30 PATCH | Refills: 1 | Status: SHIPPED | OUTPATIENT
Start: 2023-08-24 | End: 2023-10-31 | Stop reason: CLARIF

## 2023-08-24 NOTE — TELEPHONE ENCOUNTER
I PLACED THE ORDER FOR LIDOCAIN PATCH FOR PAIN AND NEEDS OC FOR SKIN TEAR EVALUATIONS.  I HAD MULTIPLE FAXES FROM [facility] FOR THIS PT , S/P ER VISIT WITH RECURRENT FALL. PLEASE LET THE FAMILY KNOW TO CONSIDER [facility] PLACEMENT FOR HIM . HE'S NOT STABLE ENOUGH FOR ASSISTED LIVING .

## 2023-08-28 ENCOUNTER — APPOINTMENT (OUTPATIENT)
Dept: PRIMARY CARE | Facility: CLINIC | Age: 88
End: 2023-08-28
Payer: MEDICARE
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
## 2023-08-31 PROBLEM — M48.50XA COMPRESSION FRACTURE OF SPINE (MULTI): Status: RESOLVED | Noted: 2023-08-31 | Resolved: 2023-08-31

## 2023-09-07 ENCOUNTER — APPOINTMENT (OUTPATIENT)
Dept: PRIMARY CARE | Facility: CLINIC | Age: 88
End: 2023-09-07
Payer: MEDICARE

## (undated) DEVICE — LUBRICANT SURG JELLY ST BACTER TUBE 4.25OZ

## (undated) DEVICE — ENDO CARRY-ON PROCEDURE KIT: Brand: ENDO CARRY-ON PROCEDURE KIT

## (undated) DEVICE — TUBE SET 96 MM 64 MM H2O PERISTALTIC STD AUX CHANNEL

## (undated) DEVICE — GLOVE SURG SZ 85 STD WHT LTX SYN POLYMER BEAD REINF ANTI RL

## (undated) DEVICE — CONMED SCOPE SAVER BITE BLOCK, 20X27 MM: Brand: SCOPE SAVER

## (undated) DEVICE — SONY PRINTER PAPER

## (undated) DEVICE — ADAPTER FLSH PMP FLD MGMT GI IRRIG OFP 2 DISPOSABLE

## (undated) DEVICE — Device: Brand: ENDO SMARTCAP